# Patient Record
Sex: FEMALE | Race: BLACK OR AFRICAN AMERICAN | NOT HISPANIC OR LATINO | URBAN - METROPOLITAN AREA
[De-identification: names, ages, dates, MRNs, and addresses within clinical notes are randomized per-mention and may not be internally consistent; named-entity substitution may affect disease eponyms.]

---

## 2018-01-01 ENCOUNTER — OUTPATIENT (OUTPATIENT)
Dept: OUTPATIENT SERVICES | Facility: HOSPITAL | Age: 68
LOS: 1 days | Discharge: HOME | End: 2018-01-01

## 2018-01-01 ENCOUNTER — INPATIENT (INPATIENT)
Facility: HOSPITAL | Age: 68
LOS: 16 days | End: 2018-05-02
Attending: HOSPITALIST | Admitting: HOSPITALIST

## 2018-01-01 VITALS
DIASTOLIC BLOOD PRESSURE: 57 MMHG | SYSTOLIC BLOOD PRESSURE: 120 MMHG | TEMPERATURE: 99 F | RESPIRATION RATE: 20 BRPM | HEART RATE: 93 BPM | OXYGEN SATURATION: 89 %

## 2018-01-01 VITALS — HEART RATE: 105 BPM | OXYGEN SATURATION: 98 %

## 2018-01-01 DIAGNOSIS — N18.9 CHRONIC KIDNEY DISEASE, UNSPECIFIED: ICD-10-CM

## 2018-01-01 DIAGNOSIS — R79.9 ABNORMAL FINDING OF BLOOD CHEMISTRY, UNSPECIFIED: ICD-10-CM

## 2018-01-01 DIAGNOSIS — B96.89 OTHER SPECIFIED BACTERIAL AGENTS AS THE CAUSE OF DISEASES CLASSIFIED ELSEWHERE: ICD-10-CM

## 2018-01-01 DIAGNOSIS — R94.5 ABNORMAL RESULTS OF LIVER FUNCTION STUDIES: ICD-10-CM

## 2018-01-01 DIAGNOSIS — A09 INFECTIOUS GASTROENTERITIS AND COLITIS, UNSPECIFIED: ICD-10-CM

## 2018-01-01 DIAGNOSIS — J95.811 POSTPROCEDURAL PNEUMOTHORAX: ICD-10-CM
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
## 2018-01-01 DIAGNOSIS — I10 ESSENTIAL (PRIMARY) HYPERTENSION: ICD-10-CM

## 2018-01-01 DIAGNOSIS — J93.9 PNEUMOTHORAX, UNSPECIFIED: ICD-10-CM

## 2018-01-01 DIAGNOSIS — R53.83 OTHER FATIGUE: ICD-10-CM

## 2018-01-01 LAB
-  CANDIDA TROPICALIS: SIGNIFICANT CHANGE UP
ALBUMIN FLD-MCNC: 1.1 G/DL — SIGNIFICANT CHANGE UP
ALBUMIN FLD-MCNC: 1.4 G/DL — SIGNIFICANT CHANGE UP
ALBUMIN SERPL ELPH-MCNC: 1.8 G/DL — LOW (ref 3.5–5.2)
ALBUMIN SERPL ELPH-MCNC: 1.9 G/DL — LOW (ref 3.5–5.2)
ALBUMIN SERPL ELPH-MCNC: 2.2 G/DL — LOW (ref 3.5–5.2)
ALBUMIN SERPL ELPH-MCNC: 2.4 G/DL — LOW (ref 3.5–5.2)
ALP SERPL-CCNC: 177 U/L — HIGH (ref 30–115)
ALP SERPL-CCNC: 191 U/L — HIGH (ref 30–115)
ALP SERPL-CCNC: 199 U/L — HIGH (ref 30–115)
ALP SERPL-CCNC: 212 U/L — HIGH (ref 30–115)
ALT FLD-CCNC: 10 U/L — SIGNIFICANT CHANGE UP (ref 0–41)
ALT FLD-CCNC: 11 U/L — SIGNIFICANT CHANGE UP (ref 0–41)
ALT FLD-CCNC: 13 U/L — SIGNIFICANT CHANGE UP (ref 0–41)
ALT FLD-CCNC: 9 U/L — SIGNIFICANT CHANGE UP (ref 0–41)
AMYLASE FLD-CCNC: 33 U/L — SIGNIFICANT CHANGE UP
ANION GAP SERPL CALC-SCNC: 10 MMOL/L — SIGNIFICANT CHANGE UP (ref 7–14)
ANION GAP SERPL CALC-SCNC: 11 MMOL/L — SIGNIFICANT CHANGE UP (ref 7–14)
ANION GAP SERPL CALC-SCNC: 12 MMOL/L — SIGNIFICANT CHANGE UP (ref 7–14)
ANION GAP SERPL CALC-SCNC: 14 MMOL/L — SIGNIFICANT CHANGE UP (ref 7–14)
ANION GAP SERPL CALC-SCNC: 17 MMOL/L — HIGH (ref 7–14)
ANION GAP SERPL CALC-SCNC: 17 MMOL/L — HIGH (ref 7–14)
ANION GAP SERPL CALC-SCNC: 18 MMOL/L — HIGH (ref 7–14)
ANION GAP SERPL CALC-SCNC: 18 MMOL/L — HIGH (ref 7–14)
ANION GAP SERPL CALC-SCNC: 7 MMOL/L — SIGNIFICANT CHANGE UP (ref 7–14)
ANION GAP SERPL CALC-SCNC: 8 MMOL/L — SIGNIFICANT CHANGE UP (ref 7–14)
ANION GAP SERPL CALC-SCNC: 8 MMOL/L — SIGNIFICANT CHANGE UP (ref 7–14)
ANION GAP SERPL CALC-SCNC: 9 MMOL/L — SIGNIFICANT CHANGE UP (ref 7–14)
ANISOCYTOSIS BLD QL: SIGNIFICANT CHANGE UP
APPEARANCE UR: (no result)
APPEARANCE UR: CLEAR — SIGNIFICANT CHANGE UP
APTT BLD: 33.6 SEC — SIGNIFICANT CHANGE UP (ref 27–39.2)
AST SERPL-CCNC: 44 U/L — HIGH (ref 0–41)
AST SERPL-CCNC: 48 U/L — HIGH (ref 0–41)
AST SERPL-CCNC: 50 U/L — HIGH (ref 0–41)
AST SERPL-CCNC: 86 U/L — HIGH (ref 0–41)
B PERT IGG+IGM PNL SER: (no result)
B PERT IGG+IGM PNL SER: CLEAR — SIGNIFICANT CHANGE UP
BACTERIA # UR AUTO: (no result) /HPF
BACTERIA # UR AUTO: (no result) /HPF
BASE EXCESS BLDA CALC-SCNC: -0.1 MMOL/L — SIGNIFICANT CHANGE UP (ref -2–2)
BASE EXCESS BLDA CALC-SCNC: -0.2 MMOL/L — SIGNIFICANT CHANGE UP (ref -2–2)
BASE EXCESS BLDA CALC-SCNC: -1.2 MMOL/L — SIGNIFICANT CHANGE UP (ref -2–2)
BASE EXCESS BLDA CALC-SCNC: -1.4 MMOL/L — SIGNIFICANT CHANGE UP (ref -2–2)
BASE EXCESS BLDA CALC-SCNC: -3.7 MMOL/L — LOW (ref -2–2)
BASE EXCESS BLDA CALC-SCNC: 0.3 MMOL/L — SIGNIFICANT CHANGE UP (ref -2–2)
BASE EXCESS BLDA CALC-SCNC: 0.5 MMOL/L — SIGNIFICANT CHANGE UP (ref -2–2)
BASE EXCESS BLDA CALC-SCNC: 0.7 MMOL/L — SIGNIFICANT CHANGE UP (ref -2–2)
BASE EXCESS BLDA CALC-SCNC: 0.9 MMOL/L — SIGNIFICANT CHANGE UP (ref -2–2)
BASE EXCESS BLDA CALC-SCNC: 1 MMOL/L — SIGNIFICANT CHANGE UP (ref -2–2)
BASE EXCESS BLDA CALC-SCNC: 1.1 MMOL/L — SIGNIFICANT CHANGE UP (ref -2–2)
BASE EXCESS BLDA CALC-SCNC: 3.3 MMOL/L — HIGH (ref -2–2)
BASE EXCESS BLDA CALC-SCNC: SIGNIFICANT CHANGE UP MMOL/L (ref -2–2)
BASE EXCESS BLDV CALC-SCNC: -1.2 MMOL/L — SIGNIFICANT CHANGE UP (ref -2–2)
BASE EXCESS BLDV CALC-SCNC: 0.3 MMOL/L — SIGNIFICANT CHANGE UP (ref -2–2)
BASOPHILS # BLD AUTO: 0 K/UL — SIGNIFICANT CHANGE UP (ref 0–0.2)
BASOPHILS # BLD AUTO: 0.01 K/UL — SIGNIFICANT CHANGE UP (ref 0–0.2)
BASOPHILS # BLD AUTO: 0.02 K/UL — SIGNIFICANT CHANGE UP (ref 0–0.2)
BASOPHILS # BLD AUTO: 0.03 K/UL — SIGNIFICANT CHANGE UP (ref 0–0.2)
BASOPHILS # BLD AUTO: 0.03 K/UL — SIGNIFICANT CHANGE UP (ref 0–0.2)
BASOPHILS # BLD AUTO: 0.04 K/UL — SIGNIFICANT CHANGE UP (ref 0–0.2)
BASOPHILS # BLD AUTO: 0.04 K/UL — SIGNIFICANT CHANGE UP (ref 0–0.2)
BASOPHILS # BLD AUTO: 0.05 K/UL — SIGNIFICANT CHANGE UP (ref 0–0.2)
BASOPHILS # BLD AUTO: 0.05 K/UL — SIGNIFICANT CHANGE UP (ref 0–0.2)
BASOPHILS # BLD AUTO: 0.06 K/UL — SIGNIFICANT CHANGE UP (ref 0–0.2)
BASOPHILS # BLD AUTO: 0.06 K/UL — SIGNIFICANT CHANGE UP (ref 0–0.2)
BASOPHILS NFR BLD AUTO: 0 % — SIGNIFICANT CHANGE UP (ref 0–1)
BASOPHILS NFR BLD AUTO: 0.1 % — SIGNIFICANT CHANGE UP (ref 0–1)
BASOPHILS NFR BLD AUTO: 0.2 % — SIGNIFICANT CHANGE UP (ref 0–1)
BILIRUB DIRECT SERPL-MCNC: <0.2 MG/DL — SIGNIFICANT CHANGE UP (ref 0–0.2)
BILIRUB DIRECT SERPL-MCNC: <0.2 MG/DL — SIGNIFICANT CHANGE UP (ref 0–0.2)
BILIRUB INDIRECT FLD-MCNC: >0 MG/DL — LOW (ref 0.2–1.2)
BILIRUB INDIRECT FLD-MCNC: >0 MG/DL — LOW (ref 0.2–1.2)
BILIRUB SERPL-MCNC: 0.2 MG/DL — SIGNIFICANT CHANGE UP (ref 0.2–1.2)
BILIRUB SERPL-MCNC: 0.2 MG/DL — SIGNIFICANT CHANGE UP (ref 0.2–1.2)
BILIRUB SERPL-MCNC: 0.3 MG/DL — SIGNIFICANT CHANGE UP (ref 0.2–1.2)
BILIRUB SERPL-MCNC: 0.4 MG/DL — SIGNIFICANT CHANGE UP (ref 0.2–1.2)
BILIRUB UR-MCNC: NEGATIVE — SIGNIFICANT CHANGE UP
BILIRUB UR-MCNC: NEGATIVE — SIGNIFICANT CHANGE UP
BLD GP AB SCN SERPL QL: SIGNIFICANT CHANGE UP
BLD GP AB SCN SERPL QL: SIGNIFICANT CHANGE UP
BUN SERPL-MCNC: 39 MG/DL — HIGH (ref 10–20)
BUN SERPL-MCNC: 40 MG/DL — HIGH (ref 10–20)
BUN SERPL-MCNC: 40 MG/DL — HIGH (ref 10–20)
BUN SERPL-MCNC: 41 MG/DL — HIGH (ref 10–20)
BUN SERPL-MCNC: 42 MG/DL — HIGH (ref 10–20)
BUN SERPL-MCNC: 42 MG/DL — HIGH (ref 10–20)
BUN SERPL-MCNC: 43 MG/DL — HIGH (ref 10–20)
BUN SERPL-MCNC: 44 MG/DL — HIGH (ref 10–20)
BUN SERPL-MCNC: 45 MG/DL — HIGH (ref 10–20)
BUN SERPL-MCNC: 47 MG/DL — HIGH (ref 10–20)
BUN SERPL-MCNC: 48 MG/DL — HIGH (ref 10–20)
BUN SERPL-MCNC: 49 MG/DL — HIGH (ref 10–20)
BUN SERPL-MCNC: 50 MG/DL — HIGH (ref 10–20)
BUN SERPL-MCNC: 51 MG/DL — HIGH (ref 10–20)
BUN SERPL-MCNC: 53 MG/DL — HIGH (ref 10–20)
BUN SERPL-MCNC: 61 MG/DL — CRITICAL HIGH (ref 10–20)
BUN SERPL-MCNC: 63 MG/DL — CRITICAL HIGH (ref 10–20)
BUN SERPL-MCNC: 64 MG/DL — CRITICAL HIGH (ref 10–20)
CA-I SERPL-SCNC: 1.14 MMOL/L — SIGNIFICANT CHANGE UP (ref 1.12–1.3)
CA-I SERPL-SCNC: 1.18 MMOL/L — SIGNIFICANT CHANGE UP (ref 1.12–1.3)
CALCIUM SERPL-MCNC: 7.6 MG/DL — LOW (ref 8.5–10.1)
CALCIUM SERPL-MCNC: 7.7 MG/DL — LOW (ref 8.5–10.1)
CALCIUM SERPL-MCNC: 7.9 MG/DL — LOW (ref 8.5–10.1)
CALCIUM SERPL-MCNC: 8 MG/DL — LOW (ref 8.5–10.1)
CALCIUM SERPL-MCNC: 8.1 MG/DL — LOW (ref 8.5–10.1)
CALCIUM SERPL-MCNC: 8.2 MG/DL — LOW (ref 8.5–10.1)
CALCIUM SERPL-MCNC: 8.3 MG/DL — LOW (ref 8.5–10.1)
CALCIUM SERPL-MCNC: 8.3 MG/DL — LOW (ref 8.5–10.1)
CALCIUM SERPL-MCNC: 8.5 MG/DL — SIGNIFICANT CHANGE UP (ref 8.5–10.1)
CALCIUM SERPL-MCNC: 8.5 MG/DL — SIGNIFICANT CHANGE UP (ref 8.5–10.1)
CALCIUM SERPL-MCNC: 8.6 MG/DL — SIGNIFICANT CHANGE UP (ref 8.5–10.1)
CALCIUM SERPL-MCNC: 8.6 MG/DL — SIGNIFICANT CHANGE UP (ref 8.5–10.1)
CHLORIDE SERPL-SCNC: 100 MMOL/L — SIGNIFICANT CHANGE UP (ref 98–110)
CHLORIDE SERPL-SCNC: 102 MMOL/L — SIGNIFICANT CHANGE UP (ref 98–110)
CHLORIDE SERPL-SCNC: 103 MMOL/L — SIGNIFICANT CHANGE UP (ref 98–110)
CHLORIDE SERPL-SCNC: 104 MMOL/L — SIGNIFICANT CHANGE UP (ref 98–110)
CHLORIDE SERPL-SCNC: 105 MMOL/L — SIGNIFICANT CHANGE UP (ref 98–110)
CHLORIDE SERPL-SCNC: 105 MMOL/L — SIGNIFICANT CHANGE UP (ref 98–110)
CHLORIDE SERPL-SCNC: 107 MMOL/L — SIGNIFICANT CHANGE UP (ref 98–110)
CHLORIDE SERPL-SCNC: 108 MMOL/L — SIGNIFICANT CHANGE UP (ref 98–110)
CHLORIDE SERPL-SCNC: 98 MMOL/L — SIGNIFICANT CHANGE UP (ref 98–110)
CHLORIDE SERPL-SCNC: 99 MMOL/L — SIGNIFICANT CHANGE UP (ref 98–110)
CHLORIDE SERPL-SCNC: 99 MMOL/L — SIGNIFICANT CHANGE UP (ref 98–110)
CHLORIDE UR-SCNC: 56 — SIGNIFICANT CHANGE UP
CHLORIDE UR-SCNC: 76 — SIGNIFICANT CHANGE UP
CK MB CFR SERPL CALC: 1.5 NG/ML — SIGNIFICANT CHANGE UP (ref 0.6–6.3)
CK MB CFR SERPL CALC: 1.7 NG/ML — SIGNIFICANT CHANGE UP (ref 0.6–6.3)
CK SERPL-CCNC: 50 U/L — SIGNIFICANT CHANGE UP (ref 0–225)
CK SERPL-CCNC: 56 U/L — SIGNIFICANT CHANGE UP (ref 0–225)
CO2 SERPL-SCNC: 19 MMOL/L — SIGNIFICANT CHANGE UP (ref 17–32)
CO2 SERPL-SCNC: 19 MMOL/L — SIGNIFICANT CHANGE UP (ref 17–32)
CO2 SERPL-SCNC: 20 MMOL/L — SIGNIFICANT CHANGE UP (ref 17–32)
CO2 SERPL-SCNC: 23 MMOL/L — SIGNIFICANT CHANGE UP (ref 17–32)
CO2 SERPL-SCNC: 23 MMOL/L — SIGNIFICANT CHANGE UP (ref 17–32)
CO2 SERPL-SCNC: 24 MMOL/L — SIGNIFICANT CHANGE UP (ref 17–32)
CO2 SERPL-SCNC: 24 MMOL/L — SIGNIFICANT CHANGE UP (ref 17–32)
CO2 SERPL-SCNC: 25 MMOL/L — SIGNIFICANT CHANGE UP (ref 17–32)
CO2 SERPL-SCNC: 25 MMOL/L — SIGNIFICANT CHANGE UP (ref 17–32)
CO2 SERPL-SCNC: 26 MMOL/L — SIGNIFICANT CHANGE UP (ref 17–32)
CO2 SERPL-SCNC: 27 MMOL/L — SIGNIFICANT CHANGE UP (ref 17–32)
COLOR FLD: YELLOW — SIGNIFICANT CHANGE UP
COLOR FLD: YELLOW — SIGNIFICANT CHANGE UP
COLOR SPEC: YELLOW — SIGNIFICANT CHANGE UP
COLOR SPEC: YELLOW — SIGNIFICANT CHANGE UP
CREAT ?TM UR-MCNC: 75 MG/DL — SIGNIFICANT CHANGE UP
CREAT ?TM UR-MCNC: 75 MG/DL — SIGNIFICANT CHANGE UP
CREAT SERPL-MCNC: 1.1 MG/DL — SIGNIFICANT CHANGE UP (ref 0.7–1.5)
CREAT SERPL-MCNC: 1.2 MG/DL — SIGNIFICANT CHANGE UP (ref 0.7–1.5)
CREAT SERPL-MCNC: 1.2 MG/DL — SIGNIFICANT CHANGE UP (ref 0.7–1.5)
CREAT SERPL-MCNC: 1.4 MG/DL — SIGNIFICANT CHANGE UP (ref 0.7–1.5)
CREAT SERPL-MCNC: 1.6 MG/DL — HIGH (ref 0.7–1.5)
CREAT SERPL-MCNC: 1.7 MG/DL — HIGH (ref 0.7–1.5)
CREAT SERPL-MCNC: 1.9 MG/DL — HIGH (ref 0.7–1.5)
CREAT SERPL-MCNC: 2 MG/DL — HIGH (ref 0.7–1.5)
CREAT SERPL-MCNC: 2.1 MG/DL — HIGH (ref 0.7–1.5)
CREAT SERPL-MCNC: 2.5 MG/DL — HIGH (ref 0.7–1.5)
CULTURE RESULTS: NO GROWTH — SIGNIFICANT CHANGE UP
CULTURE RESULTS: NO GROWTH — SIGNIFICANT CHANGE UP
CULTURE RESULTS: SIGNIFICANT CHANGE UP
CULTURE RESULTS: SIGNIFICANT CHANGE UP
DIFF PNL FLD: (no result)
DIFF PNL FLD: NEGATIVE — SIGNIFICANT CHANGE UP
EOSINOPHIL # BLD AUTO: 0 K/UL — SIGNIFICANT CHANGE UP (ref 0–0.7)
EOSINOPHIL # BLD AUTO: 0 K/UL — SIGNIFICANT CHANGE UP (ref 0–0.7)
EOSINOPHIL # BLD AUTO: 0.01 K/UL — SIGNIFICANT CHANGE UP (ref 0–0.7)
EOSINOPHIL # BLD AUTO: 0.02 K/UL — SIGNIFICANT CHANGE UP (ref 0–0.7)
EOSINOPHIL # BLD AUTO: 0.05 K/UL — SIGNIFICANT CHANGE UP (ref 0–0.7)
EOSINOPHIL # BLD AUTO: 0.06 K/UL — SIGNIFICANT CHANGE UP (ref 0–0.7)
EOSINOPHIL # BLD AUTO: 0.06 K/UL — SIGNIFICANT CHANGE UP (ref 0–0.7)
EOSINOPHIL # BLD AUTO: 0.07 K/UL — SIGNIFICANT CHANGE UP (ref 0–0.7)
EOSINOPHIL # BLD AUTO: 0.07 K/UL — SIGNIFICANT CHANGE UP (ref 0–0.7)
EOSINOPHIL # BLD AUTO: 0.11 K/UL — SIGNIFICANT CHANGE UP (ref 0–0.7)
EOSINOPHIL # BLD AUTO: 0.13 K/UL — SIGNIFICANT CHANGE UP (ref 0–0.7)
EOSINOPHIL # BLD AUTO: 0.13 K/UL — SIGNIFICANT CHANGE UP (ref 0–0.7)
EOSINOPHIL # BLD AUTO: 0.15 K/UL — SIGNIFICANT CHANGE UP (ref 0–0.7)
EOSINOPHIL # BLD AUTO: 0.17 K/UL — SIGNIFICANT CHANGE UP (ref 0–0.7)
EOSINOPHIL # BLD AUTO: 0.2 K/UL — SIGNIFICANT CHANGE UP (ref 0–0.7)
EOSINOPHIL # BLD AUTO: 0.21 K/UL — SIGNIFICANT CHANGE UP (ref 0–0.7)
EOSINOPHIL NFR BLD AUTO: 0 % — SIGNIFICANT CHANGE UP (ref 0–8)
EOSINOPHIL NFR BLD AUTO: 0 % — SIGNIFICANT CHANGE UP (ref 0–8)
EOSINOPHIL NFR BLD AUTO: 0.1 % — SIGNIFICANT CHANGE UP (ref 0–8)
EOSINOPHIL NFR BLD AUTO: 0.1 % — SIGNIFICANT CHANGE UP (ref 0–8)
EOSINOPHIL NFR BLD AUTO: 0.2 % — SIGNIFICANT CHANGE UP (ref 0–8)
EOSINOPHIL NFR BLD AUTO: 0.4 % — SIGNIFICANT CHANGE UP (ref 0–8)
EOSINOPHIL NFR BLD AUTO: 0.5 % — SIGNIFICANT CHANGE UP (ref 0–8)
EOSINOPHIL NFR BLD AUTO: 0.7 % — SIGNIFICANT CHANGE UP (ref 0–8)
EOSINOPHIL NFR BLD AUTO: 0.8 % — SIGNIFICANT CHANGE UP (ref 0–8)
EOSINOPHIL NFR BLD AUTO: 1.2 % — SIGNIFICANT CHANGE UP (ref 0–8)
EOSINOPHIL NFR BLD AUTO: 1.7 % — SIGNIFICANT CHANGE UP (ref 0–8)
EPI CELLS # UR: (no result) /HPF
FLUID INTAKE SUBSTANCE CLASS: SIGNIFICANT CHANGE UP
FLUID INTAKE SUBSTANCE CLASS: SIGNIFICANT CHANGE UP
FLUID SEGMENTED GRANULOCYTES: 2 % — SIGNIFICANT CHANGE UP
FLUID SEGMENTED GRANULOCYTES: 70 % — SIGNIFICANT CHANGE UP
GAS PNL BLDA: SIGNIFICANT CHANGE UP
GAS PNL BLDA: SIGNIFICANT CHANGE UP
GAS PNL BLDV: 137 MMOL/L — SIGNIFICANT CHANGE UP (ref 136–145)
GAS PNL BLDV: 137 MMOL/L — SIGNIFICANT CHANGE UP (ref 136–145)
GAS PNL BLDV: SIGNIFICANT CHANGE UP
GAS PNL BLDV: SIGNIFICANT CHANGE UP
GIANT PLATELETS BLD QL SMEAR: PRESENT — SIGNIFICANT CHANGE UP
GLUCOSE FLD-MCNC: 118 MG/DL — SIGNIFICANT CHANGE UP
GLUCOSE SERPL-MCNC: 109 MG/DL — HIGH (ref 70–99)
GLUCOSE SERPL-MCNC: 111 MG/DL — HIGH (ref 70–99)
GLUCOSE SERPL-MCNC: 111 MG/DL — HIGH (ref 70–99)
GLUCOSE SERPL-MCNC: 117 MG/DL — HIGH (ref 70–99)
GLUCOSE SERPL-MCNC: 118 MG/DL — HIGH (ref 70–99)
GLUCOSE SERPL-MCNC: 121 MG/DL — HIGH (ref 70–99)
GLUCOSE SERPL-MCNC: 122 MG/DL — HIGH (ref 70–99)
GLUCOSE SERPL-MCNC: 124 MG/DL — HIGH (ref 70–99)
GLUCOSE SERPL-MCNC: 132 MG/DL — HIGH (ref 70–99)
GLUCOSE SERPL-MCNC: 135 MG/DL — HIGH (ref 70–99)
GLUCOSE SERPL-MCNC: 28 MG/DL — CRITICAL LOW (ref 70–99)
GLUCOSE SERPL-MCNC: 61 MG/DL — LOW (ref 70–99)
GLUCOSE SERPL-MCNC: 63 MG/DL — LOW (ref 70–99)
GLUCOSE SERPL-MCNC: 63 MG/DL — LOW (ref 70–99)
GLUCOSE SERPL-MCNC: 67 MG/DL — LOW (ref 70–99)
GLUCOSE SERPL-MCNC: 69 MG/DL — LOW (ref 70–99)
GLUCOSE SERPL-MCNC: 82 MG/DL — SIGNIFICANT CHANGE UP (ref 70–99)
GLUCOSE SERPL-MCNC: 98 MG/DL — SIGNIFICANT CHANGE UP (ref 70–99)
GLUCOSE UR QL: NEGATIVE MG/DL — SIGNIFICANT CHANGE UP
GLUCOSE UR QL: NEGATIVE — SIGNIFICANT CHANGE UP
GRAM STN FLD: SIGNIFICANT CHANGE UP
HCO3 BLDA-SCNC: 21 MMOL/L — LOW (ref 23–27)
HCO3 BLDA-SCNC: 23 MMOL/L — SIGNIFICANT CHANGE UP (ref 23–27)
HCO3 BLDA-SCNC: 25 MMOL/L — SIGNIFICANT CHANGE UP (ref 23–27)
HCO3 BLDA-SCNC: 26 MMOL/L — SIGNIFICANT CHANGE UP (ref 23–27)
HCO3 BLDA-SCNC: 27 MMOL/L — SIGNIFICANT CHANGE UP (ref 23–27)
HCO3 BLDV-SCNC: 26 MMOL/L — SIGNIFICANT CHANGE UP (ref 22–29)
HCO3 BLDV-SCNC: 27 MMOL/L — SIGNIFICANT CHANGE UP (ref 22–29)
HCT VFR BLD CALC: 20.2 % — LOW (ref 37–47)
HCT VFR BLD CALC: 21.3 % — LOW (ref 37–47)
HCT VFR BLD CALC: 21.6 % — LOW (ref 37–47)
HCT VFR BLD CALC: 21.9 % — LOW (ref 37–47)
HCT VFR BLD CALC: 22.9 % — LOW (ref 37–47)
HCT VFR BLD CALC: 23.3 % — LOW (ref 37–47)
HCT VFR BLD CALC: 24.2 % — LOW (ref 37–47)
HCT VFR BLD CALC: 24.3 % — LOW (ref 37–47)
HCT VFR BLD CALC: 24.4 % — LOW (ref 37–47)
HCT VFR BLD CALC: 24.5 % — LOW (ref 37–47)
HCT VFR BLD CALC: 24.5 % — LOW (ref 37–47)
HCT VFR BLD CALC: 24.6 % — LOW (ref 37–47)
HCT VFR BLD CALC: 24.6 % — LOW (ref 37–47)
HCT VFR BLD CALC: 24.8 % — LOW (ref 37–47)
HCT VFR BLD CALC: 25.1 % — LOW (ref 37–47)
HCT VFR BLD CALC: 25.2 % — LOW (ref 37–47)
HCT VFR BLD CALC: 26.7 % — LOW (ref 37–47)
HCT VFR BLD CALC: 27.9 % — LOW (ref 37–47)
HCT VFR BLDA CALC: 25.7 % — LOW (ref 34–44)
HCT VFR BLDA CALC: 49 % — HIGH (ref 34–44)
HGB BLD CALC-MCNC: 16 G/DL — SIGNIFICANT CHANGE UP (ref 14–18)
HGB BLD CALC-MCNC: 8.4 G/DL — LOW (ref 14–18)
HGB BLD-MCNC: 6.4 G/DL — CRITICAL LOW (ref 12–16)
HGB BLD-MCNC: 6.8 G/DL — CRITICAL LOW (ref 12–16)
HGB BLD-MCNC: 7.1 G/DL — CRITICAL LOW (ref 12–16)
HGB BLD-MCNC: 7.1 G/DL — CRITICAL LOW (ref 12–16)
HGB BLD-MCNC: 7.6 G/DL — LOW (ref 12–16)
HGB BLD-MCNC: 7.6 G/DL — LOW (ref 12–16)
HGB BLD-MCNC: 7.8 G/DL — LOW (ref 12–16)
HGB BLD-MCNC: 7.9 G/DL — LOW (ref 12–16)
HGB BLD-MCNC: 7.9 G/DL — LOW (ref 12–16)
HGB BLD-MCNC: 8 G/DL — LOW (ref 12–16)
HGB BLD-MCNC: 8 G/DL — LOW (ref 12–16)
HGB BLD-MCNC: 8.1 G/DL — LOW (ref 12–16)
HGB BLD-MCNC: 8.7 G/DL — LOW (ref 12–16)
HGB BLD-MCNC: 8.9 G/DL — LOW (ref 12–16)
HOROWITZ INDEX BLDA+IHG-RTO: 100 — SIGNIFICANT CHANGE UP
HOROWITZ INDEX BLDA+IHG-RTO: 100 — SIGNIFICANT CHANGE UP
HOROWITZ INDEX BLDA+IHG-RTO: 60 — SIGNIFICANT CHANGE UP
HYPOCHROMIA BLD QL: SLIGHT — SIGNIFICANT CHANGE UP
IMM GRANULOCYTES NFR BLD AUTO: 0.2 % — SIGNIFICANT CHANGE UP (ref 0.1–0.3)
IMM GRANULOCYTES NFR BLD AUTO: 0.5 % — HIGH (ref 0.1–0.3)
IMM GRANULOCYTES NFR BLD AUTO: 0.6 % — HIGH (ref 0.1–0.3)
IMM GRANULOCYTES NFR BLD AUTO: 0.8 % — HIGH (ref 0.1–0.3)
IMM GRANULOCYTES NFR BLD AUTO: 1 % — HIGH (ref 0.1–0.3)
IMM GRANULOCYTES NFR BLD AUTO: 1 % — HIGH (ref 0.1–0.3)
IMM GRANULOCYTES NFR BLD AUTO: 1.2 % — HIGH (ref 0.1–0.3)
IMM GRANULOCYTES NFR BLD AUTO: 1.3 % — HIGH (ref 0.1–0.3)
IMM GRANULOCYTES NFR BLD AUTO: 1.4 % — HIGH (ref 0.1–0.3)
IMM GRANULOCYTES NFR BLD AUTO: 2 % — HIGH (ref 0.1–0.3)
IMM GRANULOCYTES NFR BLD AUTO: 2 % — HIGH (ref 0.1–0.3)
IMM GRANULOCYTES NFR BLD AUTO: 2.4 % — HIGH (ref 0.1–0.3)
IMM GRANULOCYTES NFR BLD AUTO: 2.7 % — HIGH (ref 0.1–0.3)
IMM GRANULOCYTES NFR BLD AUTO: 4.2 % — HIGH (ref 0.1–0.3)
IMM GRANULOCYTES NFR BLD AUTO: 5.7 % — HIGH (ref 0.1–0.3)
INR BLD: 1.44 RATIO — HIGH (ref 0.65–1.3)
KETONES UR-MCNC: NEGATIVE — SIGNIFICANT CHANGE UP
KETONES UR-MCNC: NEGATIVE — SIGNIFICANT CHANGE UP
LACTATE BLDA-MCNC: 1.9 MMOL/L — HIGH (ref 0.5–1.6)
LACTATE BLDA-MCNC: 2 MMOL/L — HIGH (ref 0.5–1.6)
LACTATE BLDA-MCNC: 2.2 MMOL/L — HIGH (ref 0.5–1.6)
LACTATE BLDV-MCNC: 2.4 MMOL/L — HIGH (ref 0.5–1.6)
LACTATE BLDV-MCNC: 2.4 MMOL/L — HIGH (ref 0.5–1.6)
LDH SERPL L TO P-CCNC: 197 U/L — SIGNIFICANT CHANGE UP
LDH SERPL L TO P-CCNC: 251 U/L — SIGNIFICANT CHANGE UP
LDH SERPL L TO P-CCNC: <10 — LOW (ref 50–242)
LEUKOCYTE ESTERASE UR-ACNC: (no result)
LEUKOCYTE ESTERASE UR-ACNC: NEGATIVE — SIGNIFICANT CHANGE UP
LYMPHOCYTES # BLD AUTO: 0.51 K/UL — LOW (ref 1.2–3.4)
LYMPHOCYTES # BLD AUTO: 0.59 K/UL — LOW (ref 1.2–3.4)
LYMPHOCYTES # BLD AUTO: 0.59 K/UL — LOW (ref 1.2–3.4)
LYMPHOCYTES # BLD AUTO: 0.6 K/UL — LOW (ref 1.2–3.4)
LYMPHOCYTES # BLD AUTO: 0.61 K/UL — LOW (ref 1.2–3.4)
LYMPHOCYTES # BLD AUTO: 0.62 K/UL — LOW (ref 1.2–3.4)
LYMPHOCYTES # BLD AUTO: 0.62 K/UL — LOW (ref 1.2–3.4)
LYMPHOCYTES # BLD AUTO: 0.63 K/UL — LOW (ref 1.2–3.4)
LYMPHOCYTES # BLD AUTO: 0.7 K/UL — LOW (ref 1.2–3.4)
LYMPHOCYTES # BLD AUTO: 0.7 K/UL — LOW (ref 1.2–3.4)
LYMPHOCYTES # BLD AUTO: 0.71 K/UL — LOW (ref 1.2–3.4)
LYMPHOCYTES # BLD AUTO: 0.71 K/UL — LOW (ref 1.2–3.4)
LYMPHOCYTES # BLD AUTO: 0.81 K/UL — LOW (ref 1.2–3.4)
LYMPHOCYTES # BLD AUTO: 0.97 K/UL — LOW (ref 1.2–3.4)
LYMPHOCYTES # BLD AUTO: 1.07 K/UL — LOW (ref 1.2–3.4)
LYMPHOCYTES # BLD AUTO: 1.14 K/UL — LOW (ref 1.2–3.4)
LYMPHOCYTES # BLD AUTO: 1.7 % — LOW (ref 20.5–51.1)
LYMPHOCYTES # BLD AUTO: 2.1 % — LOW (ref 20.5–51.1)
LYMPHOCYTES # BLD AUTO: 2.3 % — LOW (ref 20.5–51.1)
LYMPHOCYTES # BLD AUTO: 2.5 % — LOW (ref 20.5–51.1)
LYMPHOCYTES # BLD AUTO: 2.6 % — LOW (ref 20.5–51.1)
LYMPHOCYTES # BLD AUTO: 3.4 % — LOW (ref 20.5–51.1)
LYMPHOCYTES # BLD AUTO: 3.6 % — LOW (ref 20.5–51.1)
LYMPHOCYTES # BLD AUTO: 4 % — LOW (ref 20.5–51.1)
LYMPHOCYTES # BLD AUTO: 4 % — LOW (ref 20.5–51.1)
LYMPHOCYTES # BLD AUTO: 4.6 % — LOW (ref 20.5–51.1)
LYMPHOCYTES # BLD AUTO: 5.8 % — LOW (ref 20.5–51.1)
LYMPHOCYTES # BLD AUTO: 6.8 % — LOW (ref 20.5–51.1)
LYMPHOCYTES # BLD AUTO: 7 % — LOW (ref 20.5–51.1)
LYMPHOCYTES # BLD AUTO: 7.1 % — LOW (ref 20.5–51.1)
LYMPHOCYTES # FLD: 10 — SIGNIFICANT CHANGE UP
LYMPHOCYTES # FLD: 38 — SIGNIFICANT CHANGE UP
MACROCYTES BLD QL: SIGNIFICANT CHANGE UP
MAGNESIUM SERPL-MCNC: 1.6 MG/DL — LOW (ref 1.8–2.4)
MAGNESIUM SERPL-MCNC: 1.7 MG/DL — LOW (ref 1.8–2.4)
MAGNESIUM SERPL-MCNC: 2.7 MG/DL — HIGH (ref 1.8–2.4)
MAGNESIUM SERPL-MCNC: 3.1 MG/DL — CRITICAL HIGH (ref 1.8–2.4)
MANUAL SMEAR VERIFICATION: SIGNIFICANT CHANGE UP
MCHC RBC-ENTMCNC: 28.7 PG — SIGNIFICANT CHANGE UP (ref 27–31)
MCHC RBC-ENTMCNC: 28.7 PG — SIGNIFICANT CHANGE UP (ref 27–31)
MCHC RBC-ENTMCNC: 29.3 PG — SIGNIFICANT CHANGE UP (ref 27–31)
MCHC RBC-ENTMCNC: 29.5 PG — SIGNIFICANT CHANGE UP (ref 27–31)
MCHC RBC-ENTMCNC: 29.6 PG — SIGNIFICANT CHANGE UP (ref 27–31)
MCHC RBC-ENTMCNC: 29.7 PG — SIGNIFICANT CHANGE UP (ref 27–31)
MCHC RBC-ENTMCNC: 29.8 PG — SIGNIFICANT CHANGE UP (ref 27–31)
MCHC RBC-ENTMCNC: 29.9 PG — SIGNIFICANT CHANGE UP (ref 27–31)
MCHC RBC-ENTMCNC: 30 PG — SIGNIFICANT CHANGE UP (ref 27–31)
MCHC RBC-ENTMCNC: 30.3 PG — SIGNIFICANT CHANGE UP (ref 27–31)
MCHC RBC-ENTMCNC: 31.1 G/DL — LOW (ref 32–37)
MCHC RBC-ENTMCNC: 31.7 G/DL — LOW (ref 32–37)
MCHC RBC-ENTMCNC: 31.7 G/DL — LOW (ref 32–37)
MCHC RBC-ENTMCNC: 31.9 G/DL — LOW (ref 32–37)
MCHC RBC-ENTMCNC: 32.1 G/DL — SIGNIFICANT CHANGE UP (ref 32–37)
MCHC RBC-ENTMCNC: 32.2 G/DL — SIGNIFICANT CHANGE UP (ref 32–37)
MCHC RBC-ENTMCNC: 32.3 G/DL — SIGNIFICANT CHANGE UP (ref 32–37)
MCHC RBC-ENTMCNC: 32.6 G/DL — SIGNIFICANT CHANGE UP (ref 32–37)
MCHC RBC-ENTMCNC: 32.7 G/DL — SIGNIFICANT CHANGE UP (ref 32–37)
MCHC RBC-ENTMCNC: 32.8 G/DL — SIGNIFICANT CHANGE UP (ref 32–37)
MCHC RBC-ENTMCNC: 32.9 G/DL — SIGNIFICANT CHANGE UP (ref 32–37)
MCHC RBC-ENTMCNC: 33.1 G/DL — SIGNIFICANT CHANGE UP (ref 32–37)
MCHC RBC-ENTMCNC: 33.2 G/DL — SIGNIFICANT CHANGE UP (ref 32–37)
MCHC RBC-ENTMCNC: 33.3 G/DL — SIGNIFICANT CHANGE UP (ref 32–37)
MCV RBC AUTO: 89 FL — SIGNIFICANT CHANGE UP (ref 81–99)
MCV RBC AUTO: 89.3 FL — SIGNIFICANT CHANGE UP (ref 81–99)
MCV RBC AUTO: 89.4 FL — SIGNIFICANT CHANGE UP (ref 81–99)
MCV RBC AUTO: 89.5 FL — SIGNIFICANT CHANGE UP (ref 81–99)
MCV RBC AUTO: 89.9 FL — SIGNIFICANT CHANGE UP (ref 81–99)
MCV RBC AUTO: 90 FL — SIGNIFICANT CHANGE UP (ref 81–99)
MCV RBC AUTO: 90.3 FL — SIGNIFICANT CHANGE UP (ref 81–99)
MCV RBC AUTO: 90.4 FL — SIGNIFICANT CHANGE UP (ref 81–99)
MCV RBC AUTO: 91.1 FL — SIGNIFICANT CHANGE UP (ref 81–99)
MCV RBC AUTO: 91.1 FL — SIGNIFICANT CHANGE UP (ref 81–99)
MCV RBC AUTO: 91.4 FL — SIGNIFICANT CHANGE UP (ref 81–99)
MCV RBC AUTO: 91.6 FL — SIGNIFICANT CHANGE UP (ref 81–99)
MCV RBC AUTO: 91.8 FL — SIGNIFICANT CHANGE UP (ref 81–99)
MCV RBC AUTO: 92.4 FL — SIGNIFICANT CHANGE UP (ref 81–99)
MCV RBC AUTO: 92.7 FL — SIGNIFICANT CHANGE UP (ref 81–99)
MCV RBC AUTO: 92.9 FL — SIGNIFICANT CHANGE UP (ref 81–99)
MCV RBC AUTO: 93.5 FL — SIGNIFICANT CHANGE UP (ref 81–99)
MCV RBC AUTO: 94 FL — SIGNIFICANT CHANGE UP (ref 81–99)
METHOD TYPE: SIGNIFICANT CHANGE UP
MICROCYTES BLD QL: SLIGHT — SIGNIFICANT CHANGE UP
MONOCYTES # BLD AUTO: 0.87 K/UL — HIGH (ref 0.1–0.6)
MONOCYTES # BLD AUTO: 0.89 K/UL — HIGH (ref 0.1–0.6)
MONOCYTES # BLD AUTO: 0.93 K/UL — HIGH (ref 0.1–0.6)
MONOCYTES # BLD AUTO: 0.94 K/UL — HIGH (ref 0.1–0.6)
MONOCYTES # BLD AUTO: 1.05 K/UL — HIGH (ref 0.1–0.6)
MONOCYTES # BLD AUTO: 1.06 K/UL — HIGH (ref 0.1–0.6)
MONOCYTES # BLD AUTO: 1.07 K/UL — HIGH (ref 0.1–0.6)
MONOCYTES # BLD AUTO: 1.14 K/UL — HIGH (ref 0.1–0.6)
MONOCYTES # BLD AUTO: 1.21 K/UL — HIGH (ref 0.1–0.6)
MONOCYTES # BLD AUTO: 1.3 K/UL — HIGH (ref 0.1–0.6)
MONOCYTES # BLD AUTO: 1.34 K/UL — HIGH (ref 0.1–0.6)
MONOCYTES # BLD AUTO: 1.37 K/UL — HIGH (ref 0.1–0.6)
MONOCYTES # BLD AUTO: 1.53 K/UL — HIGH (ref 0.1–0.6)
MONOCYTES # BLD AUTO: 1.71 K/UL — HIGH (ref 0.1–0.6)
MONOCYTES # BLD AUTO: 1.73 K/UL — HIGH (ref 0.1–0.6)
MONOCYTES # BLD AUTO: 1.75 K/UL — HIGH (ref 0.1–0.6)
MONOCYTES NFR BLD AUTO: 10.3 % — HIGH (ref 1.7–9.3)
MONOCYTES NFR BLD AUTO: 2.6 % — SIGNIFICANT CHANGE UP (ref 1.7–9.3)
MONOCYTES NFR BLD AUTO: 3.2 % — SIGNIFICANT CHANGE UP (ref 1.7–9.3)
MONOCYTES NFR BLD AUTO: 3.7 % — SIGNIFICANT CHANGE UP (ref 1.7–9.3)
MONOCYTES NFR BLD AUTO: 3.8 % — SIGNIFICANT CHANGE UP (ref 1.7–9.3)
MONOCYTES NFR BLD AUTO: 5.1 % — SIGNIFICANT CHANGE UP (ref 1.7–9.3)
MONOCYTES NFR BLD AUTO: 5.9 % — SIGNIFICANT CHANGE UP (ref 1.7–9.3)
MONOCYTES NFR BLD AUTO: 6.2 % — SIGNIFICANT CHANGE UP (ref 1.7–9.3)
MONOCYTES NFR BLD AUTO: 6.4 % — SIGNIFICANT CHANGE UP (ref 1.7–9.3)
MONOCYTES NFR BLD AUTO: 7.1 % — SIGNIFICANT CHANGE UP (ref 1.7–9.3)
MONOCYTES NFR BLD AUTO: 7.4 % — SIGNIFICANT CHANGE UP (ref 1.7–9.3)
MONOCYTES NFR BLD AUTO: 7.4 % — SIGNIFICANT CHANGE UP (ref 1.7–9.3)
MONOCYTES NFR BLD AUTO: 8 % — SIGNIFICANT CHANGE UP (ref 1.7–9.3)
MONOCYTES NFR BLD AUTO: 8.9 % — SIGNIFICANT CHANGE UP (ref 1.7–9.3)
MONOCYTES NFR BLD AUTO: 9.1 % — SIGNIFICANT CHANGE UP (ref 1.7–9.3)
MONOCYTES NFR BLD AUTO: 9.3 % — SIGNIFICANT CHANGE UP (ref 1.7–9.3)
MONOS+MACROS # FLD: 20 % — SIGNIFICANT CHANGE UP
MONOS+MACROS # FLD: 60 % — SIGNIFICANT CHANGE UP
MRSA PCR RESULT.: NEGATIVE — SIGNIFICANT CHANGE UP
NEUTROPHILS # BLD AUTO: 11.24 K/UL — HIGH (ref 1.4–6.5)
NEUTROPHILS # BLD AUTO: 12.56 K/UL — HIGH (ref 1.4–6.5)
NEUTROPHILS # BLD AUTO: 12.97 K/UL — HIGH (ref 1.4–6.5)
NEUTROPHILS # BLD AUTO: 15.6 K/UL — HIGH (ref 1.4–6.5)
NEUTROPHILS # BLD AUTO: 15.8 K/UL — HIGH (ref 1.4–6.5)
NEUTROPHILS # BLD AUTO: 21.72 K/UL — HIGH (ref 1.4–6.5)
NEUTROPHILS # BLD AUTO: 23.87 K/UL — HIGH (ref 1.4–6.5)
NEUTROPHILS # BLD AUTO: 25.22 K/UL — HIGH (ref 1.4–6.5)
NEUTROPHILS # BLD AUTO: 25.26 K/UL — HIGH (ref 1.4–6.5)
NEUTROPHILS # BLD AUTO: 25.61 K/UL — HIGH (ref 1.4–6.5)
NEUTROPHILS # BLD AUTO: 25.95 K/UL — HIGH (ref 1.4–6.5)
NEUTROPHILS # BLD AUTO: 31.41 K/UL — HIGH (ref 1.4–6.5)
NEUTROPHILS # BLD AUTO: 41.39 K/UL — HIGH (ref 1.4–6.5)
NEUTROPHILS # BLD AUTO: 7.14 K/UL — HIGH (ref 1.4–6.5)
NEUTROPHILS # BLD AUTO: 7.65 K/UL — HIGH (ref 1.4–6.5)
NEUTROPHILS # BLD AUTO: 9.65 K/UL — HIGH (ref 1.4–6.5)
NEUTROPHILS NFR BLD AUTO: 77.8 % — HIGH (ref 42.2–75.2)
NEUTROPHILS NFR BLD AUTO: 79.3 % — HIGH (ref 42.2–75.2)
NEUTROPHILS NFR BLD AUTO: 81.6 % — HIGH (ref 42.2–75.2)
NEUTROPHILS NFR BLD AUTO: 85.7 % — HIGH (ref 42.2–75.2)
NEUTROPHILS NFR BLD AUTO: 85.9 % — HIGH (ref 42.2–75.2)
NEUTROPHILS NFR BLD AUTO: 86.2 % — HIGH (ref 42.2–75.2)
NEUTROPHILS NFR BLD AUTO: 86.2 % — HIGH (ref 42.2–75.2)
NEUTROPHILS NFR BLD AUTO: 87.9 % — HIGH (ref 42.2–75.2)
NEUTROPHILS NFR BLD AUTO: 88.1 % — HIGH (ref 42.2–75.2)
NEUTROPHILS NFR BLD AUTO: 88.2 % — HIGH (ref 42.2–75.2)
NEUTROPHILS NFR BLD AUTO: 88.2 % — HIGH (ref 42.2–75.2)
NEUTROPHILS NFR BLD AUTO: 90.1 % — HIGH (ref 42.2–75.2)
NEUTROPHILS NFR BLD AUTO: 90.4 % — HIGH (ref 42.2–75.2)
NEUTROPHILS NFR BLD AUTO: 91.2 % — HIGH (ref 42.2–75.2)
NEUTROPHILS NFR BLD AUTO: 92 % — HIGH (ref 42.2–75.2)
NEUTROPHILS NFR BLD AUTO: 92.6 % — HIGH (ref 42.2–75.2)
NEUTS BAND # BLD: 4.4 % — SIGNIFICANT CHANGE UP (ref 0–6)
NITRITE UR-MCNC: NEGATIVE — SIGNIFICANT CHANGE UP
NITRITE UR-MCNC: NEGATIVE — SIGNIFICANT CHANGE UP
NON-GYNECOLOGICAL CYTOLOGY STUDY: SIGNIFICANT CHANGE UP
NON-GYNECOLOGICAL CYTOLOGY STUDY: SIGNIFICANT CHANGE UP
NRBC # BLD: 0 /100 WBCS — SIGNIFICANT CHANGE UP (ref 0–0)
OSMOLALITY UR: 318 MOS/KG — SIGNIFICANT CHANGE UP (ref 50–1400)
PCO2 BLDA: 36 MMHG — LOW (ref 38–42)
PCO2 BLDA: 36 MMHG — LOW (ref 38–42)
PCO2 BLDA: 38 MMHG — SIGNIFICANT CHANGE UP (ref 38–42)
PCO2 BLDA: 38 MMHG — SIGNIFICANT CHANGE UP (ref 38–42)
PCO2 BLDA: 41 MMHG — SIGNIFICANT CHANGE UP (ref 38–42)
PCO2 BLDA: 41 MMHG — SIGNIFICANT CHANGE UP (ref 38–42)
PCO2 BLDA: 43 MMHG — HIGH (ref 38–42)
PCO2 BLDA: 44 MMHG — HIGH (ref 38–42)
PCO2 BLDA: 46 MMHG — HIGH (ref 38–42)
PCO2 BLDA: 47 MMHG — HIGH (ref 38–42)
PCO2 BLDA: 50 MMHG — HIGH (ref 38–42)
PCO2 BLDA: 50 MMHG — HIGH (ref 38–42)
PCO2 BLDA: 53 MMHG — HIGH (ref 38–42)
PCO2 BLDV: 50 MMHG — SIGNIFICANT CHANGE UP (ref 41–51)
PCO2 BLDV: 56 MMHG — HIGH (ref 41–51)
PH BLDA: 7.31 — LOW (ref 7.38–7.42)
PH BLDA: 7.33 — LOW (ref 7.38–7.42)
PH BLDA: 7.33 — LOW (ref 7.38–7.42)
PH BLDA: 7.34 — LOW (ref 7.38–7.42)
PH BLDA: 7.35 — LOW (ref 7.38–7.42)
PH BLDA: 7.38 — SIGNIFICANT CHANGE UP (ref 7.38–7.42)
PH BLDA: 7.41 — SIGNIFICANT CHANGE UP (ref 7.38–7.42)
PH BLDA: 7.42 — SIGNIFICANT CHANGE UP (ref 7.38–7.42)
PH BLDA: 7.46 — HIGH (ref 7.38–7.42)
PH BLDV: 7.29 — SIGNIFICANT CHANGE UP (ref 7.26–7.43)
PH BLDV: 7.33 — SIGNIFICANT CHANGE UP (ref 7.26–7.43)
PH UR: 5 — SIGNIFICANT CHANGE UP (ref 5–8)
PH UR: 5.5 — SIGNIFICANT CHANGE UP (ref 5–8)
PH UR: 6 — SIGNIFICANT CHANGE UP (ref 5–8)
PHOSPHATE 24H UR-MCNC: <3 MG/DL — SIGNIFICANT CHANGE UP
PHOSPHATE SERPL-MCNC: 1.6 MG/DL — LOW (ref 2.1–4.9)
PHOSPHATE SERPL-MCNC: 4.4 MG/DL — SIGNIFICANT CHANGE UP (ref 2.1–4.9)
PLAT MORPH BLD: NORMAL — SIGNIFICANT CHANGE UP
PLATELET # BLD AUTO: 162 K/UL — SIGNIFICANT CHANGE UP (ref 130–400)
PLATELET # BLD AUTO: 201 K/UL — SIGNIFICANT CHANGE UP (ref 130–400)
PLATELET # BLD AUTO: 228 K/UL — SIGNIFICANT CHANGE UP (ref 130–400)
PLATELET # BLD AUTO: 233 K/UL — SIGNIFICANT CHANGE UP (ref 130–400)
PLATELET # BLD AUTO: 233 K/UL — SIGNIFICANT CHANGE UP (ref 130–400)
PLATELET # BLD AUTO: 234 K/UL — SIGNIFICANT CHANGE UP (ref 130–400)
PLATELET # BLD AUTO: 237 K/UL — SIGNIFICANT CHANGE UP (ref 130–400)
PLATELET # BLD AUTO: 244 K/UL — SIGNIFICANT CHANGE UP (ref 130–400)
PLATELET # BLD AUTO: 245 K/UL — SIGNIFICANT CHANGE UP (ref 130–400)
PLATELET # BLD AUTO: 254 K/UL — SIGNIFICANT CHANGE UP (ref 130–400)
PLATELET # BLD AUTO: 267 K/UL — SIGNIFICANT CHANGE UP (ref 130–400)
PLATELET # BLD AUTO: 285 K/UL — SIGNIFICANT CHANGE UP (ref 130–400)
PLATELET # BLD AUTO: 289 K/UL — SIGNIFICANT CHANGE UP (ref 130–400)
PLATELET # BLD AUTO: 297 K/UL — SIGNIFICANT CHANGE UP (ref 130–400)
PLATELET # BLD AUTO: 303 K/UL — SIGNIFICANT CHANGE UP (ref 130–400)
PLATELET # BLD AUTO: 311 K/UL — SIGNIFICANT CHANGE UP (ref 130–400)
PLATELET # BLD AUTO: 324 K/UL — SIGNIFICANT CHANGE UP (ref 130–400)
PLATELET # BLD AUTO: 358 K/UL — SIGNIFICANT CHANGE UP (ref 130–400)
PO2 BLDA: 100 MMHG — HIGH (ref 78–95)
PO2 BLDA: 114 MMHG — HIGH (ref 78–95)
PO2 BLDA: 118 MMHG — HIGH (ref 78–95)
PO2 BLDA: 130 MMHG — HIGH (ref 78–95)
PO2 BLDA: 135 MMHG — HIGH (ref 78–95)
PO2 BLDA: 139 MMHG — HIGH (ref 78–95)
PO2 BLDA: 152 MMHG — HIGH (ref 78–95)
PO2 BLDA: 267 MMHG — HIGH (ref 78–95)
PO2 BLDA: 57 MMHG — LOW (ref 78–95)
PO2 BLDA: 65 MMHG — LOW (ref 78–95)
PO2 BLDA: 65 MMHG — LOW (ref 78–95)
PO2 BLDA: 74 MMHG — LOW (ref 78–95)
PO2 BLDA: 87 MMHG — SIGNIFICANT CHANGE UP (ref 78–95)
PO2 BLDV: 34 MMHG — SIGNIFICANT CHANGE UP (ref 20–40)
PO2 BLDV: 36 MMHG — SIGNIFICANT CHANGE UP (ref 20–40)
POLYCHROMASIA BLD QL SMEAR: SIGNIFICANT CHANGE UP
POTASSIUM BLDV-SCNC: 4.7 MMOL/L — SIGNIFICANT CHANGE UP (ref 3.3–5.6)
POTASSIUM BLDV-SCNC: 4.9 MMOL/L — SIGNIFICANT CHANGE UP (ref 3.3–5.6)
POTASSIUM SERPL-MCNC: 2.8 MMOL/L — LOW (ref 3.5–5)
POTASSIUM SERPL-MCNC: 3.2 MMOL/L — LOW (ref 3.5–5)
POTASSIUM SERPL-MCNC: 4.5 MMOL/L — SIGNIFICANT CHANGE UP (ref 3.5–5)
POTASSIUM SERPL-MCNC: 4.7 MMOL/L — SIGNIFICANT CHANGE UP (ref 3.5–5)
POTASSIUM SERPL-MCNC: 4.8 MMOL/L — SIGNIFICANT CHANGE UP (ref 3.5–5)
POTASSIUM SERPL-MCNC: 4.9 MMOL/L — SIGNIFICANT CHANGE UP (ref 3.5–5)
POTASSIUM SERPL-MCNC: 5.1 MMOL/L — HIGH (ref 3.5–5)
POTASSIUM SERPL-MCNC: 5.2 MMOL/L — HIGH (ref 3.5–5)
POTASSIUM SERPL-MCNC: 5.2 MMOL/L — HIGH (ref 3.5–5)
POTASSIUM SERPL-MCNC: 5.3 MMOL/L — HIGH (ref 3.5–5)
POTASSIUM SERPL-MCNC: 5.3 MMOL/L — HIGH (ref 3.5–5)
POTASSIUM SERPL-MCNC: 5.4 MMOL/L — HIGH (ref 3.5–5)
POTASSIUM SERPL-MCNC: 5.5 MMOL/L — HIGH (ref 3.5–5)
POTASSIUM SERPL-MCNC: 6 MMOL/L — CRITICAL HIGH (ref 3.5–5)
POTASSIUM SERPL-SCNC: 2.8 MMOL/L — LOW (ref 3.5–5)
POTASSIUM SERPL-SCNC: 3.2 MMOL/L — LOW (ref 3.5–5)
POTASSIUM SERPL-SCNC: 4.5 MMOL/L — SIGNIFICANT CHANGE UP (ref 3.5–5)
POTASSIUM SERPL-SCNC: 4.7 MMOL/L — SIGNIFICANT CHANGE UP (ref 3.5–5)
POTASSIUM SERPL-SCNC: 4.8 MMOL/L — SIGNIFICANT CHANGE UP (ref 3.5–5)
POTASSIUM SERPL-SCNC: 4.9 MMOL/L — SIGNIFICANT CHANGE UP (ref 3.5–5)
POTASSIUM SERPL-SCNC: 5.1 MMOL/L — HIGH (ref 3.5–5)
POTASSIUM SERPL-SCNC: 5.2 MMOL/L — HIGH (ref 3.5–5)
POTASSIUM SERPL-SCNC: 5.2 MMOL/L — HIGH (ref 3.5–5)
POTASSIUM SERPL-SCNC: 5.3 MMOL/L — HIGH (ref 3.5–5)
POTASSIUM SERPL-SCNC: 5.3 MMOL/L — HIGH (ref 3.5–5)
POTASSIUM SERPL-SCNC: 5.4 MMOL/L — HIGH (ref 3.5–5)
POTASSIUM SERPL-SCNC: 5.5 MMOL/L — HIGH (ref 3.5–5)
POTASSIUM SERPL-SCNC: 6 MMOL/L — CRITICAL HIGH (ref 3.5–5)
POTASSIUM UR-SCNC: 38 MMOL/L — SIGNIFICANT CHANGE UP
POTASSIUM UR-SCNC: 43 MMOL/L — SIGNIFICANT CHANGE UP
PROT FLD-MCNC: 2.4 G/DL — SIGNIFICANT CHANGE UP
PROT FLD-MCNC: 3.6 G/DL — SIGNIFICANT CHANGE UP
PROT SERPL-MCNC: 4.6 G/DL — LOW (ref 6–8)
PROT SERPL-MCNC: 4.8 G/DL — LOW (ref 6–8)
PROT SERPL-MCNC: 5.6 G/DL — LOW (ref 6–8)
PROT SERPL-MCNC: 6.2 G/DL — SIGNIFICANT CHANGE UP (ref 6–8)
PROT UR-MCNC: (no result)
PROT UR-MCNC: 30 MG/DL
PROTHROM AB SERPL-ACNC: 15.7 SEC — HIGH (ref 9.95–12.87)
RBC # BLD: 2.15 M/UL — LOW (ref 4.2–5.4)
RBC # BLD: 2.37 M/UL — LOW (ref 4.2–5.4)
RBC # BLD: 2.37 M/UL — LOW (ref 4.2–5.4)
RBC # BLD: 2.42 M/UL — LOW (ref 4.2–5.4)
RBC # BLD: 2.56 M/UL — LOW (ref 4.2–5.4)
RBC # BLD: 2.59 M/UL — LOW (ref 4.2–5.4)
RBC # BLD: 2.63 M/UL — LOW (ref 4.2–5.4)
RBC # BLD: 2.67 M/UL — LOW (ref 4.2–5.4)
RBC # BLD: 2.68 M/UL — LOW (ref 4.2–5.4)
RBC # BLD: 2.7 M/UL — LOW (ref 4.2–5.4)
RBC # BLD: 2.71 M/UL — LOW (ref 4.2–5.4)
RBC # BLD: 2.73 M/UL — LOW (ref 4.2–5.4)
RBC # BLD: 2.74 M/UL — LOW (ref 4.2–5.4)
RBC # BLD: 2.82 M/UL — LOW (ref 4.2–5.4)
RBC # BLD: 2.93 M/UL — LOW (ref 4.2–5.4)
RBC # BLD: 3.01 M/UL — LOW (ref 4.2–5.4)
RBC # FLD: 18.5 % — HIGH (ref 11.5–14.5)
RBC # FLD: 18.6 % — HIGH (ref 11.5–14.5)
RBC # FLD: 18.7 % — HIGH (ref 11.5–14.5)
RBC # FLD: 18.7 % — HIGH (ref 11.5–14.5)
RBC # FLD: 18.9 % — HIGH (ref 11.5–14.5)
RBC # FLD: 18.9 % — HIGH (ref 11.5–14.5)
RBC # FLD: 19 % — HIGH (ref 11.5–14.5)
RBC # FLD: 19.2 % — HIGH (ref 11.5–14.5)
RBC # FLD: 19.3 % — HIGH (ref 11.5–14.5)
RBC # FLD: 19.4 % — HIGH (ref 11.5–14.5)
RBC # FLD: 19.6 % — HIGH (ref 11.5–14.5)
RBC # FLD: 19.7 % — HIGH (ref 11.5–14.5)
RBC # FLD: 19.8 % — HIGH (ref 11.5–14.5)
RBC # FLD: 19.8 % — HIGH (ref 11.5–14.5)
RBC # FLD: 19.9 % — HIGH (ref 11.5–14.5)
RBC # FLD: 20 % — HIGH (ref 11.5–14.5)
RBC # FLD: 20.2 % — HIGH (ref 11.5–14.5)
RBC # FLD: 20.6 % — HIGH (ref 11.5–14.5)
RBC BLD AUTO: NORMAL — SIGNIFICANT CHANGE UP
RBC CASTS # UR COMP ASSIST: (no result) /HPF
RCV VOL RI: 0 /UL — SIGNIFICANT CHANGE UP (ref 0–5)
RCV VOL RI: 1000 /UL — HIGH (ref 0–5)
S PNEUM AG UR QL: NEGATIVE — SIGNIFICANT CHANGE UP
SAO2 % BLDA: 100 % — HIGH (ref 94–98)
SAO2 % BLDA: 101 % — HIGH (ref 94–98)
SAO2 % BLDA: 90 % — LOW (ref 94–98)
SAO2 % BLDA: 93 % — LOW (ref 94–98)
SAO2 % BLDA: 94 % — SIGNIFICANT CHANGE UP (ref 94–98)
SAO2 % BLDA: 95 % — SIGNIFICANT CHANGE UP (ref 94–98)
SAO2 % BLDA: 98 % — SIGNIFICANT CHANGE UP (ref 94–98)
SAO2 % BLDA: 99 % — HIGH (ref 94–98)
SAO2 % BLDA: 99 % — HIGH (ref 94–98)
SAO2 % BLDV: 58 % — SIGNIFICANT CHANGE UP
SAO2 % BLDV: 58 % — SIGNIFICANT CHANGE UP
SODIUM SERPL-SCNC: 133 MMOL/L — LOW (ref 135–146)
SODIUM SERPL-SCNC: 134 MMOL/L — LOW (ref 135–146)
SODIUM SERPL-SCNC: 135 MMOL/L — SIGNIFICANT CHANGE UP (ref 135–146)
SODIUM SERPL-SCNC: 135 MMOL/L — SIGNIFICANT CHANGE UP (ref 135–146)
SODIUM SERPL-SCNC: 137 MMOL/L — SIGNIFICANT CHANGE UP (ref 135–146)
SODIUM SERPL-SCNC: 138 MMOL/L — SIGNIFICANT CHANGE UP (ref 135–146)
SODIUM SERPL-SCNC: 139 MMOL/L — SIGNIFICANT CHANGE UP (ref 135–146)
SODIUM SERPL-SCNC: 140 MMOL/L — SIGNIFICANT CHANGE UP (ref 135–146)
SODIUM SERPL-SCNC: 140 MMOL/L — SIGNIFICANT CHANGE UP (ref 135–146)
SODIUM SERPL-SCNC: 141 MMOL/L — SIGNIFICANT CHANGE UP (ref 135–146)
SODIUM SERPL-SCNC: 142 MMOL/L — SIGNIFICANT CHANGE UP (ref 135–146)
SODIUM SERPL-SCNC: 143 MMOL/L — SIGNIFICANT CHANGE UP (ref 135–146)
SODIUM SERPL-SCNC: 144 MMOL/L — SIGNIFICANT CHANGE UP (ref 135–146)
SODIUM SERPL-SCNC: 145 MMOL/L — SIGNIFICANT CHANGE UP (ref 135–146)
SODIUM UR-SCNC: 40 MMOL/L — SIGNIFICANT CHANGE UP
SODIUM UR-SCNC: <20 MMOL/L — SIGNIFICANT CHANGE UP
SP GR SPEC: 1.02 — SIGNIFICANT CHANGE UP (ref 1.01–1.03)
SP GR SPEC: >=1.03 — SIGNIFICANT CHANGE UP (ref 1.01–1.03)
SPECIMEN SOURCE: SIGNIFICANT CHANGE UP
STOMATOCYTES BLD QL SMEAR: SIGNIFICANT CHANGE UP
TOTAL NUCLEATED CELL COUNT, BODY FLUID: 350 /UL — HIGH (ref 0–5)
TOTAL NUCLEATED CELL COUNT, BODY FLUID: 39 /UL — HIGH (ref 0–5)
TROPONIN T SERPL-MCNC: 0.01 NG/ML — SIGNIFICANT CHANGE UP
TROPONIN T SERPL-MCNC: <0.01 NG/ML — SIGNIFICANT CHANGE UP
TUBE TYPE: SIGNIFICANT CHANGE UP
TUBE TYPE: SIGNIFICANT CHANGE UP
TYPE + AB SCN PNL BLD: SIGNIFICANT CHANGE UP
UROBILINOGEN FLD QL: 0.2 MG/DL — SIGNIFICANT CHANGE UP (ref 0.2–0.2)
UROBILINOGEN FLD QL: 0.2 — SIGNIFICANT CHANGE UP (ref 0.2–0.2)
UUN UR-MCNC: 246 MG/DL — SIGNIFICANT CHANGE UP
VANCOMYCIN TROUGH SERPL-MCNC: 13.3 UG/ML — HIGH (ref 5–10)
WBC # BLD: 11.83 K/UL — HIGH (ref 4.8–10.8)
WBC # BLD: 12.74 K/UL — HIGH (ref 4.8–10.8)
WBC # BLD: 13.13 K/UL — HIGH (ref 4.8–10.8)
WBC # BLD: 13.47 K/UL — HIGH (ref 4.8–10.8)
WBC # BLD: 14.67 K/UL — HIGH (ref 4.8–10.8)
WBC # BLD: 15.06 K/UL — HIGH (ref 4.8–10.8)
WBC # BLD: 18.1 K/UL — HIGH (ref 4.8–10.8)
WBC # BLD: 18.38 K/UL — HIGH (ref 4.8–10.8)
WBC # BLD: 24.64 K/UL — HIGH (ref 4.8–10.8)
WBC # BLD: 27.07 K/UL — HIGH (ref 4.8–10.8)
WBC # BLD: 27.22 K/UL — HIGH (ref 4.8–10.8)
WBC # BLD: 27.85 K/UL — HIGH (ref 4.8–10.8)
WBC # BLD: 28.73 K/UL — HIGH (ref 4.8–10.8)
WBC # BLD: 28.78 K/UL — HIGH (ref 4.8–10.8)
WBC # BLD: 34.46 K/UL — HIGH (ref 4.8–10.8)
WBC # BLD: 43.66 K/UL — CRITICAL HIGH (ref 4.8–10.8)
WBC # BLD: 9.01 K/UL — SIGNIFICANT CHANGE UP (ref 4.8–10.8)
WBC # BLD: 9.83 K/UL — SIGNIFICANT CHANGE UP (ref 4.8–10.8)
WBC # FLD AUTO: 11.83 K/UL — HIGH (ref 4.8–10.8)
WBC # FLD AUTO: 12.74 K/UL — HIGH (ref 4.8–10.8)
WBC # FLD AUTO: 13.13 K/UL — HIGH (ref 4.8–10.8)
WBC # FLD AUTO: 13.47 K/UL — HIGH (ref 4.8–10.8)
WBC # FLD AUTO: 14.67 K/UL — HIGH (ref 4.8–10.8)
WBC # FLD AUTO: 15.06 K/UL — HIGH (ref 4.8–10.8)
WBC # FLD AUTO: 18.1 K/UL — HIGH (ref 4.8–10.8)
WBC # FLD AUTO: 18.38 K/UL — HIGH (ref 4.8–10.8)
WBC # FLD AUTO: 24.64 K/UL — HIGH (ref 4.8–10.8)
WBC # FLD AUTO: 27.07 K/UL — HIGH (ref 4.8–10.8)
WBC # FLD AUTO: 27.22 K/UL — HIGH (ref 4.8–10.8)
WBC # FLD AUTO: 27.85 K/UL — HIGH (ref 4.8–10.8)
WBC # FLD AUTO: 28.73 K/UL — HIGH (ref 4.8–10.8)
WBC # FLD AUTO: 28.78 K/UL — HIGH (ref 4.8–10.8)
WBC # FLD AUTO: 34.46 K/UL — HIGH (ref 4.8–10.8)
WBC # FLD AUTO: 43.66 K/UL — CRITICAL HIGH (ref 4.8–10.8)
WBC # FLD AUTO: 9.01 K/UL — SIGNIFICANT CHANGE UP (ref 4.8–10.8)
WBC # FLD AUTO: 9.83 K/UL — SIGNIFICANT CHANGE UP (ref 4.8–10.8)
WBC UR QL: SIGNIFICANT CHANGE UP /HPF

## 2018-01-01 RX ORDER — HEPARIN SODIUM 5000 [USP'U]/ML
5000 INJECTION INTRAVENOUS; SUBCUTANEOUS EVERY 8 HOURS
Qty: 0 | Refills: 0 | Status: DISCONTINUED | OUTPATIENT
Start: 2018-01-01 | End: 2018-01-01

## 2018-01-01 RX ORDER — LACTULOSE 10 G/15ML
1 SOLUTION ORAL
Qty: 0 | Refills: 0 | COMMUNITY

## 2018-01-01 RX ORDER — PANTOPRAZOLE SODIUM 20 MG/1
40 TABLET, DELAYED RELEASE ORAL
Qty: 0 | Refills: 0 | Status: DISCONTINUED | OUTPATIENT
Start: 2018-01-01 | End: 2018-01-01

## 2018-01-01 RX ORDER — ROBINUL 0.2 MG/ML
0.4 INJECTION INTRAMUSCULAR; INTRAVENOUS ONCE
Qty: 0 | Refills: 0 | Status: COMPLETED | OUTPATIENT
Start: 2018-01-01 | End: 2018-01-01

## 2018-01-01 RX ORDER — FUROSEMIDE 40 MG
40 TABLET ORAL ONCE
Qty: 0 | Refills: 0 | Status: COMPLETED | OUTPATIENT
Start: 2018-01-01 | End: 2018-01-01

## 2018-01-01 RX ORDER — ACETAMINOPHEN 500 MG
650 TABLET ORAL EVERY 6 HOURS
Qty: 0 | Refills: 0 | Status: DISCONTINUED | OUTPATIENT
Start: 2018-01-01 | End: 2018-01-01

## 2018-01-01 RX ORDER — POTASSIUM CHLORIDE 20 MEQ
20 PACKET (EA) ORAL
Qty: 0 | Refills: 0 | Status: DISCONTINUED | OUTPATIENT
Start: 2018-01-01 | End: 2018-01-01

## 2018-01-01 RX ORDER — SODIUM CHLORIDE 9 MG/ML
500 INJECTION, SOLUTION INTRAVENOUS
Qty: 0 | Refills: 0 | Status: DISCONTINUED | OUTPATIENT
Start: 2018-01-01 | End: 2018-01-01

## 2018-01-01 RX ORDER — VANCOMYCIN HCL 1 G
1000 VIAL (EA) INTRAVENOUS EVERY 12 HOURS
Qty: 0 | Refills: 0 | Status: DISCONTINUED | OUTPATIENT
Start: 2018-01-01 | End: 2018-01-01

## 2018-01-01 RX ORDER — MIDODRINE HYDROCHLORIDE 2.5 MG/1
10 TABLET ORAL ONCE
Qty: 0 | Refills: 0 | Status: COMPLETED | OUTPATIENT
Start: 2018-01-01 | End: 2018-01-01

## 2018-01-01 RX ORDER — VANCOMYCIN HCL 1 G
750 VIAL (EA) INTRAVENOUS DAILY
Qty: 0 | Refills: 0 | Status: DISCONTINUED | OUTPATIENT
Start: 2018-01-01 | End: 2018-01-01

## 2018-01-01 RX ORDER — MORPHINE SULFATE 50 MG/1
2 CAPSULE, EXTENDED RELEASE ORAL
Qty: 0 | Refills: 0 | Status: DISCONTINUED | OUTPATIENT
Start: 2018-01-01 | End: 2018-01-01

## 2018-01-01 RX ORDER — SODIUM CHLORIDE 9 MG/ML
1000 INJECTION, SOLUTION INTRAVENOUS ONCE
Qty: 0 | Refills: 0 | Status: COMPLETED | OUTPATIENT
Start: 2018-01-01 | End: 2018-01-01

## 2018-01-01 RX ORDER — MORPHINE SULFATE 50 MG/1
4 CAPSULE, EXTENDED RELEASE ORAL ONCE
Qty: 0 | Refills: 0 | Status: DISCONTINUED | OUTPATIENT
Start: 2018-01-01 | End: 2018-01-01

## 2018-01-01 RX ORDER — MORPHINE SULFATE 50 MG/1
4 CAPSULE, EXTENDED RELEASE ORAL
Qty: 0 | Refills: 0 | Status: DISCONTINUED | OUTPATIENT
Start: 2018-01-01 | End: 2018-01-01

## 2018-01-01 RX ORDER — FENTANYL CITRATE 50 UG/ML
1 INJECTION INTRAVENOUS
Qty: 2500 | Refills: 0 | Status: DISCONTINUED | OUTPATIENT
Start: 2018-01-01 | End: 2018-01-01

## 2018-01-01 RX ORDER — SODIUM CHLORIDE 9 MG/ML
1000 INJECTION INTRAMUSCULAR; INTRAVENOUS; SUBCUTANEOUS ONCE
Qty: 0 | Refills: 0 | Status: COMPLETED | OUTPATIENT
Start: 2018-01-01 | End: 2018-01-01

## 2018-01-01 RX ORDER — MIRTAZAPINE 45 MG/1
0 TABLET, ORALLY DISINTEGRATING ORAL
Qty: 0 | Refills: 0 | COMMUNITY

## 2018-01-01 RX ORDER — CEFEPIME 1 G/1
1000 INJECTION, POWDER, FOR SOLUTION INTRAMUSCULAR; INTRAVENOUS DAILY
Qty: 0 | Refills: 0 | Status: DISCONTINUED | OUTPATIENT
Start: 2018-01-01 | End: 2018-01-01

## 2018-01-01 RX ORDER — DEXTROSE 50 % IN WATER 50 %
50 SYRINGE (ML) INTRAVENOUS ONCE
Qty: 0 | Refills: 0 | Status: COMPLETED | OUTPATIENT
Start: 2018-01-01 | End: 2018-01-01

## 2018-01-01 RX ORDER — MORPHINE SULFATE 50 MG/1
2 CAPSULE, EXTENDED RELEASE ORAL EVERY 4 HOURS
Qty: 0 | Refills: 0 | Status: DISCONTINUED | OUTPATIENT
Start: 2018-01-01 | End: 2018-01-01

## 2018-01-01 RX ORDER — CHLORHEXIDINE GLUCONATE 213 G/1000ML
15 SOLUTION TOPICAL
Qty: 0 | Refills: 0 | Status: DISCONTINUED | OUTPATIENT
Start: 2018-01-01 | End: 2018-01-01

## 2018-01-01 RX ORDER — SODIUM CHLORIDE 9 MG/ML
500 INJECTION, SOLUTION INTRAVENOUS ONCE
Qty: 0 | Refills: 0 | Status: COMPLETED | OUTPATIENT
Start: 2018-01-01 | End: 2018-01-01

## 2018-01-01 RX ORDER — POLYETHYLENE GLYCOL 3350 17 G/17G
17 POWDER, FOR SOLUTION ORAL
Qty: 0 | Refills: 0 | Status: DISCONTINUED | OUTPATIENT
Start: 2018-01-01 | End: 2018-01-01

## 2018-01-01 RX ORDER — GABAPENTIN 400 MG/1
0 CAPSULE ORAL
Qty: 0 | Refills: 0 | COMMUNITY

## 2018-01-01 RX ORDER — NOREPINEPHRINE BITARTRATE/D5W 8 MG/250ML
0.02 PLASTIC BAG, INJECTION (ML) INTRAVENOUS
Qty: 16 | Refills: 0 | Status: DISCONTINUED | OUTPATIENT
Start: 2018-01-01 | End: 2018-01-01

## 2018-01-01 RX ORDER — LACTULOSE 10 G/15ML
15 SOLUTION ORAL EVERY 4 HOURS
Qty: 0 | Refills: 0 | Status: DISCONTINUED | OUTPATIENT
Start: 2018-01-01 | End: 2018-01-01

## 2018-01-01 RX ORDER — SODIUM CHLORIDE 9 MG/ML
1000 INJECTION INTRAMUSCULAR; INTRAVENOUS; SUBCUTANEOUS
Qty: 0 | Refills: 0 | Status: DISCONTINUED | OUTPATIENT
Start: 2018-01-01 | End: 2018-01-01

## 2018-01-01 RX ORDER — SODIUM CHLORIDE 9 MG/ML
1000 INJECTION, SOLUTION INTRAVENOUS
Qty: 0 | Refills: 0 | Status: DISCONTINUED | OUTPATIENT
Start: 2018-01-01 | End: 2018-01-01

## 2018-01-01 RX ORDER — FENTANYL CITRATE 50 UG/ML
25 INJECTION INTRAVENOUS ONCE
Qty: 0 | Refills: 0 | Status: DISCONTINUED | OUTPATIENT
Start: 2018-01-01 | End: 2018-01-01

## 2018-01-01 RX ORDER — GABAPENTIN 400 MG/1
1 CAPSULE ORAL
Qty: 0 | Refills: 0 | COMMUNITY

## 2018-01-01 RX ORDER — FUROSEMIDE 40 MG
20 TABLET ORAL DAILY
Qty: 0 | Refills: 0 | Status: DISCONTINUED | OUTPATIENT
Start: 2018-01-01 | End: 2018-01-01

## 2018-01-01 RX ORDER — PIPERACILLIN AND TAZOBACTAM 4; .5 G/20ML; G/20ML
4.5 INJECTION, POWDER, LYOPHILIZED, FOR SOLUTION INTRAVENOUS EVERY 6 HOURS
Qty: 0 | Refills: 0 | Status: DISCONTINUED | OUTPATIENT
Start: 2018-01-01 | End: 2018-01-01

## 2018-01-01 RX ORDER — POLYETHYLENE GLYCOL 3350 17 G/17G
17 POWDER, FOR SOLUTION ORAL DAILY
Qty: 0 | Refills: 0 | Status: DISCONTINUED | OUTPATIENT
Start: 2018-01-01 | End: 2018-01-01

## 2018-01-01 RX ORDER — FENTANYL CITRATE 50 UG/ML
0.1 INJECTION INTRAVENOUS
Qty: 2500 | Refills: 0 | Status: DISCONTINUED | OUTPATIENT
Start: 2018-01-01 | End: 2018-01-01

## 2018-01-01 RX ORDER — VANCOMYCIN HCL 1 G
1000 VIAL (EA) INTRAVENOUS ONCE
Qty: 0 | Refills: 0 | Status: COMPLETED | OUTPATIENT
Start: 2018-01-01 | End: 2018-01-01

## 2018-01-01 RX ORDER — GABAPENTIN 400 MG/1
100 CAPSULE ORAL THREE TIMES A DAY
Qty: 0 | Refills: 0 | Status: DISCONTINUED | OUTPATIENT
Start: 2018-01-01 | End: 2018-01-01

## 2018-01-01 RX ORDER — MAGNESIUM SULFATE 500 MG/ML
2 VIAL (ML) INJECTION ONCE
Qty: 0 | Refills: 0 | Status: COMPLETED | OUTPATIENT
Start: 2018-01-01 | End: 2018-01-01

## 2018-01-01 RX ORDER — FUROSEMIDE 40 MG
80 TABLET ORAL ONCE
Qty: 0 | Refills: 0 | Status: COMPLETED | OUTPATIENT
Start: 2018-01-01 | End: 2018-01-01

## 2018-01-01 RX ORDER — MIDAZOLAM HYDROCHLORIDE 1 MG/ML
2 INJECTION, SOLUTION INTRAMUSCULAR; INTRAVENOUS ONCE
Qty: 0 | Refills: 0 | Status: DISCONTINUED | OUTPATIENT
Start: 2018-01-01 | End: 2018-01-01

## 2018-01-01 RX ORDER — LACTULOSE 10 G/15ML
10 SOLUTION ORAL THREE TIMES A DAY
Qty: 0 | Refills: 0 | Status: DISCONTINUED | OUTPATIENT
Start: 2018-01-01 | End: 2018-01-01

## 2018-01-01 RX ORDER — MIRTAZAPINE 45 MG/1
15 TABLET, ORALLY DISINTEGRATING ORAL AT BEDTIME
Qty: 0 | Refills: 0 | Status: DISCONTINUED | OUTPATIENT
Start: 2018-01-01 | End: 2018-01-01

## 2018-01-01 RX ORDER — KETOROLAC TROMETHAMINE 30 MG/ML
1 SYRINGE (ML) INJECTION
Qty: 0 | Refills: 0 | COMMUNITY

## 2018-01-01 RX ORDER — SODIUM CHLORIDE 9 MG/ML
500 INJECTION INTRAMUSCULAR; INTRAVENOUS; SUBCUTANEOUS ONCE
Qty: 0 | Refills: 0 | Status: DISCONTINUED | OUTPATIENT
Start: 2018-01-01 | End: 2018-01-01

## 2018-01-01 RX ORDER — MIDAZOLAM HYDROCHLORIDE 1 MG/ML
0.1 INJECTION, SOLUTION INTRAMUSCULAR; INTRAVENOUS
Qty: 100 | Refills: 0 | Status: DISCONTINUED | OUTPATIENT
Start: 2018-01-01 | End: 2018-01-01

## 2018-01-01 RX ORDER — DILTIAZEM HCL 120 MG
1 CAPSULE, EXT RELEASE 24 HR ORAL
Qty: 0 | Refills: 0 | COMMUNITY

## 2018-01-01 RX ORDER — LACTULOSE 10 G/15ML
0 SOLUTION ORAL
Qty: 0 | Refills: 0 | COMMUNITY

## 2018-01-01 RX ORDER — KETOROLAC TROMETHAMINE 30 MG/ML
0 SYRINGE (ML) INJECTION
Qty: 0 | Refills: 0 | COMMUNITY

## 2018-01-01 RX ORDER — FUROSEMIDE 40 MG
1 TABLET ORAL
Qty: 0 | Refills: 0 | COMMUNITY

## 2018-01-01 RX ORDER — MORPHINE SULFATE 50 MG/1
2 CAPSULE, EXTENDED RELEASE ORAL
Qty: 100 | Refills: 0 | Status: DISCONTINUED | OUTPATIENT
Start: 2018-01-01 | End: 2018-01-01

## 2018-01-01 RX ORDER — PANTOPRAZOLE SODIUM 20 MG/1
1 TABLET, DELAYED RELEASE ORAL
Qty: 0 | Refills: 0 | COMMUNITY

## 2018-01-01 RX ORDER — FENTANYL CITRATE 50 UG/ML
0.5 INJECTION INTRAVENOUS
Qty: 2500 | Refills: 0 | Status: DISCONTINUED | OUTPATIENT
Start: 2018-01-01 | End: 2018-01-01

## 2018-01-01 RX ORDER — ACETAMINOPHEN 500 MG
650 TABLET ORAL ONCE
Qty: 0 | Refills: 0 | Status: COMPLETED | OUTPATIENT
Start: 2018-01-01 | End: 2018-01-01

## 2018-01-01 RX ORDER — MIDODRINE HYDROCHLORIDE 2.5 MG/1
10 TABLET ORAL THREE TIMES A DAY
Qty: 0 | Refills: 0 | Status: DISCONTINUED | OUTPATIENT
Start: 2018-01-01 | End: 2018-01-01

## 2018-01-01 RX ORDER — VANCOMYCIN HCL 1 G
2000 VIAL (EA) INTRAVENOUS ONCE
Qty: 0 | Refills: 0 | Status: COMPLETED | OUTPATIENT
Start: 2018-01-01 | End: 2018-01-01

## 2018-01-01 RX ORDER — MIRTAZAPINE 45 MG/1
1 TABLET, ORALLY DISINTEGRATING ORAL
Qty: 0 | Refills: 0 | COMMUNITY

## 2018-01-01 RX ORDER — VANCOMYCIN HCL 1 G
VIAL (EA) INTRAVENOUS
Qty: 0 | Refills: 0 | Status: COMPLETED | OUTPATIENT
Start: 2018-01-01 | End: 2018-01-01

## 2018-01-01 RX ORDER — MIDAZOLAM HYDROCHLORIDE 1 MG/ML
4 INJECTION, SOLUTION INTRAMUSCULAR; INTRAVENOUS ONCE
Qty: 0 | Refills: 0 | Status: DISCONTINUED | OUTPATIENT
Start: 2018-01-01 | End: 2018-01-01

## 2018-01-01 RX ORDER — RANITIDINE HYDROCHLORIDE 150 MG/1
0 TABLET, FILM COATED ORAL
Qty: 0 | Refills: 0 | COMMUNITY

## 2018-01-01 RX ORDER — DILTIAZEM HCL 120 MG
240 CAPSULE, EXT RELEASE 24 HR ORAL DAILY
Qty: 0 | Refills: 0 | Status: DISCONTINUED | OUTPATIENT
Start: 2018-01-01 | End: 2018-01-01

## 2018-01-01 RX ORDER — CEFEPIME 1 G/1
1000 INJECTION, POWDER, FOR SOLUTION INTRAMUSCULAR; INTRAVENOUS ONCE
Qty: 0 | Refills: 0 | Status: COMPLETED | OUTPATIENT
Start: 2018-01-01 | End: 2018-01-01

## 2018-01-01 RX ORDER — FUROSEMIDE 40 MG
0 TABLET ORAL
Qty: 0 | Refills: 0 | COMMUNITY

## 2018-01-01 RX ORDER — CEFTRIAXONE 500 MG/1
1 INJECTION, POWDER, FOR SOLUTION INTRAMUSCULAR; INTRAVENOUS ONCE
Qty: 0 | Refills: 0 | Status: COMPLETED | OUTPATIENT
Start: 2018-01-01 | End: 2018-01-01

## 2018-01-01 RX ORDER — PIPERACILLIN AND TAZOBACTAM 4; .5 G/20ML; G/20ML
4.5 INJECTION, POWDER, LYOPHILIZED, FOR SOLUTION INTRAVENOUS ONCE
Qty: 0 | Refills: 0 | Status: COMPLETED | OUTPATIENT
Start: 2018-01-01 | End: 2018-01-01

## 2018-01-01 RX ORDER — PANTOPRAZOLE SODIUM 20 MG/1
0 TABLET, DELAYED RELEASE ORAL
Qty: 0 | Refills: 0 | COMMUNITY

## 2018-01-01 RX ORDER — SODIUM CHLORIDE 9 MG/ML
500 INJECTION INTRAMUSCULAR; INTRAVENOUS; SUBCUTANEOUS ONCE
Qty: 0 | Refills: 0 | Status: COMPLETED | OUTPATIENT
Start: 2018-01-01 | End: 2018-01-01

## 2018-01-01 RX ORDER — NOREPINEPHRINE BITARTRATE/D5W 8 MG/250ML
0.02 PLASTIC BAG, INJECTION (ML) INTRAVENOUS
Qty: 8 | Refills: 0 | Status: DISCONTINUED | OUTPATIENT
Start: 2018-01-01 | End: 2018-01-01

## 2018-01-01 RX ORDER — VANCOMYCIN HCL 1 G
1000 VIAL (EA) INTRAVENOUS DAILY
Qty: 0 | Refills: 0 | Status: DISCONTINUED | OUTPATIENT
Start: 2018-01-01 | End: 2018-01-01

## 2018-01-01 RX ORDER — PIPERACILLIN AND TAZOBACTAM 4; .5 G/20ML; G/20ML
3.38 INJECTION, POWDER, LYOPHILIZED, FOR SOLUTION INTRAVENOUS EVERY 6 HOURS
Qty: 0 | Refills: 0 | Status: DISCONTINUED | OUTPATIENT
Start: 2018-01-01 | End: 2018-01-01

## 2018-01-01 RX ORDER — AZITHROMYCIN 500 MG/1
500 TABLET, FILM COATED ORAL ONCE
Qty: 0 | Refills: 0 | Status: COMPLETED | OUTPATIENT
Start: 2018-01-01 | End: 2018-01-01

## 2018-01-01 RX ADMIN — MIDODRINE HYDROCHLORIDE 10 MILLIGRAM(S): 2.5 TABLET ORAL at 05:37

## 2018-01-01 RX ADMIN — HEPARIN SODIUM 5000 UNIT(S): 5000 INJECTION INTRAVENOUS; SUBCUTANEOUS at 05:08

## 2018-01-01 RX ADMIN — MIDODRINE HYDROCHLORIDE 10 MILLIGRAM(S): 2.5 TABLET ORAL at 06:37

## 2018-01-01 RX ADMIN — SODIUM CHLORIDE 500 MILLILITER(S): 9 INJECTION, SOLUTION INTRAVENOUS at 11:14

## 2018-01-01 RX ADMIN — HEPARIN SODIUM 5000 UNIT(S): 5000 INJECTION INTRAVENOUS; SUBCUTANEOUS at 14:17

## 2018-01-01 RX ADMIN — PIPERACILLIN AND TAZOBACTAM 25 GRAM(S): 4; .5 INJECTION, POWDER, LYOPHILIZED, FOR SOLUTION INTRAVENOUS at 05:08

## 2018-01-01 RX ADMIN — MIDODRINE HYDROCHLORIDE 10 MILLIGRAM(S): 2.5 TABLET ORAL at 14:16

## 2018-01-01 RX ADMIN — Medication 650 MILLIGRAM(S): at 00:00

## 2018-01-01 RX ADMIN — HEPARIN SODIUM 5000 UNIT(S): 5000 INJECTION INTRAVENOUS; SUBCUTANEOUS at 06:37

## 2018-01-01 RX ADMIN — MIDODRINE HYDROCHLORIDE 10 MILLIGRAM(S): 2.5 TABLET ORAL at 21:46

## 2018-01-01 RX ADMIN — PANTOPRAZOLE SODIUM 40 MILLIGRAM(S): 20 TABLET, DELAYED RELEASE ORAL at 06:00

## 2018-01-01 RX ADMIN — GABAPENTIN 100 MILLIGRAM(S): 400 CAPSULE ORAL at 14:16

## 2018-01-01 RX ADMIN — HEPARIN SODIUM 5000 UNIT(S): 5000 INJECTION INTRAVENOUS; SUBCUTANEOUS at 16:45

## 2018-01-01 RX ADMIN — CHLORHEXIDINE GLUCONATE 15 MILLILITER(S): 213 SOLUTION TOPICAL at 18:31

## 2018-01-01 RX ADMIN — CHLORHEXIDINE GLUCONATE 15 MILLILITER(S): 213 SOLUTION TOPICAL at 05:37

## 2018-01-01 RX ADMIN — CEFTRIAXONE 100 GRAM(S): 500 INJECTION, POWDER, FOR SOLUTION INTRAMUSCULAR; INTRAVENOUS at 20:07

## 2018-01-01 RX ADMIN — GABAPENTIN 100 MILLIGRAM(S): 400 CAPSULE ORAL at 06:02

## 2018-01-01 RX ADMIN — MIDODRINE HYDROCHLORIDE 10 MILLIGRAM(S): 2.5 TABLET ORAL at 21:41

## 2018-01-01 RX ADMIN — GABAPENTIN 100 MILLIGRAM(S): 400 CAPSULE ORAL at 18:32

## 2018-01-01 RX ADMIN — HEPARIN SODIUM 5000 UNIT(S): 5000 INJECTION INTRAVENOUS; SUBCUTANEOUS at 21:15

## 2018-01-01 RX ADMIN — GABAPENTIN 100 MILLIGRAM(S): 400 CAPSULE ORAL at 21:15

## 2018-01-01 RX ADMIN — SODIUM CHLORIDE 2000 MILLILITER(S): 9 INJECTION, SOLUTION INTRAVENOUS at 15:26

## 2018-01-01 RX ADMIN — PIPERACILLIN AND TAZOBACTAM 25 GRAM(S): 4; .5 INJECTION, POWDER, LYOPHILIZED, FOR SOLUTION INTRAVENOUS at 23:42

## 2018-01-01 RX ADMIN — FENTANYL CITRATE 25 MICROGRAM(S): 50 INJECTION INTRAVENOUS at 16:12

## 2018-01-01 RX ADMIN — PIPERACILLIN AND TAZOBACTAM 200 GRAM(S): 4; .5 INJECTION, POWDER, LYOPHILIZED, FOR SOLUTION INTRAVENOUS at 23:28

## 2018-01-01 RX ADMIN — PIPERACILLIN AND TAZOBACTAM 200 GRAM(S): 4; .5 INJECTION, POWDER, LYOPHILIZED, FOR SOLUTION INTRAVENOUS at 05:03

## 2018-01-01 RX ADMIN — HEPARIN SODIUM 5000 UNIT(S): 5000 INJECTION INTRAVENOUS; SUBCUTANEOUS at 21:31

## 2018-01-01 RX ADMIN — GABAPENTIN 100 MILLIGRAM(S): 400 CAPSULE ORAL at 05:01

## 2018-01-01 RX ADMIN — Medication 650 MILLIGRAM(S): at 22:35

## 2018-01-01 RX ADMIN — MIDODRINE HYDROCHLORIDE 10 MILLIGRAM(S): 2.5 TABLET ORAL at 05:01

## 2018-01-01 RX ADMIN — LACTULOSE 10 GRAM(S): 10 SOLUTION ORAL at 06:29

## 2018-01-01 RX ADMIN — MIDODRINE HYDROCHLORIDE 10 MILLIGRAM(S): 2.5 TABLET ORAL at 21:14

## 2018-01-01 RX ADMIN — HEPARIN SODIUM 5000 UNIT(S): 5000 INJECTION INTRAVENOUS; SUBCUTANEOUS at 21:41

## 2018-01-01 RX ADMIN — MIDODRINE HYDROCHLORIDE 10 MILLIGRAM(S): 2.5 TABLET ORAL at 14:45

## 2018-01-01 RX ADMIN — CHLORHEXIDINE GLUCONATE 15 MILLILITER(S): 213 SOLUTION TOPICAL at 17:43

## 2018-01-01 RX ADMIN — FENTANYL CITRATE 0.62 MICROGRAM(S)/KG/HR: 50 INJECTION INTRAVENOUS at 14:19

## 2018-01-01 RX ADMIN — Medication 250 MILLIGRAM(S): at 14:05

## 2018-01-01 RX ADMIN — Medication 10 MILLIGRAM(S): at 12:28

## 2018-01-01 RX ADMIN — GABAPENTIN 100 MILLIGRAM(S): 400 CAPSULE ORAL at 21:39

## 2018-01-01 RX ADMIN — HEPARIN SODIUM 5000 UNIT(S): 5000 INJECTION INTRAVENOUS; SUBCUTANEOUS at 15:25

## 2018-01-01 RX ADMIN — CHLORHEXIDINE GLUCONATE 15 MILLILITER(S): 213 SOLUTION TOPICAL at 05:43

## 2018-01-01 RX ADMIN — PANTOPRAZOLE SODIUM 40 MILLIGRAM(S): 20 TABLET, DELAYED RELEASE ORAL at 06:10

## 2018-01-01 RX ADMIN — PANTOPRAZOLE SODIUM 40 MILLIGRAM(S): 20 TABLET, DELAYED RELEASE ORAL at 06:09

## 2018-01-01 RX ADMIN — GABAPENTIN 100 MILLIGRAM(S): 400 CAPSULE ORAL at 14:28

## 2018-01-01 RX ADMIN — HEPARIN SODIUM 5000 UNIT(S): 5000 INJECTION INTRAVENOUS; SUBCUTANEOUS at 14:10

## 2018-01-01 RX ADMIN — MIDAZOLAM HYDROCHLORIDE 4 MILLIGRAM(S): 1 INJECTION, SOLUTION INTRAMUSCULAR; INTRAVENOUS at 16:11

## 2018-01-01 RX ADMIN — FENTANYL CITRATE 0.62 MICROGRAM(S)/KG/HR: 50 INJECTION INTRAVENOUS at 12:14

## 2018-01-01 RX ADMIN — MIDODRINE HYDROCHLORIDE 10 MILLIGRAM(S): 2.5 TABLET ORAL at 06:07

## 2018-01-01 RX ADMIN — SODIUM CHLORIDE 1000 MILLILITER(S): 9 INJECTION INTRAMUSCULAR; INTRAVENOUS; SUBCUTANEOUS at 18:06

## 2018-01-01 RX ADMIN — PIPERACILLIN AND TAZOBACTAM 200 GRAM(S): 4; .5 INJECTION, POWDER, LYOPHILIZED, FOR SOLUTION INTRAVENOUS at 17:30

## 2018-01-01 RX ADMIN — Medication 50 MILLIEQUIVALENT(S): at 14:44

## 2018-01-01 RX ADMIN — CHLORHEXIDINE GLUCONATE 15 MILLILITER(S): 213 SOLUTION TOPICAL at 18:35

## 2018-01-01 RX ADMIN — MIDODRINE HYDROCHLORIDE 10 MILLIGRAM(S): 2.5 TABLET ORAL at 15:49

## 2018-01-01 RX ADMIN — PANTOPRAZOLE SODIUM 40 MILLIGRAM(S): 20 TABLET, DELAYED RELEASE ORAL at 06:37

## 2018-01-01 RX ADMIN — Medication 250 MILLIGRAM(S): at 18:05

## 2018-01-01 RX ADMIN — SODIUM CHLORIDE 1000 MILLILITER(S): 9 INJECTION, SOLUTION INTRAVENOUS at 21:44

## 2018-01-01 RX ADMIN — AZITHROMYCIN 255 MILLIGRAM(S): 500 TABLET, FILM COATED ORAL at 19:13

## 2018-01-01 RX ADMIN — Medication 2.31 MICROGRAM(S)/KG/MIN: at 20:16

## 2018-01-01 RX ADMIN — HEPARIN SODIUM 5000 UNIT(S): 5000 INJECTION INTRAVENOUS; SUBCUTANEOUS at 05:41

## 2018-01-01 RX ADMIN — MIDODRINE HYDROCHLORIDE 10 MILLIGRAM(S): 2.5 TABLET ORAL at 21:21

## 2018-01-01 RX ADMIN — GABAPENTIN 100 MILLIGRAM(S): 400 CAPSULE ORAL at 15:49

## 2018-01-01 RX ADMIN — GABAPENTIN 100 MILLIGRAM(S): 400 CAPSULE ORAL at 05:18

## 2018-01-01 RX ADMIN — Medication 2 MILLIGRAM(S): at 10:40

## 2018-01-01 RX ADMIN — PIPERACILLIN AND TAZOBACTAM 200 GRAM(S): 4; .5 INJECTION, POWDER, LYOPHILIZED, FOR SOLUTION INTRAVENOUS at 18:21

## 2018-01-01 RX ADMIN — PANTOPRAZOLE SODIUM 40 MILLIGRAM(S): 20 TABLET, DELAYED RELEASE ORAL at 06:03

## 2018-01-01 RX ADMIN — PIPERACILLIN AND TAZOBACTAM 200 GRAM(S): 4; .5 INJECTION, POWDER, LYOPHILIZED, FOR SOLUTION INTRAVENOUS at 13:55

## 2018-01-01 RX ADMIN — GABAPENTIN 100 MILLIGRAM(S): 400 CAPSULE ORAL at 15:04

## 2018-01-01 RX ADMIN — SODIUM CHLORIDE 500 MILLILITER(S): 9 INJECTION INTRAMUSCULAR; INTRAVENOUS; SUBCUTANEOUS at 06:04

## 2018-01-01 RX ADMIN — Medication 40 MILLIGRAM(S): at 17:41

## 2018-01-01 RX ADMIN — PANTOPRAZOLE SODIUM 40 MILLIGRAM(S): 20 TABLET, DELAYED RELEASE ORAL at 06:07

## 2018-01-01 RX ADMIN — SODIUM CHLORIDE 75 MILLILITER(S): 9 INJECTION, SOLUTION INTRAVENOUS at 15:48

## 2018-01-01 RX ADMIN — Medication 650 MILLIGRAM(S): at 06:00

## 2018-01-01 RX ADMIN — PIPERACILLIN AND TAZOBACTAM 200 GRAM(S): 4; .5 INJECTION, POWDER, LYOPHILIZED, FOR SOLUTION INTRAVENOUS at 05:02

## 2018-01-01 RX ADMIN — GABAPENTIN 100 MILLIGRAM(S): 400 CAPSULE ORAL at 21:14

## 2018-01-01 RX ADMIN — GABAPENTIN 100 MILLIGRAM(S): 400 CAPSULE ORAL at 14:10

## 2018-01-01 RX ADMIN — FENTANYL CITRATE 0.62 MICROGRAM(S)/KG/HR: 50 INJECTION INTRAVENOUS at 20:00

## 2018-01-01 RX ADMIN — SODIUM CHLORIDE 1000 MILLILITER(S): 9 INJECTION, SOLUTION INTRAVENOUS at 18:22

## 2018-01-01 RX ADMIN — FENTANYL CITRATE 3.08 MICROGRAM(S)/KG/HR: 50 INJECTION INTRAVENOUS at 08:00

## 2018-01-01 RX ADMIN — HEPARIN SODIUM 5000 UNIT(S): 5000 INJECTION INTRAVENOUS; SUBCUTANEOUS at 15:49

## 2018-01-01 RX ADMIN — CHLORHEXIDINE GLUCONATE 15 MILLILITER(S): 213 SOLUTION TOPICAL at 05:31

## 2018-01-01 RX ADMIN — Medication 150 MILLIGRAM(S): at 11:58

## 2018-01-01 RX ADMIN — Medication 150 MILLIGRAM(S): at 12:59

## 2018-01-01 RX ADMIN — HEPARIN SODIUM 5000 UNIT(S): 5000 INJECTION INTRAVENOUS; SUBCUTANEOUS at 05:37

## 2018-01-01 RX ADMIN — CHLORHEXIDINE GLUCONATE 15 MILLILITER(S): 213 SOLUTION TOPICAL at 17:23

## 2018-01-01 RX ADMIN — Medication 2.31 MICROGRAM(S)/KG/MIN: at 09:21

## 2018-01-01 RX ADMIN — Medication 40 MILLIGRAM(S): at 15:07

## 2018-01-01 RX ADMIN — HEPARIN SODIUM 5000 UNIT(S): 5000 INJECTION INTRAVENOUS; SUBCUTANEOUS at 14:45

## 2018-01-01 RX ADMIN — FENTANYL CITRATE 0.62 MICROGRAM(S)/KG/HR: 50 INJECTION INTRAVENOUS at 08:03

## 2018-01-01 RX ADMIN — HEPARIN SODIUM 5000 UNIT(S): 5000 INJECTION INTRAVENOUS; SUBCUTANEOUS at 05:36

## 2018-01-01 RX ADMIN — FENTANYL CITRATE 3.08 MICROGRAM(S)/KG/HR: 50 INJECTION INTRAVENOUS at 09:21

## 2018-01-01 RX ADMIN — CHLORHEXIDINE GLUCONATE 15 MILLILITER(S): 213 SOLUTION TOPICAL at 06:00

## 2018-01-01 RX ADMIN — PANTOPRAZOLE SODIUM 40 MILLIGRAM(S): 20 TABLET, DELAYED RELEASE ORAL at 06:27

## 2018-01-01 RX ADMIN — MIDODRINE HYDROCHLORIDE 10 MILLIGRAM(S): 2.5 TABLET ORAL at 05:56

## 2018-01-01 RX ADMIN — PANTOPRAZOLE SODIUM 40 MILLIGRAM(S): 20 TABLET, DELAYED RELEASE ORAL at 18:31

## 2018-01-01 RX ADMIN — Medication 250 MILLIGRAM(S): at 20:39

## 2018-01-01 RX ADMIN — CHLORHEXIDINE GLUCONATE 15 MILLILITER(S): 213 SOLUTION TOPICAL at 06:21

## 2018-01-01 RX ADMIN — HEPARIN SODIUM 5000 UNIT(S): 5000 INJECTION INTRAVENOUS; SUBCUTANEOUS at 05:57

## 2018-01-01 RX ADMIN — CHLORHEXIDINE GLUCONATE 15 MILLILITER(S): 213 SOLUTION TOPICAL at 05:19

## 2018-01-01 RX ADMIN — GABAPENTIN 100 MILLIGRAM(S): 400 CAPSULE ORAL at 05:03

## 2018-01-01 RX ADMIN — HEPARIN SODIUM 5000 UNIT(S): 5000 INJECTION INTRAVENOUS; SUBCUTANEOUS at 21:21

## 2018-01-01 RX ADMIN — GABAPENTIN 100 MILLIGRAM(S): 400 CAPSULE ORAL at 22:52

## 2018-01-01 RX ADMIN — MORPHINE SULFATE 2 MILLIGRAM(S): 50 CAPSULE, EXTENDED RELEASE ORAL at 17:00

## 2018-01-01 RX ADMIN — Medication 650 MILLIGRAM(S): at 01:22

## 2018-01-01 RX ADMIN — PANTOPRAZOLE SODIUM 40 MILLIGRAM(S): 20 TABLET, DELAYED RELEASE ORAL at 06:20

## 2018-01-01 RX ADMIN — Medication 250 MILLIGRAM(S): at 13:56

## 2018-01-01 RX ADMIN — Medication 2.31 MICROGRAM(S)/KG/MIN: at 08:00

## 2018-01-01 RX ADMIN — CHLORHEXIDINE GLUCONATE 15 MILLILITER(S): 213 SOLUTION TOPICAL at 06:37

## 2018-01-01 RX ADMIN — MIDODRINE HYDROCHLORIDE 10 MILLIGRAM(S): 2.5 TABLET ORAL at 05:08

## 2018-01-01 RX ADMIN — CHLORHEXIDINE GLUCONATE 15 MILLILITER(S): 213 SOLUTION TOPICAL at 06:02

## 2018-01-01 RX ADMIN — GABAPENTIN 100 MILLIGRAM(S): 400 CAPSULE ORAL at 21:30

## 2018-01-01 RX ADMIN — GABAPENTIN 100 MILLIGRAM(S): 400 CAPSULE ORAL at 16:16

## 2018-01-01 RX ADMIN — SODIUM CHLORIDE 75 MILLILITER(S): 9 INJECTION INTRAMUSCULAR; INTRAVENOUS; SUBCUTANEOUS at 20:00

## 2018-01-01 RX ADMIN — PIPERACILLIN AND TAZOBACTAM 200 GRAM(S): 4; .5 INJECTION, POWDER, LYOPHILIZED, FOR SOLUTION INTRAVENOUS at 13:44

## 2018-01-01 RX ADMIN — GABAPENTIN 100 MILLIGRAM(S): 400 CAPSULE ORAL at 05:31

## 2018-01-01 RX ADMIN — GABAPENTIN 100 MILLIGRAM(S): 400 CAPSULE ORAL at 14:45

## 2018-01-01 RX ADMIN — FENTANYL CITRATE 0.62 MICROGRAM(S)/KG/HR: 50 INJECTION INTRAVENOUS at 15:00

## 2018-01-01 RX ADMIN — HEPARIN SODIUM 5000 UNIT(S): 5000 INJECTION INTRAVENOUS; SUBCUTANEOUS at 06:29

## 2018-01-01 RX ADMIN — MIDODRINE HYDROCHLORIDE 10 MILLIGRAM(S): 2.5 TABLET ORAL at 22:48

## 2018-01-01 RX ADMIN — GABAPENTIN 100 MILLIGRAM(S): 400 CAPSULE ORAL at 06:38

## 2018-01-01 RX ADMIN — MIDODRINE HYDROCHLORIDE 10 MILLIGRAM(S): 2.5 TABLET ORAL at 05:36

## 2018-01-01 RX ADMIN — CEFEPIME 100 MILLIGRAM(S): 1 INJECTION, POWDER, FOR SOLUTION INTRAMUSCULAR; INTRAVENOUS at 12:59

## 2018-01-01 RX ADMIN — Medication 80 MILLIGRAM(S): at 18:00

## 2018-01-01 RX ADMIN — MIDODRINE HYDROCHLORIDE 10 MILLIGRAM(S): 2.5 TABLET ORAL at 13:37

## 2018-01-01 RX ADMIN — HEPARIN SODIUM 5000 UNIT(S): 5000 INJECTION INTRAVENOUS; SUBCUTANEOUS at 22:48

## 2018-01-01 RX ADMIN — MIDAZOLAM HYDROCHLORIDE 2 MILLIGRAM(S): 1 INJECTION, SOLUTION INTRAMUSCULAR; INTRAVENOUS at 16:00

## 2018-01-01 RX ADMIN — GABAPENTIN 100 MILLIGRAM(S): 400 CAPSULE ORAL at 06:07

## 2018-01-01 RX ADMIN — Medication 1 MILLIGRAM(S): at 22:03

## 2018-01-01 RX ADMIN — CHLORHEXIDINE GLUCONATE 15 MILLILITER(S): 213 SOLUTION TOPICAL at 18:25

## 2018-01-01 RX ADMIN — Medication 150 MILLIGRAM(S): at 12:13

## 2018-01-01 RX ADMIN — PIPERACILLIN AND TAZOBACTAM 200 GRAM(S): 4; .5 INJECTION, POWDER, LYOPHILIZED, FOR SOLUTION INTRAVENOUS at 12:05

## 2018-01-01 RX ADMIN — SODIUM CHLORIDE 100 MILLILITER(S): 9 INJECTION, SOLUTION INTRAVENOUS at 12:15

## 2018-01-01 RX ADMIN — CHLORHEXIDINE GLUCONATE 15 MILLILITER(S): 213 SOLUTION TOPICAL at 19:07

## 2018-01-01 RX ADMIN — GABAPENTIN 100 MILLIGRAM(S): 400 CAPSULE ORAL at 06:20

## 2018-01-01 RX ADMIN — Medication 150 MILLIGRAM(S): at 12:21

## 2018-01-01 RX ADMIN — MIDODRINE HYDROCHLORIDE 10 MILLIGRAM(S): 2.5 TABLET ORAL at 21:51

## 2018-01-01 RX ADMIN — CEFEPIME 100 MILLIGRAM(S): 1 INJECTION, POWDER, FOR SOLUTION INTRAMUSCULAR; INTRAVENOUS at 11:22

## 2018-01-01 RX ADMIN — GABAPENTIN 100 MILLIGRAM(S): 400 CAPSULE ORAL at 01:43

## 2018-01-01 RX ADMIN — HEPARIN SODIUM 5000 UNIT(S): 5000 INJECTION INTRAVENOUS; SUBCUTANEOUS at 21:14

## 2018-01-01 RX ADMIN — MIDODRINE HYDROCHLORIDE 10 MILLIGRAM(S): 2.5 TABLET ORAL at 05:03

## 2018-01-01 RX ADMIN — CHLORHEXIDINE GLUCONATE 15 MILLILITER(S): 213 SOLUTION TOPICAL at 17:27

## 2018-01-01 RX ADMIN — MIDODRINE HYDROCHLORIDE 10 MILLIGRAM(S): 2.5 TABLET ORAL at 15:25

## 2018-01-01 RX ADMIN — GABAPENTIN 100 MILLIGRAM(S): 400 CAPSULE ORAL at 22:11

## 2018-01-01 RX ADMIN — MORPHINE SULFATE 2 MG/HR: 50 CAPSULE, EXTENDED RELEASE ORAL at 17:38

## 2018-01-01 RX ADMIN — HEPARIN SODIUM 5000 UNIT(S): 5000 INJECTION INTRAVENOUS; SUBCUTANEOUS at 21:46

## 2018-01-01 RX ADMIN — Medication 2.31 MICROGRAM(S)/KG/MIN: at 18:04

## 2018-01-01 RX ADMIN — POLYETHYLENE GLYCOL 3350 17 GRAM(S): 17 POWDER, FOR SOLUTION ORAL at 13:58

## 2018-01-01 RX ADMIN — MIDODRINE HYDROCHLORIDE 10 MILLIGRAM(S): 2.5 TABLET ORAL at 14:17

## 2018-01-01 RX ADMIN — Medication 150 MILLIGRAM(S): at 16:14

## 2018-01-01 RX ADMIN — GABAPENTIN 100 MILLIGRAM(S): 400 CAPSULE ORAL at 05:43

## 2018-01-01 RX ADMIN — HEPARIN SODIUM 5000 UNIT(S): 5000 INJECTION INTRAVENOUS; SUBCUTANEOUS at 05:03

## 2018-01-01 RX ADMIN — CHLORHEXIDINE GLUCONATE 15 MILLILITER(S): 213 SOLUTION TOPICAL at 18:23

## 2018-01-01 RX ADMIN — CHLORHEXIDINE GLUCONATE 15 MILLILITER(S): 213 SOLUTION TOPICAL at 18:03

## 2018-01-01 RX ADMIN — GABAPENTIN 100 MILLIGRAM(S): 400 CAPSULE ORAL at 22:07

## 2018-01-01 RX ADMIN — GABAPENTIN 100 MILLIGRAM(S): 400 CAPSULE ORAL at 21:55

## 2018-01-01 RX ADMIN — PIPERACILLIN AND TAZOBACTAM 200 GRAM(S): 4; .5 INJECTION, POWDER, LYOPHILIZED, FOR SOLUTION INTRAVENOUS at 23:03

## 2018-01-01 RX ADMIN — HEPARIN SODIUM 5000 UNIT(S): 5000 INJECTION INTRAVENOUS; SUBCUTANEOUS at 21:53

## 2018-01-01 RX ADMIN — FENTANYL CITRATE 3.08 MICROGRAM(S)/KG/HR: 50 INJECTION INTRAVENOUS at 20:16

## 2018-01-01 RX ADMIN — Medication 40 MILLIGRAM(S): at 09:35

## 2018-01-01 RX ADMIN — SODIUM CHLORIDE 75 MILLILITER(S): 9 INJECTION, SOLUTION INTRAVENOUS at 02:46

## 2018-01-01 RX ADMIN — CEFEPIME 100 MILLIGRAM(S): 1 INJECTION, POWDER, FOR SOLUTION INTRAMUSCULAR; INTRAVENOUS at 12:29

## 2018-01-01 RX ADMIN — Medication 50 MILLILITER(S): at 09:02

## 2018-01-01 RX ADMIN — Medication 50 GRAM(S): at 13:40

## 2018-01-01 RX ADMIN — MIDODRINE HYDROCHLORIDE 10 MILLIGRAM(S): 2.5 TABLET ORAL at 06:29

## 2018-01-01 RX ADMIN — Medication 250 MILLIGRAM(S): at 11:42

## 2018-01-01 RX ADMIN — CEFEPIME 100 MILLIGRAM(S): 1 INJECTION, POWDER, FOR SOLUTION INTRAMUSCULAR; INTRAVENOUS at 11:46

## 2018-01-01 RX ADMIN — MIDAZOLAM HYDROCHLORIDE 2 MILLIGRAM(S): 1 INJECTION, SOLUTION INTRAMUSCULAR; INTRAVENOUS at 11:43

## 2018-01-01 RX ADMIN — GABAPENTIN 100 MILLIGRAM(S): 400 CAPSULE ORAL at 21:21

## 2018-01-01 RX ADMIN — GABAPENTIN 100 MILLIGRAM(S): 400 CAPSULE ORAL at 21:41

## 2018-01-01 RX ADMIN — SODIUM CHLORIDE 1000 MILLILITER(S): 9 INJECTION, SOLUTION INTRAVENOUS at 17:00

## 2018-01-01 RX ADMIN — GABAPENTIN 100 MILLIGRAM(S): 400 CAPSULE ORAL at 21:46

## 2018-01-01 RX ADMIN — HEPARIN SODIUM 5000 UNIT(S): 5000 INJECTION INTRAVENOUS; SUBCUTANEOUS at 06:07

## 2018-01-01 RX ADMIN — GABAPENTIN 100 MILLIGRAM(S): 400 CAPSULE ORAL at 05:58

## 2018-01-01 RX ADMIN — CEFEPIME 100 MILLIGRAM(S): 1 INJECTION, POWDER, FOR SOLUTION INTRAMUSCULAR; INTRAVENOUS at 12:13

## 2018-01-01 RX ADMIN — PANTOPRAZOLE SODIUM 40 MILLIGRAM(S): 20 TABLET, DELAYED RELEASE ORAL at 06:53

## 2018-01-01 RX ADMIN — GABAPENTIN 100 MILLIGRAM(S): 400 CAPSULE ORAL at 22:48

## 2018-01-01 RX ADMIN — SODIUM CHLORIDE 1000 MILLILITER(S): 9 INJECTION, SOLUTION INTRAVENOUS at 10:00

## 2018-01-01 RX ADMIN — Medication 150 MILLIGRAM(S): at 12:30

## 2018-01-01 RX ADMIN — PANTOPRAZOLE SODIUM 40 MILLIGRAM(S): 20 TABLET, DELAYED RELEASE ORAL at 07:27

## 2018-01-01 RX ADMIN — Medication 650 MILLIGRAM(S): at 15:20

## 2018-01-01 RX ADMIN — SODIUM CHLORIDE 2000 MILLILITER(S): 9 INJECTION, SOLUTION INTRAVENOUS at 08:30

## 2018-01-01 RX ADMIN — MIDODRINE HYDROCHLORIDE 10 MILLIGRAM(S): 2.5 TABLET ORAL at 21:15

## 2018-01-01 RX ADMIN — GABAPENTIN 100 MILLIGRAM(S): 400 CAPSULE ORAL at 21:51

## 2018-01-01 RX ADMIN — ROBINUL 0.4 MILLIGRAM(S): 0.2 INJECTION INTRAMUSCULAR; INTRAVENOUS at 17:34

## 2018-01-01 RX ADMIN — PANTOPRAZOLE SODIUM 40 MILLIGRAM(S): 20 TABLET, DELAYED RELEASE ORAL at 06:29

## 2018-01-01 RX ADMIN — Medication 250 MILLIGRAM(S): at 13:37

## 2018-01-01 RX ADMIN — Medication 650 MILLIGRAM(S): at 18:22

## 2018-01-01 RX ADMIN — MIDODRINE HYDROCHLORIDE 10 MILLIGRAM(S): 2.5 TABLET ORAL at 21:31

## 2018-01-01 RX ADMIN — CHLORHEXIDINE GLUCONATE 15 MILLILITER(S): 213 SOLUTION TOPICAL at 06:29

## 2018-01-01 RX ADMIN — SODIUM CHLORIDE 1000 MILLILITER(S): 9 INJECTION INTRAMUSCULAR; INTRAVENOUS; SUBCUTANEOUS at 06:01

## 2018-01-01 RX ADMIN — Medication 250 MILLIGRAM(S): at 05:08

## 2018-01-01 RX ADMIN — MORPHINE SULFATE 2 MILLIGRAM(S): 50 CAPSULE, EXTENDED RELEASE ORAL at 18:23

## 2018-01-01 RX ADMIN — MIDODRINE HYDROCHLORIDE 10 MILLIGRAM(S): 2.5 TABLET ORAL at 16:11

## 2018-01-01 RX ADMIN — HEPARIN SODIUM 5000 UNIT(S): 5000 INJECTION INTRAVENOUS; SUBCUTANEOUS at 21:50

## 2018-01-01 RX ADMIN — SODIUM CHLORIDE 500 MILLILITER(S): 9 INJECTION, SOLUTION INTRAVENOUS at 12:50

## 2018-01-01 RX ADMIN — FENTANYL CITRATE 3.08 MICROGRAM(S)/KG/HR: 50 INJECTION INTRAVENOUS at 15:00

## 2018-01-01 RX ADMIN — Medication 650 MILLIGRAM(S): at 09:27

## 2018-01-01 RX ADMIN — GABAPENTIN 100 MILLIGRAM(S): 400 CAPSULE ORAL at 06:29

## 2018-01-01 RX ADMIN — FENTANYL CITRATE 25 MICROGRAM(S): 50 INJECTION INTRAVENOUS at 16:13

## 2018-01-01 RX ADMIN — HEPARIN SODIUM 5000 UNIT(S): 5000 INJECTION INTRAVENOUS; SUBCUTANEOUS at 22:52

## 2018-01-01 RX ADMIN — POLYETHYLENE GLYCOL 3350 17 GRAM(S): 17 POWDER, FOR SOLUTION ORAL at 12:02

## 2018-01-01 RX ADMIN — GABAPENTIN 100 MILLIGRAM(S): 400 CAPSULE ORAL at 13:37

## 2018-01-01 RX ADMIN — PIPERACILLIN AND TAZOBACTAM 200 GRAM(S): 4; .5 INJECTION, POWDER, LYOPHILIZED, FOR SOLUTION INTRAVENOUS at 11:42

## 2018-01-01 RX ADMIN — CHLORHEXIDINE GLUCONATE 15 MILLILITER(S): 213 SOLUTION TOPICAL at 06:08

## 2018-01-01 RX ADMIN — Medication 50 MILLIEQUIVALENT(S): at 12:25

## 2018-01-01 RX ADMIN — GABAPENTIN 100 MILLIGRAM(S): 400 CAPSULE ORAL at 05:37

## 2018-01-01 RX ADMIN — PANTOPRAZOLE SODIUM 40 MILLIGRAM(S): 20 TABLET, DELAYED RELEASE ORAL at 09:22

## 2018-01-01 RX ADMIN — LACTULOSE 10 GRAM(S): 10 SOLUTION ORAL at 21:46

## 2018-01-01 RX ADMIN — MIDAZOLAM HYDROCHLORIDE 2 MILLIGRAM(S): 1 INJECTION, SOLUTION INTRAMUSCULAR; INTRAVENOUS at 15:00

## 2018-01-01 RX ADMIN — GABAPENTIN 100 MILLIGRAM(S): 400 CAPSULE ORAL at 15:25

## 2018-01-01 RX ADMIN — MIDODRINE HYDROCHLORIDE 10 MILLIGRAM(S): 2.5 TABLET ORAL at 11:47

## 2018-01-01 RX ADMIN — HEPARIN SODIUM 5000 UNIT(S): 5000 INJECTION INTRAVENOUS; SUBCUTANEOUS at 13:37

## 2018-01-01 RX ADMIN — GABAPENTIN 100 MILLIGRAM(S): 400 CAPSULE ORAL at 23:29

## 2018-01-01 RX ADMIN — SODIUM CHLORIDE 500 MILLILITER(S): 9 INJECTION, SOLUTION INTRAVENOUS at 01:00

## 2018-01-01 RX ADMIN — HEPARIN SODIUM 5000 UNIT(S): 5000 INJECTION INTRAVENOUS; SUBCUTANEOUS at 13:27

## 2018-01-01 RX ADMIN — PIPERACILLIN AND TAZOBACTAM 25 GRAM(S): 4; .5 INJECTION, POWDER, LYOPHILIZED, FOR SOLUTION INTRAVENOUS at 18:06

## 2018-01-01 RX ADMIN — MIDODRINE HYDROCHLORIDE 10 MILLIGRAM(S): 2.5 TABLET ORAL at 22:52

## 2018-01-01 RX ADMIN — PANTOPRAZOLE SODIUM 40 MILLIGRAM(S): 20 TABLET, DELAYED RELEASE ORAL at 06:02

## 2018-01-01 RX ADMIN — PANTOPRAZOLE SODIUM 40 MILLIGRAM(S): 20 TABLET, DELAYED RELEASE ORAL at 12:29

## 2018-01-01 RX ADMIN — MIDODRINE HYDROCHLORIDE 10 MILLIGRAM(S): 2.5 TABLET ORAL at 13:27

## 2018-01-01 RX ADMIN — MIDODRINE HYDROCHLORIDE 10 MILLIGRAM(S): 2.5 TABLET ORAL at 09:22

## 2018-01-01 RX ADMIN — CEFEPIME 100 MILLIGRAM(S): 1 INJECTION, POWDER, FOR SOLUTION INTRAMUSCULAR; INTRAVENOUS at 11:14

## 2018-01-01 RX ADMIN — GABAPENTIN 100 MILLIGRAM(S): 400 CAPSULE ORAL at 05:08

## 2018-01-01 RX ADMIN — GABAPENTIN 100 MILLIGRAM(S): 400 CAPSULE ORAL at 13:27

## 2018-01-01 RX ADMIN — SODIUM CHLORIDE 75 MILLILITER(S): 9 INJECTION INTRAMUSCULAR; INTRAVENOUS; SUBCUTANEOUS at 08:03

## 2018-04-15 NOTE — ED PROVIDER NOTE - NS ED ROS FT
Constitutional:  See HPI.  ENMT:  o neck pain or stiffness.  Cardiac:  No chest pain  Respiratory:  + cough  GI:  +nausea, +vomiting, +diarrhea no abdominal pain.  MS:  No myalgia, muscle weakness, joint pain or back pain.  Skin:  No skin rash. Constitutional:  See HPI.  ENMT:  no neck pain or stiffness.  Cardiac:  No chest pain  Respiratory:  + cough  GI:  +nausea, +vomiting, +diarrhea no abdominal pain.  MS:  No myalgia, muscle weakness, joint pain or back pain.  Skin:  No skin rash.

## 2018-04-15 NOTE — ED PROVIDER NOTE - PLAN OF CARE
Plan: EKG, CXR, labs, imaging, urine, abx, plan for admission, will continue to monitor and reassess.

## 2018-04-15 NOTE — ED ADULT NURSE NOTE - OBJECTIVE STATEMENT
pt presents to the ED sent in from [facility] for sob and pleural infiltrate. PT also has fevers. No n/v/d noted.

## 2018-04-15 NOTE — ED PROVIDER NOTE - MEDICAL DECISION MAKING DETAILS
pt treated for pneumonia, on bipap, icu team aware of pt, admitted to ICU as approved as pt hypoxic off bipap to low 80's improved on bipap. pt reports feeling better since being on bipap, awake and alert, admitted as discussed and agreed with pt.

## 2018-04-15 NOTE — ED PROVIDER NOTE - PROGRESS NOTE DETAILS
hgb 8.8 on 3/16, today 8.9 bun/cre 43/1.8 on 4/14, today 44/1.9. nurse gave ceftrizone instead of the cefepime, so order had to be changed due to adminstration of ceftrizone, pt on bipap improved, cxr noted, pending rest of imaging as pt also with rlq abd pain, willc ontinue to monitor and reassess. nurse gave ceftrizone instead of the cefepime, so order had to be changed due to adminstration of ceftrixone, pt on bipap improved, cxr noted, pending rest of imaging as pt also with rlq abd pain, willc ontinue to monitor and reassess. repeat lactate 2.4 on vbg previous was 2.4. approved ICU by juan. Meme jones. pt on bipap, imaging reviewed, icu aware approved for icu due to hypoxia on ra elavted lactate depsite fluid recitiation pt febrile recived abx, admitted.

## 2018-04-15 NOTE — ED PROVIDER NOTE - PHYSICAL EXAMINATION
CONSTITUTIONAL: WA / WN / NAD  HEAD: NCAT  EYES: PERRL; EOMI  NECK: Supple  CARD: RRR; nl S1/S2; no M/R/G. Pulses equal bilaterally.  RESP: B/L rhonchi   ABD: Soft, NT ND nl bowel sounds; no masses;   MSK/EXT: No gross deformities  SKIN: Warm and dry;   NEURO: AAOx3  PSYCH: Memory Intact, Normal Affect

## 2018-04-15 NOTE — ED PROVIDER NOTE - CARE PLAN
Assessment and plan of treatment:	Plan: EKG, CXR, labs, imaging, urine, abx, plan for admission, will continue to monitor and reassess. Principal Discharge DX:	Pneumonia  Assessment and plan of treatment:	Plan: EKG, CXR, labs, imaging, urine, abx, plan for admission, will continue to monitor and reassess. Principal Discharge DX:	Pneumonia  Assessment and plan of treatment:	Plan: EKG, CXR, labs, imaging, urine, abx, plan for admission, will continue to monitor and reassess.  Secondary Diagnosis:	Severe sepsis  Secondary Diagnosis:	Pleural effusion

## 2018-04-15 NOTE — ED PROVIDER NOTE - ATTENDING CONTRIBUTION TO CARE
I personally evaluated the patient. I reviewed the Resident’s or Physician Assistant’s note (as assigned above), and agree with the findings and plan except as documented in my note.  I was present for and supervised the key/critical aspects of the procedures performed during the care of the patient.  A 69 y/o m w pmhx of copd not on home, htnm hypokalemia, hypomagnesium, colon ca s/p resection presents from [facility] for possible pneumonia. reports productive cough a x2 days, associated w/ sob, vomiting and diarrhea 2 days ago, non-bloody. No fever, chills, current nausea otr vomiting, cp, pleuritic cp, palpitations, diaphoresis,  ha/lh/dizziness, numbness/tingling, neck pain/ stiffness, abd pain, diarrhea, constipation, melena/brbpr, urinary symptoms, trauma, weakness, edema, calf pain/swelling/erythema, sick contacts, recent travel or rash.  On Exam: Vital Signs: I have reviewed the initial vital signs. Constitutional: Pt on nrb speaking full sentences but with increased work of breathing improved on bipap. Integumentary: No rash. ENT: dryMM NECK: Supple, non-tender, no meningeal signs. Cardiovascular: RRR, radial pulses 2/4 b/l. No JVD. Respiratory: BS present b/l, crackles to lower lung bases present, poor resp effort and excursion, poor air exchange,  (+) supraclavicular accessory muscle use, no stridor. Gastrointestinal: BS present throughout all 4 quadrants, soft, nd, tenderness to palpation to RLQ, no rebound tenderness or guarding, no cvat. Musculoskeletal: FROM, 1+ pitting edema, no calf pain/swelling/erythema. Neurologic: AAOx3, motor 5/5 and sensation intact throughout upper and lowe ext, CN II-XII intact, No facial droop or slurring of speech. No focal deficits.

## 2018-04-15 NOTE — ED PROVIDER NOTE - OBJECTIVE STATEMENT
68 year old female with a pmh of copd, htn hypokalemia, hypomagnesium colon ca in the past from [facility] brought in by ambulance for r/o pneumonia. As per ambulance and patient she has been having cough x2 days associated with vomiting and diarrhea. Patient denies fever chills cp or abdominal pain.

## 2018-04-16 NOTE — H&P ADULT - HISTORY OF PRESENT ILLNESS
68 Y F [facility] resident with pmh of anxiety/depression, h/o colon Ca no on current treatment, COPD not on home O2 and htn was brought to the ED to rule out pneumonia. As per the patient she has been having productive cough for 2 days, associated with vomiting and diarrhea. She was sent in because a chest xray done at the NH showed r sided effusions. In the ED Pt was hemodynamically stable but desatting down to 89% on nasal canula. Her cxr showed bilateral pneumonia and she was given cefepime, azithro and vanco. The patient denies abdominal pain or chest pain. 68 Y F [facility] temporary resident for rehab with pmh of anxiety/depression, h/o colon Ca s/p R hemicolectomy not on current treatment, COPD not on home O2 and htn was brought to the ED to rule out pneumonia. As per the patient she has been having productive cough for 2 days, associated with vomiting and diarrhea. She was sent in because a chest xray done at the NH showed r sided effusions. In the ED Pt was hemodynamically stable but desatting down to 89% on nasal canula. She was placed on bipap and is tolerating it well. Her cxr showed bilateral pneumonia and she was given cefepime, azithro and vanco. The patient denies abdominal pain or chest pain. She says her belly is generally distended and her LE edema has improved from before. She has no fevers, sob, chills or soar throat.

## 2018-04-16 NOTE — H&P ADULT - PMH
Anxiety    COPD (chronic obstructive pulmonary disease)    Depression, unspecified depression type    HTN (hypertension)    Malignant neoplasm of colon, unspecified part of colon

## 2018-04-16 NOTE — H&P ADULT - NSHPSOCIALHISTORY_GEN_ALL_CORE
Marital Status:  (   )    (   ) Single    (   )    ( x )   Occupation: retired  Lives with: (  ) alone  (  ) children   (  ) spouse   (  ) parents  (at nursing home ) other    Substance Use (street drugs): (x  ) never used  (  ) other:  Tobacco Usage:  (   ) never smoked   ( x  ) former smoker   (   ) current smoker  (     ) pack year  (        ) last cigarette date  Alcohol Usage: none

## 2018-04-16 NOTE — H&P ADULT - NSHPLABSRESULTS_GEN_ALL_CORE
LABS:                        8.9    12.74 )-----------( 358      ( 15 Apr 2018 18:10 )             27.9     15 Apr 2018 18:10    139    |  98     |  44     ----------------------------<  122    6.0     |  23     |  1.9      Ca    8.5        15 Apr 2018 18:10  Mg     3.1       15 Apr 2018 18:10    TPro  6.2    /  Alb  2.4    /  TBili  0.3    /  DBili  x      /  AST  50     /  ALT  11     /  AlkPhos  199    15 Apr 2018 18:10    Blood Gas Venous - Hemoglobin/Hematocrit (04.15.18 @ 21:27)    Total Hemoglobin, Calculated: 8.4 g/dL    Hematocrit, Calculated: 25.7 %  Troponin T, Serum (04.15.18 @ 18:10)    Troponin T, Serum: 0.01 ng/mL

## 2018-04-16 NOTE — H&P ADULT - ASSESSMENT
68 Y F [facility] resident with pmh of anxiety/depression, h/o colon Ca no on current treatment, COPD not on home O2 and htn was brought to the ED to rule out pneumonia.    Cough 2/2 bilateral pnemonia  -Admit to MICU  -Start   -pan culture  -monitor BP for septic shock    MAGDY on ckd  -likely prerenal  -Start IV hydration at 50cc/hr. Hold lasix for now    Anxiety/depression  -c/w mirtazapine    DVT ppx  hep sub q    Gi ppx  -c/w protonix    Dispo: pt is from [facility] 68 Y F [facility] resident with pmh of anxiety/depression, h/o colon Ca no on current treatment, COPD not on home O2 and htn was brought to the ED to rule out pneumonia.    Cough 2/2 bilateral pnemonia  -Admit to MICU  -Start vanco, cefepime and levofloxacin     -pan culture  -monitor BP for septic shock  -check lactic acid    Vomiting and diarrhea   -resolved  -likely 2/2 pna  -monitor electrolytes    MAGDY on ckd  -likely prerenal  -Start IV hydration at 50cc/hr. Hold lasix for now    Anxiety/depression  -c/w mirtazapine    DVT ppx  hep sub q    Gi ppx  -c/w protonix    Dispo: pt is from [facility] 68 Y F [facility] resident with pmh of anxiety/depression, h/o colon Ca no on current treatment, COPD not on home O2 and htn was brought to the ED to rule out pneumonia.    Cough 2/2 bilateral pnemonia  -Admit to MICU  -Start vanco, cefepime and levofloxacin     -pan culture  -monitor BP for septic shock  -check lactic acid  -MRSA nasal swab  -can do flu swab when bipap is safe to take off but pt had no myalgias, rhinitis or fevers  -echo    Vomiting and diarrhea   -resolved  -likely 2/2 pna  -monitor electrolytes    MAGDY on ckd  -likely prerenal  -Start IV hydration at 50cc/hr. Hold lasix for now    Anxiety/depression  -c/w mirtazapine    DVT ppx  hep sub q    Gi ppx  -c/w protonix    Dispo: pt is from [facility] 68 Y F [facility] resident with pmh of anxiety/depression, h/o colon Ca no on current treatment, COPD not on home O2 and htn was brought to the ED to rule out pneumonia.    Cough 2/2 bilateral pnemonia  -Admit to MICU  -Start vanco, cefepime and levofloxacin     -pan culture  -monitor BP for septic shock  -check lactic acid  -MRSA nasal swab  -can do flu swab when bipap is safe to take off but pt had no myalgias, rhinitis or fevers  -echo    Vomiting and diarrhea   -resolved  -likely 2/2 pna  -monitor electrolytes    MAGDY on ckd  -likely prerenal  -Start IV hydration at 50cc/hr. Hold lasix for now    h/o colon ca with seeding of peritoneum on CT scan  -Hemeonc consult when pt is more stable     Anxiety/depression  -c/w mirtazapine    DVT ppx  hep sub q    Gi ppx  -c/w protonix    Dispo: pt is full code from home currently undergoing rehab in [facility] for 1 month

## 2018-04-16 NOTE — PROGRESS NOTE ADULT - ASSESSMENT
IMPRESSION:    AHRF   HCAP  Pulmonary edema   Bilateral pleural effusions.  R > L.  RO parapneumonic effusions     PLAN:    CNS: kept sedated while ventilated. SV in AM    HEENT: Oral care    PULMONARY:  HOB @ 45 degrees.     CARDIOVASCULAR: I=O.     GI: GI prophylaxis. OG Feeding    RENAL:  Follow up lytes.  Correct as needed    INFECTIOUS DISEASE: Follow up cultures.  Urine Legionella and Strep    HEMATOLOGICAL:  DVT prophylaxis.    ENDOCRINE:  Follow up FS.  Insulin protocol if needed.    Prognosis guarded

## 2018-04-16 NOTE — PROGRESS NOTE ADULT - SUBJECTIVE AND OBJECTIVE BOX
Patient is a 68y old Female who presents with a chief complaint of sob, temperature, dec. BP (16 Apr 2018 05:53)    Currently admitted to medicine with the primary diagnosis of Pneumonia    Today is hospital day .    BRIEF HOSPITAL COURSE:     68 Y F [facility] temporary resident for rehab with PMH of anxiety/depression, h/o colon Ca s/p R hemicolectomy not on current treatment, COPD not on home O2 and HTN was brought to the ED to r/o PNA.     As per the patient she has been having productive cough for 2 days, associated with vomiting and diarrhea. She was sent in because a chest xray done at the NH showed r sided effusions.     In the ED Pt was hemodynamically stable but desating down to 89% on nasal canula. She was placed on bipap and is tolerating it well. Her CXR showed bilateral pneumonia and she was given cefepime, azithro and vanco. The patient denies abdominal pain or chest pain. She says her belly is generally distended and her LE edema has improved from before.     EVENTS LAST 24HRS:     This morning on rounds the patient was still consistently desaturating and requiring NIV. Anesthesia was called and she was intubated. Thoracentesis was preformed and one liter of fluid was drained from the right pleural cavity.    PAST MEDICAL & SURGICAL HISTORY  Depression, unspecified depression type  Anxiety  Malignant neoplasm of colon, unspecified part of colon  HTN (hypertension)  COPD (chronic obstructive pulmonary disease)    SOCIAL HISTORY:  former smoker, no drugs, no etoh    REVIEW OF SYSTEMS:  See HPI    ALLERGIES:  NKDA    MEDICATIONS:  STANDING MEDICATIONS  cefepime  IVPB 1000 milliGRAM(s) IV Intermittent daily  chlorhexidine 0.12% Liquid 15 milliLiter(s) Swish and Spit two times a day  fentaNYL   Infusion 0.5 MICROgram(s)/kG/Hr IV Continuous <Continuous>  gabapentin 100 milliGRAM(s) Oral three times a day  lactated ringers Bolus 500 milliLiter(s) IV Bolus once  lactated ringers Bolus 500 milliLiter(s) IV Bolus once  levoFLOXacin IVPB 500 milliGRAM(s) IV Intermittent every 24 hours  norepinephrine Infusion 0.02 MICROgram(s)/kG/Min IV Continuous <Continuous>  pantoprazole   Suspension 40 milliGRAM(s) Oral before breakfast  vancomycin  IVPB 750 milliGRAM(s) IV Intermittent daily    PRN MEDICATIONS  acetaminophen   Tablet 650 milliGRAM(s) Oral every 6 hours PRN    VITALS:     Mode: AC/ CMV (Assist Control/ Continuous Mandatory Ventilation)  RR (machine): 16  TV (machine): 400  FiO2: 80  PEEP: 10  ITime: 1  MAP: 16  PIP: 38    ICU Vital Signs Last 24 Hrs:    T(C): 38.6 (16 Apr 2018 16:00), Max: 38.6 (16 Apr 2018 16:00)  T(F): 101.5 (16 Apr 2018 16:00), Max: 101.5 (16 Apr 2018 16:00)  HR: 102 (16 Apr 2018 20:15) (82 - 118)  BP: 87/63 (16 Apr 2018 20:15) (69/48 - 119/63)  BP(mean): 70 (16 Apr 2018 20:15) (53 - 87)  ABP: --  ABP(mean): --  RR: 18 (16 Apr 2018 20:15) (15 - 62)  SpO2: 100% (16 Apr 2018 20:15) (82% - 100%)    ABG - ( 16 Apr 2018 16:23 )  pH: 7.46  /  pCO2: 38    /  pO2: 87    / HCO3: 27    / Base Excess: 3.3   /  SaO2: 98        I&O's Detail:    15 Apr 2018 07:01  -  16 Apr 2018 07:00  --------------------------------------------------------  IN:    IV PiggyBack: 100 mL  Total IN: 100 mL    OUT:    Voided: 100 mL  Total OUT: 100 mL    Total NET: 0 mL    16 Apr 2018 07:01  -  16 Apr 2018 20:48  --------------------------------------------------------  IN:    fentaNYL  Infusion: 24.8 mL    lactated ringers.: 1000 mL  Total IN: 1024.8 mL    OUT:    Drain: 1000 mL    Indwelling Catheter - Urethral: 275 mL    Voided: 100 mL  Total OUT: 1375 mL    Total NET: -350.2 mL    LABS:                  8.1    13.47 )-----------( 324      ( 16 Apr 2018 04:22 )             24.8     04-16    144  |  105  |  40<H>  ----------------------------<  117<H>  5.4<H>   |  25  |  1.4    Ca    8.2<L>      16 Apr 2018 04:22  Phos  4.4     04-16  Mg     2.7     04-16    TPro  5.6<L>  /  Alb  2.2<L>  /  TBili  0.2  /  DBili  <0.2  /  AST  44<H>  /  ALT  10  /  AlkPhos  177<H>  04-16    CARDIAC MARKERS ( 16 Apr 2018 11:51 )  x     / <0.01 ng/mL / 56 U/L / x     / 1.7 ng/mL  CARDIAC MARKERS ( 15 Apr 2018 18:10 )  x     / 0.01 ng/mL / 50 U/L / x     / 1.5 ng/mL    CAPILLARY BLOOD GLUCOSE    PT/INR - ( 16 Apr 2018 04:22 )   PT: 15.70 sec;   INR: 1.44 ratio      PTT - ( 16 Apr 2018 04:22 )  PTT:33.6 sec  Urinalysis Basic - ( 16 Apr 2018 04:19 )    Color: Yellow / Appearance: Clear / SG: >=1.030 / pH: x  Gluc: x / Ketone: Negative  / Bili: Negative / Urobili: 0.2   Blood: x / Protein: Trace / Nitrite: Negative   Leuk Esterase: Negative / RBC: x / WBC x   Sq Epi: x / Non Sq Epi: Occasional /HPF / Bacteria: Few /HPF    PHYSICAL EXAM:    General: No acute distress.  Intubated and Sedated.    HEENT: Pupils equal and symmetrically reactive to light.    PULM: scattered rhonchi    CVS: Regular rate and rhythm, no murmurs, rubs, or gallops.    ABD: Soft, nondistended, no masses.    EXT: No edema.    SKIN: Warm and well perfused, no rashes.

## 2018-04-16 NOTE — H&P ADULT - NSHPPHYSICALEXAM_GEN_ALL_CORE
T(F): 98 (04-15-18 @ 23:01), Max: 101.8 (04-15-18 @ 18:05)  HR: 89 (04-15-18 @ 23:31) (82 - 93)  BP: 100/60 (04-15-18 @ 23:31) (87/52 - 120/57)  RR: 22 (04-15-18 @ 23:31) (20 - 22)  SpO2: 96% (04-15-18 @ 23:31) (89% - 98%)  PHYSICAL EXAM:  GENERAL: NAD, speaks in full sentences, no signs of respiratory distress  HEAD:  Atraumatic, Normocephalic  EYES: EOMI, PERRLA, conjunctiva and sclera clear  NECK: Supple, No JVD  CHEST/LUNG: Clear to auscultation bilaterally; No wheeze; No crackles; No accessory muscles used  HEART: Regular rate and rhythm; No murmurs;   ABDOMEN: Soft, Nontender, Nondistended; Bowel sounds present; No guarding  EXTREMITIES:  2+ Peripheral Pulses, No cyanosis or edema  PSYCH: AAOx3  NEUROLOGY: non-focal  SKIN: No rashes or lesions T(F): 98 (04-15-18 @ 23:01), Max: 101.8 (04-15-18 @ 18:05)  HR: 89 (04-15-18 @ 23:31) (82 - 93)  BP: 100/60 (04-15-18 @ 23:31) (87/52 - 120/57)  RR: 22 (04-15-18 @ 23:31) (20 - 22)  SpO2: 96% (04-15-18 @ 23:31) (89% - 98%)  PHYSICAL EXAM:  GENERAL: NAD, speaks in full sentences, no signs of respiratory distress  HEAD:  Atraumatic, Normocephalic  EYES: EOMI, PERRLA, conjunctiva and sclera clear  NECK: Supple, No JVD  CHEST/LUNG: mild wheezing bilaterally   HEART: Regular rhythm with tachycardia; No murmurs;   ABDOMEN: Soft, Nontender, distended; No guarding  EXTREMITIES:  2+ Peripheral Pulses, 1+ edema in LE  PSYCH: AAOx3  NEUROLOGY: non-focal  SKIN: No rashes or lesions

## 2018-04-17 NOTE — PROGRESS NOTE ADULT - ASSESSMENT
IMPRESSION:    AHRF   HCAP  Pulmonary edema   Bilateral pleural effusions.  R > L.  RO parapneumonic effusions     PLAN:    CNS: Alert and follows commands.     HEENT: Oral care    PULMONARY:  HOB @ 45 degrees. O2 40%. Will attempt SBT.     CARDIOVASCULAR: I=O.     GI: GI prophylaxis. OG Feeding      RENAL:  Follow up lytes.  Correct as needed    INFECTIOUS DISEASE: Follow up cultures.     HEMATOLOGICAL:  DVT prophylaxis.    ENDOCRINE:  Follow up FS.  Insulin protocol if needed.    Prognosis guarded

## 2018-04-17 NOTE — DIETITIAN INITIAL EVALUATION ADULT. - PHYSICAL APPEARANCE
BMI: unable to determine at this time d/t lack of ht data. Physical appearance: intubated. 2+ pitting edema (B/L leg). GI: Abd firm, distended. Date of last BM unknown. OG tube. Ecchymotic skin.

## 2018-04-17 NOTE — DIETITIAN INITIAL EVALUATION ADULT. - DIET TYPE
Osmolite 1.5 goal rate 45 mL/hr. Regimen to provide 1620 kcal, 67 g, 821 mL free H2O. Ordered 4/16; goal rate achieved today./NPO with tube feedings

## 2018-04-17 NOTE — PROGRESS NOTE ADULT - SUBJECTIVE AND OBJECTIVE BOX
Over Night Events: On MV.  On Levo 0.06.  SP right thorac        ROS:  See HPI    PHYSICAL EXAM    ICU Vital Signs Last 24 Hrs  T(C): 36.2 (17 Apr 2018 08:00), Max: 38.6 (16 Apr 2018 16:00)  T(F): 97.1 (17 Apr 2018 08:00), Max: 101.5 (16 Apr 2018 16:00)  HR: 86 (17 Apr 2018 08:00) (78 - 118)  BP: 72/50 (17 Apr 2018 08:00) (69/48 - 119/63)  BP(mean): 59 (17 Apr 2018 08:00) (53 - 100)  ABP: --  ABP(mean): --  RR: 19 (17 Apr 2018 08:00) (15 - 37)  SpO2: 100% (17 Apr 2018 08:00) (88% - 100%)      General: Sedation vacation.  Follows commands   HEENT: + ET           Lymph Nodes: No cervical LN   Lungs: Bilateral BS  Cardiovascular: Regular   Abdomen: Soft, Positive BS  Extremities: No clubbing   Skin: Warm  Neurological: Non focal       04-16-18 @ 07:01  -  04-17-18 @ 07:00  --------------------------------------------------------  IN:    Enteral Tube Flush: 60 mL    fentaNYL  Infusion: 43.9 mL    fentaNYL  Infusion: 24.8 mL    IV PiggyBack: 100 mL    Lactated Ringers IV Bolus: 500 mL    lactated ringers.: 1000 mL    norepinephrine Infusion: 252.1 mL    Osmolite: 450 mL  Total IN: 2430.8 mL    OUT:    Drain: 1000 mL    Indwelling Catheter - Urethral: 440 mL    Voided: 100 mL  Total OUT: 1540 mL    Total NET: 890.8 mL          LABS:                            8.1    14.67 )-----------( 311      ( 17 Apr 2018 04:44 )             25.2                                               04-17    139  |  102  |  40<H>  ----------------------------<  111<H>  5.2<H>   |  23  |  1.4    Ca    7.9<L>      17 Apr 2018 04:44  Phos  4.4     04-16  Mg     2.7     04-16    TPro  5.6<L>  /  Alb  2.2<L>  /  TBili  0.2  /  DBili  <0.2  /  AST  44<H>  /  ALT  10  /  AlkPhos  177<H>  04-16      PT/INR - ( 16 Apr 2018 04:22 )   PT: 15.70 sec;   INR: 1.44 ratio         PTT - ( 16 Apr 2018 04:22 )  PTT:33.6 sec                                       Urinalysis Basic - ( 16 Apr 2018 04:19 )    Color: Yellow / Appearance: Clear / SG: >=1.030 / pH: x  Gluc: x / Ketone: Negative  / Bili: Negative / Urobili: 0.2   Blood: x / Protein: Trace / Nitrite: Negative   Leuk Esterase: Negative / RBC: x / WBC x   Sq Epi: x / Non Sq Epi: Occasional /HPF / Bacteria: Few /HPF        CARDIAC MARKERS ( 16 Apr 2018 11:51 )  x     / <0.01 ng/mL / 56 U/L / x     / 1.7 ng/mL  CARDIAC MARKERS ( 15 Apr 2018 18:10 )  x     / 0.01 ng/mL / 50 U/L / x     / 1.5 ng/mL                                            LIVER FUNCTIONS - ( 16 Apr 2018 04:22 )  Alb: 2.2 g/dL / Pro: 5.6 g/dL / ALK PHOS: 177 U/L / ALT: 10 U/L / AST: 44 U/L / GGT: x                                                  Culture - Body Fluid with Gram Stain (collected 16 Apr 2018 17:03)  Source: .Body Fluid Pleural Fluid  Gram Stain (17 Apr 2018 04:35):    polymorphonuclear leukocytes seen per low power field    No organisms seen per oil power field    by cytocentrifuge    Culture - Blood (collected 15 Apr 2018 21:11)  Source: .Blood Blood-Peripheral  Preliminary Report (17 Apr 2018 03:01):    No growth to date.                                                   Mode: AC/ CMV (Assist Control/ Continuous Mandatory Ventilation)  RR (machine): 16  TV (machine): 400  FiO2: 60  PEEP: 10  ITime: 1  MAP: 16  PIP: 31                                      ABG - ( 17 Apr 2018 07:39 )  pH: 7.40  /  pCO2: 43    /  pO2: 253   / HCO3: 27    / Base Excess: 1.8   /  SaO2: 100                 MEDICATIONS  (STANDING):  cefepime  IVPB 1000 milliGRAM(s) IV Intermittent daily  chlorhexidine 0.12% Liquid 15 milliLiter(s) Swish and Spit two times a day  fentaNYL   Infusion 0.5 MICROgram(s)/kG/Hr (3.08 mL/Hr) IV Continuous <Continuous>  gabapentin 100 milliGRAM(s) Oral three times a day  levoFLOXacin IVPB 500 milliGRAM(s) IV Intermittent every 24 hours  norepinephrine Infusion 0.02 MICROgram(s)/kG/Min (2.31 mL/Hr) IV Continuous <Continuous>  pantoprazole   Suspension 40 milliGRAM(s) Oral before breakfast  vancomycin  IVPB 750 milliGRAM(s) IV Intermittent daily    MEDICATIONS  (PRN):  acetaminophen   Tablet 650 milliGRAM(s) Oral every 6 hours PRN For Temp greater than 38 C (100.4 F)      Xrays:    ET OG OK.  Improved infiltrate and effusion on the right side                                                                                ECHO

## 2018-04-17 NOTE — DIETITIAN INITIAL EVALUATION ADULT. - MD RECOMMEND
Recommendation: 1.) Order Prostat q24hrs 2.) Continue providing Osmolite 1.5 at goal rate of 45 mL/hr as tolerated. Monitor Mg/PO4/K. With addition of Prostat, EN regimen at goal to provide 1720 kcal, 82 g protein,, 821 mL free H2O. Flushes per LIP.

## 2018-04-17 NOTE — DIETITIAN INITIAL EVALUATION ADULT. - ENERGY NEEDS
Energy: 2238-0476 kcal/day (25-30 kcal/kg ABW)    Protein: 75-93 g/day (1.2-1.5 g/kg ABW)    Fluids: per ICU team

## 2018-04-17 NOTE — PROGRESS NOTE ADULT - ASSESSMENT
IMPRESSION:    AHRF   HCAP  Pulmonary edema   Bilateral pleural effusions.  R > L.  RO parapneumonic effusions     PLAN:    CNS: SAT     HEENT: Oral care    PULMONARY:  HOB @ 45 degrees. Wean O2. Bronchoscopy today     CARDIOVASCULAR: I=O.     GI: GI prophylaxis. OG Feeding      RENAL:  Follow up lytes.  Correct as needed    INFECTIOUS DISEASE: Follow up cultures.  Urine Legionella and Strep. Continue ABX.  Vanc trough after 3rd dose     HEMATOLOGICAL:  DVT prophylaxis.    ENDOCRINE:  Follow up FS.  Insulin protocol if needed.    MUSCULOSKELETAL:    Prognosis guarded

## 2018-04-17 NOTE — DIETITIAN INITIAL EVALUATION ADULT. - OTHER INFO
Reason for RD assessment: New EN initiated. Pertinent Medical Information: p/w SOB; admitted with PNA. Yesterday morning on rounds the patient was still consistently desaturating and requiring NIV. Anesthesia was called and she was intubated. Thoracentesis was preformed and one liter of fluid was drained from the right pleural cavity. Pulmonary edema noted. AHRF noted - intubated at this time.

## 2018-04-17 NOTE — DIETITIAN INITIAL EVALUATION ADULT. - NS FNS WEIGHT USED FOR CALC
current/Current wt: 62.4 kg (4/17 actual wt). Ht unavailable at this time. Unable to calculate IBW at this time.

## 2018-04-17 NOTE — PROGRESS NOTE ADULT - SUBJECTIVE AND OBJECTIVE BOX
Patient is a 68y old Female who presents with a chief complaint of sob, temperature, dec. BP (16 Apr 2018 05:53)    Currently admitted to medicine with the primary diagnosis of Pneumonia    Today is hospital day .    BRIEF HOSPITAL COURSE:     68 Y F [facility] temporary resident for rehab with PMH of anxiety/depression, h/o colon Ca s/p R hemicolectomy not on current treatment, COPD not on home O2 and HTN was brought to the ED to r/o PNA.     As per the patient she has been having productive cough for 2 days, associated with vomiting and diarrhea. She was sent in because a chest xray done at the NH showed r sided effusions.     In the ED Pt was hemodynamically stable but desating down to 89% on nasal canula. She was placed on bipap and is tolerating it well. Her CXR showed bilateral pneumonia and she was given cefepime, azithro and vanco. The patient denies abdominal pain or chest pain. She says her belly is generally distended and her LE edema has improved from before.     Day 1 ICU on rounds the patient was still consistently desaturating and requiring NIV. Anesthesia was called and she was intubated. Thoracentesis was preformed and one liter of fluid was drained from the right pleural cavity.    Day 2 ICU the patient was much more and alert on sedation vacation. Levophed was weened to very low dose. Bronchoscopy was preformed. SBT will be preformed and extubation attempted in PM.       PAST MEDICAL & SURGICAL HISTORY  Depression, unspecified depression type  Anxiety  Malignant neoplasm of colon, unspecified part of colon  HTN (hypertension)  COPD (chronic obstructive pulmonary disease)    SOCIAL HISTORY:  former smoker, no drugs, no etoh    REVIEW OF SYSTEMS:  See HPI    ALLERGIES:  NKDA    MEDICATIONS:  STANDING MEDICATIONS  cefepime  IVPB 1000 milliGRAM(s) IV Intermittent daily  chlorhexidine 0.12% Liquid 15 milliLiter(s) Swish and Spit two times a day  fentaNYL   Infusion 0.5 MICROgram(s)/kG/Hr IV Continuous <Continuous>  gabapentin 100 milliGRAM(s) Oral three times a day  levoFLOXacin IVPB 500 milliGRAM(s) IV Intermittent every 24 hours  norepinephrine Infusion 0.02 MICROgram(s)/kG/Min IV Continuous <Continuous>  pantoprazole   Suspension 40 milliGRAM(s) Oral before breakfast  vancomycin  IVPB 750 milliGRAM(s) IV Intermittent daily    PRN MEDICATIONS  acetaminophen   Tablet 650 milliGRAM(s) Oral every 6 hours PRN    VITALS:     Mode: CPAP with PS  FiO2: 40  PEEP: 5  PS: 10  MAP: 9  PIP: 16      ICU Vital Signs Last 24 Hrs:    T(C): 36.2 (17 Apr 2018 16:00), Max: 38.1 (16 Apr 2018 20:00)  T(F): 97.1 (17 Apr 2018 16:00), Max: 100.6 (16 Apr 2018 20:00)  HR: 108 (17 Apr 2018 18:30) (78 - 118)  BP: 85/59 (17 Apr 2018 18:30) (69/48 - 115/83)  BP(mean): 65 (17 Apr 2018 18:30) (53 - 100)  ABP: --  ABP(mean): --  RR: 28 (17 Apr 2018 18:30) (15 - 46)  SpO2: 97% (17 Apr 2018 18:30) (95% - 100%)      ABG - ( 17 Apr 2018 18:08 )  pH: 7.41  /  pCO2: 41    /  pO2: 57    / HCO3: 26    / Base Excess: 1.0   /  SaO2: 90                  I&O's Detail:      16 Apr 2018 07:01  -  17 Apr 2018 07:00  --------------------------------------------------------  IN:    Enteral Tube Flush: 60 mL    fentaNYL  Infusion: 43.9 mL    fentaNYL  Infusion: 24.8 mL    IV PiggyBack: 100 mL    Lactated Ringers IV Bolus: 500 mL    lactated ringers.: 1000 mL    norepinephrine Infusion: 252.1 mL    Osmolite: 450 mL  Total IN: 2430.8 mL    OUT:    Drain: 1000 mL    Indwelling Catheter - Urethral: 440 mL    Voided: 100 mL  Total OUT: 1540 mL    Total NET: 890.8 mL      17 Apr 2018 07:01  -  17 Apr 2018 18:45  --------------------------------------------------------  IN:    Enteral Tube Flush: 50 mL    IV PiggyBack: 350 mL    norepinephrine Infusion: 64.8 mL    Osmolite: 495 mL  Total IN: 959.8 mL    OUT:    Indwelling Catheter - Urethral: 135 mL  Total OUT: 135 mL    Total NET: 824.8 mL    LABS:                        8.1    14.67 )-----------( 311      ( 17 Apr 2018 04:44 )             25.2     04-17    139  |  102  |  40<H>  ----------------------------<  111<H>  5.2<H>   |  23  |  1.4    Ca    7.9<L>      17 Apr 2018 04:44  Phos  4.4     04-16  Mg     2.7     04-16    TPro  5.6<L>  /  Alb  2.2<L>  /  TBili  0.2  /  DBili  <0.2  /  AST  44<H>  /  ALT  10  /  AlkPhos  177<H>  04-16      CARDIAC MARKERS ( 16 Apr 2018 11:51 )  x     / <0.01 ng/mL / 56 U/L / x     / 1.7 ng/mL      PT/INR - ( 16 Apr 2018 04:22 )   PT: 15.70 sec;   INR: 1.44 ratio      PTT - ( 16 Apr 2018 04:22 )  PTT:33.6 sec  Urinalysis Basic - ( 16 Apr 2018 04:19 )    Color: Yellow / Appearance: Clear / SG: >=1.030 / pH: x  Gluc: x / Ketone: Negative  / Bili: Negative / Urobili: 0.2   Blood: x / Protein: Trace / Nitrite: Negative   Leuk Esterase: Negative / RBC: x / WBC x   Sq Epi: x / Non Sq Epi: Occasional /HPF / Bacteria: Few /HPF      CULTURES:  Culture Results:   No growth (04-16 @ 04:19)  Culture Results:   No growth to date. (04-15 @ 21:11)      PHYSICAL EXAM:    General: No acute distress.  Alert, oriented, interactive, nonfocal.    HEENT: Pupils equal, reactive to light symmetrically.    PULM: Clear to auscultation bilaterally, no significant sputum production.    CVS: Regular rate and rhythm, no murmurs, rubs, or gallops.    ABD: Soft, nondistended, nontender, no masses.    EXT: No edema, nontender.    SKIN: Warm and well perfused, no rashes noted.    RADIOLOGY:

## 2018-04-18 NOTE — PROGRESS NOTE ADULT - ASSESSMENT
IMPRESSION:    AHRF   HCAP  Pulmonary edema   Bilateral pleural effusions.  R > L.  RO parapneumonic effusions     PLAN:    CNS: SAT     HEENT: Oral care    PULMONARY:  HOB @ 45 degrees. Wean O2. SBT    CARDIOVASCULAR: I<O. Lasix 40 mg X 1    GI: GI prophylaxis. Hold Feeding  for SBT    RENAL:  Follow up lytes.  Correct as needed    INFECTIOUS DISEASE: Follow up cultures. Continue ABX.  DC Vanc     HEMATOLOGICAL:  DVT prophylaxis.    ENDOCRINE:  Follow up FS.  Insulin protocol if needed.

## 2018-04-18 NOTE — PROGRESS NOTE ADULT - ASSESSMENT
IMPRESSION:    AHRF   HCAP  Pulmonary edema   Bilateral pleural effusions.  R > L.  RO parapneumonic effusions     PLAN:    CNS: SAT     HEENT: Oral care    PULMONARY:  HOB @ 45 degrees. Wean O2. SBT    CARDIOVASCULAR: I<O. Lasix 40 mg X 1    GI: GI prophylaxis. Hold Feeding  for SBT    RENAL:  Follow up lytes.  Correct as needed    INFECTIOUS DISEASE: Follow up cultures.  FU Urine Legionella and Strep. Continue ABX.  DC Vanc     HEMATOLOGICAL:  DVT prophylaxis.    ENDOCRINE:  Follow up FS.  Insulin protocol if needed.    MUSCULOSKELETAL:

## 2018-04-18 NOTE — PROGRESS NOTE ADULT - SUBJECTIVE AND OBJECTIVE BOX
Patient is a 68y old Female who presents with a chief complaint of sob, temperature, dec. BP (16 Apr 2018 05:53)    Currently admitted to medicine with the primary diagnosis of Pneumonia    Today is hospital day .    BRIEF HOSPITAL COURSE:     68 Y F [facility] temporary resident for rehab with PMH of anxiety/depression, h/o colon Ca s/p R hemicolectomy not on current treatment, COPD not on home O2 and HTN was brought to the ED to r/o PNA.     As per the patient she has been having productive cough for 2 days, associated with vomiting and diarrhea. She was sent in because a chest xray done at the NH showed r sided effusions.     In the ED Pt was hemodynamically stable but desating down to 89% on nasal canula. She was placed on bipap and is tolerating it well. Her CXR showed bilateral pneumonia and she was given cefepime, azithro and vanco. The patient denies abdominal pain or chest pain. She says her belly is generally distended and her LE edema has improved from before.     Day 1 ICU on rounds the patient was still consistently desaturating and requiring NIV. Anesthesia was called and she was intubated. Thoracentesis was preformed and one liter of fluid was drained from the right pleural cavity.    Day 2 ICU the patient was much more and alert on sedation vacation. Levophed was weened to very low dose. Bronchoscopy was preformed. SBT failed attemp in PM.    Day 3 ICU patient still alert and awake passed SBT today and was extubated at 11am. Passed bedside s&s.     PAST MEDICAL & SURGICAL HISTORY  Depression, unspecified depression type  Anxiety  Malignant neoplasm of colon, unspecified part of colon  HTN (hypertension)  COPD (chronic obstructive pulmonary disease)    SOCIAL HISTORY:  former smoker, no drugs, no etoh    REVIEW OF SYSTEMS:  See HPI    ALLERGIES:  NKDA    MEDICATIONS:  STANDING MEDICATIONS  cefepime  IVPB 1000 milliGRAM(s) IV Intermittent daily  chlorhexidine 0.12% Liquid 15 milliLiter(s) Swish and Spit two times a day  fentaNYL   Infusion 0.5 MICROgram(s)/kG/Hr IV Continuous <Continuous>  gabapentin 100 milliGRAM(s) Oral three times a day  levoFLOXacin IVPB 500 milliGRAM(s) IV Intermittent every 24 hours  norepinephrine Infusion 0.02 MICROgram(s)/kG/Min IV Continuous <Continuous>  pantoprazole   Suspension 40 milliGRAM(s) Oral before breakfast  vancomycin  IVPB 750 milliGRAM(s) IV Intermittent daily    PRN MEDICATIONS  acetaminophen  Suppository 650 milliGRAM(s) Rectal every 6 hours PRN    VITALS:     Mode: CPAP with PS  FiO2: 30  PEEP: 8  PS: 6      ICU Vital Signs Last 24 Hrs:    T(C): 38 (18 Apr 2018 15:00), Max: 38.1 (17 Apr 2018 22:00)  T(F): 100.4 (18 Apr 2018 15:00), Max: 100.6 (17 Apr 2018 22:00)  HR: 120 (18 Apr 2018 17:00) (92 - 136)  BP: 87/52 (18 Apr 2018 17:00) (79/57 - 132/85)  BP(mean): 60 (18 Apr 2018 17:00) (59 - 97)  ABP: --  ABP(mean): --  RR: 50 (18 Apr 2018 17:00) (15 - 53)  SpO2: 97% (18 Apr 2018 17:00) (82% - 100%)      ABG - ( 18 Apr 2018 13:24 )  pH: 7.42  /  pCO2: 40    /  pO2: 58    / HCO3: 26    / Base Excess: 1.7   /  SaO2: 91                  I&O's Detail:      17 Apr 2018 07:01  -  18 Apr 2018 07:00  --------------------------------------------------------  IN:    Enteral Tube Flush: 180 mL    fentaNYL  Infusion: 6.1 mL    IV PiggyBack: 450 mL    norepinephrine Infusion: 171.1 mL    Osmolite: 1080 mL  Total IN: 1887.2 mL    OUT:    Indwelling Catheter - Urethral: 400 mL  Total OUT: 400 mL    Total NET: 1487.2 mL      18 Apr 2018 07:01  -  18 Apr 2018 17:11  --------------------------------------------------------  IN:    Enteral Tube Flush: 60 mL    IV PiggyBack: 50 mL    Osmolite: 90 mL  Total IN: 200 mL    OUT:    Indwelling Catheter - Urethral: 165 mL  Total OUT: 165 mL    Total NET: 35 mL          LABS:                        8.1    15.06 )-----------( 297      ( 18 Apr 2018 04:30 )             24.5     04-18    139  |  103  |  41<H>  ----------------------------<  135<H>  4.8   |  25  |  1.4    Ca    8.1<L>      18 Apr 2018 04:30            CAPILLARY BLOOD GLUCOSE            CULTURES:  Culture Results:   No growth to date. (04-16 @ 17:03)  Culture Results:   No growth (04-16 @ 04:19)  Culture Results:   No growth to date. (04-15 @ 21:11)      PHYSICAL EXAM:    General: No acute distress.  Alert, oriented, interactive, nonfocal.    HEENT: Pupils equal, reactive to light symmetrically.    PULM: Clear to auscultation bilaterally, no significant sputum production.    CVS: Regular rate and rhythm, no murmurs, rubs, or gallops.    ABD: Soft, nondistended, nontender, no masses.    EXT: No edema, nontender.    SKIN: Warm and well perfused, no rashes noted.

## 2018-04-18 NOTE — PROGRESS NOTE ADULT - SUBJECTIVE AND OBJECTIVE BOX
Over Night Events:  Remains intubated on MV.  SP bronch.  Off pressors         ROS:  See HPI    PHYSICAL EXAM    ICU Vital Signs Last 24 Hrs  T(C): 37.7 (18 Apr 2018 04:00), Max: 38.1 (17 Apr 2018 22:00)  T(F): 99.8 (18 Apr 2018 04:00), Max: 100.6 (17 Apr 2018 22:00)  HR: 100 (18 Apr 2018 08:45) (78 - 118)  BP: 91/60 (18 Apr 2018 07:00) (79/57 - 132/85)  BP(mean): 68 (18 Apr 2018 07:00) (62 - 97)  ABP: --  ABP(mean): --  RR: 19 (18 Apr 2018 07:00) (15 - 46)  SpO2: 100% (18 Apr 2018 08:45) (95% - 100%)      General:  Off sedation for 1 hour.  Follows simple commands  HEENT: + ET             Lymph Nodes: No cervical LN   Lungs: Bilateral BS  Cardiovascular: Regular   Abdomen: Soft, Positive BS  Extremities: No clubbing   Skin: Warm  Neurological: Non focal       04-17-18 @ 07:01  -  04-18-18 @ 07:00  --------------------------------------------------------  IN:    Enteral Tube Flush: 180 mL    fentaNYL  Infusion: 6.1 mL    IV PiggyBack: 450 mL    norepinephrine Infusion: 171.1 mL    Osmolite: 1080 mL  Total IN: 1887.2 mL    OUT:    Indwelling Catheter - Urethral: 400 mL  Total OUT: 400 mL    Total NET: 1487.2 mL          LABS:                            8.1    15.06 )-----------( 297      ( 18 Apr 2018 04:30 )             24.5                                               04-18    139  |  103  |  41<H>  ----------------------------<  135<H>  4.8   |  25  |  1.4    Ca    8.1<L>      18 Apr 2018 04:30                                                   CARDIAC MARKERS ( 16 Apr 2018 11:51 )  x     / <0.01 ng/mL / 56 U/L / x     / 1.7 ng/mL                                                                                     Culture - Bronchial (collected 17 Apr 2018 13:00)  Source: .Broncial None  Gram Stain (18 Apr 2018 07:08):    Numerous polymorphonuclear leukocytes per low power field    No squamous epithelial cells per low power field    No organisms seen per oil power field    Culture - Body Fluid with Gram Stain (collected 16 Apr 2018 17:03)  Source: .Body Fluid Pleural Fluid  Gram Stain (17 Apr 2018 04:35):    polymorphonuclear leukocytes seen per low power field    No organisms seen per oil power field    by cytocentrifuge  Preliminary Report (17 Apr 2018 21:50):    No growth to date.    Culture - Urine (collected 16 Apr 2018 04:19)  Source: .Urine Clean Catch (Midstream)  Final Report (17 Apr 2018 13:09):    No growth    Culture - Blood (collected 15 Apr 2018 21:11)  Source: .Blood Blood-Peripheral  Preliminary Report (17 Apr 2018 03:01):    No growth to date.                                                   Mode: AC/ CMV (Assist Control/ Continuous Mandatory Ventilation)  RR (machine): 16  TV (machine): 400  FiO2: 40  PEEP: 8  ITime: 1  MAP: 14  PIP: 32                                      ABG - ( 18 Apr 2018 07:44 )  pH: 7.43  /  pCO2: 39    /  pO2: 112   / HCO3: 26    / Base Excess: 1.5   /  SaO2: 99                  MEDICATIONS  (STANDING):  cefepime  IVPB 1000 milliGRAM(s) IV Intermittent daily  chlorhexidine 0.12% Liquid 15 milliLiter(s) Swish and Spit two times a day  fentaNYL   Infusion 0.5 MICROgram(s)/kG/Hr (3.08 mL/Hr) IV Continuous <Continuous>  gabapentin 100 milliGRAM(s) Oral three times a day  levoFLOXacin IVPB 500 milliGRAM(s) IV Intermittent every 24 hours  norepinephrine Infusion 0.02 MICROgram(s)/kG/Min (2.31 mL/Hr) IV Continuous <Continuous>  pantoprazole   Suspension 40 milliGRAM(s) Oral before breakfast  vancomycin  IVPB 750 milliGRAM(s) IV Intermittent daily    MEDICATIONS  (PRN):  acetaminophen   Tablet 650 milliGRAM(s) Oral every 6 hours PRN For Temp greater than 38 C (100.4 F)      Xrays:            Not done                                                                         ECHO

## 2018-04-19 NOTE — PROGRESS NOTE ADULT - ASSESSMENT
IMPRESSION:    AHRF   HCAP  Pulmonary edema   Bilateral pleural effusions.  R > L.  RO parapneumonic effusions VS malignant effusion     PLAN:    CNS: Needs to be reintubated.  Patient refuses    HEENT: Oral care    PULMONARY:  HOB @ 45 degrees. Pulmonary toilet    CARDIOVASCULAR: I=O.     GI: GI prophylaxis. Hold Feeding  for now     RENAL:  Follow up lytes.  Correct as needed    INFECTIOUS DISEASE: Follow up cultures.      HEMATOLOGICAL:  DVT prophylaxis.    ENDOCRINE:  Follow up FS.  Insulin protocol if needed.    MUSCULOSKELETAL:    Prognosis poor     Palliative care evaluation

## 2018-04-19 NOTE — PROGRESS NOTE ADULT - SUBJECTIVE AND OBJECTIVE BOX
Over Night Events:  Extubated at 11;15.  Placed on BiPAP at 8 am for some decreased O2        ROS:  See HPI    PHYSICAL EXAM    ICU Vital Signs Last 24 Hrs  T(C): 38.4 (19 Apr 2018 06:00), Max: 38.4 (19 Apr 2018 04:00)  T(F): 101.1 (19 Apr 2018 06:00), Max: 101.1 (19 Apr 2018 04:00)  HR: 118 (19 Apr 2018 07:30) (100 - 138)  BP: 86/51 (19 Apr 2018 07:30) (74/52 - 111/71)  BP(mean): 63 (19 Apr 2018 07:30) (57 - 91)  ABP: --  ABP(mean): --  RR: 34 (19 Apr 2018 07:30) (22 - 53)  SpO2: 100% (19 Apr 2018 07:30) (82% - 100%)      General: in   HEENT: SANTIAGO             Lymph Nodes: No cervical LN   Lungs: Bilateral BS  Cardiovascular: Regular   Abdomen: Soft, Positive BS  Extremities: No clubbing   Skin: Warm  Neurological: Non focal       04-18-18 @ 07:01  -  04-19-18 @ 07:00  --------------------------------------------------------  IN:    Enteral Tube Flush: 60 mL    IV PiggyBack: 400 mL    norepinephrine Infusion: 40 mL    Osmolite: 90 mL  Total IN: 590 mL    OUT:    Indwelling Catheter - Urethral: 465 mL  Total OUT: 465 mL    Total NET: 125 mL          LABS:                            8.0    18.38 )-----------( 285      ( 19 Apr 2018 05:27 )             24.3                                               04-19    140  |  102  |  43<H>  ----------------------------<  61<L>  5.2<H>   |  24  |  1.4    Ca    8.3<L>      19 Apr 2018 05:27                                                                                                                                      Culture - Bronchial (collected 17 Apr 2018 13:00)  Source: .Broncial None  Gram Stain (18 Apr 2018 07:08):    Numerous polymorphonuclear leukocytes per low power field    No squamous epithelial cells per low power field    No organisms seen per oil power field  Preliminary Report (18 Apr 2018 21:38):    Normal Respiratory Candice present    Culture - Body Fluid with Gram Stain (collected 16 Apr 2018 17:03)  Source: .Body Fluid Pleural Fluid  Gram Stain (17 Apr 2018 04:35):    polymorphonuclear leukocytes seen per low power field    No organisms seen per oil power field    by cytocentrifuge  Preliminary Report (17 Apr 2018 21:50):    No growth to date.                                                   Mode: CPAP with PS  FiO2: 30  PEEP: 8  PS: 6                                      ABG - ( 19 Apr 2018 00:30 )  pH: 7.38  /  pCO2: 43    /  pO2: 74    / HCO3: 25    / Base Excess: -0.2  /  SaO2: 95                  MEDICATIONS  (STANDING):  cefepime  IVPB 1000 milliGRAM(s) IV Intermittent daily  gabapentin 100 milliGRAM(s) Oral three times a day  levoFLOXacin IVPB 500 milliGRAM(s) IV Intermittent every 24 hours  norepinephrine Infusion 0.02 MICROgram(s)/kG/Min (2.31 mL/Hr) IV Continuous <Continuous>  pantoprazole   Suspension 40 milliGRAM(s) Oral before breakfast  vancomycin  IVPB 750 milliGRAM(s) IV Intermittent daily    MEDICATIONS  (PRN):  acetaminophen  Suppository 650 milliGRAM(s) Rectal every 6 hours PRN For Temp greater than 38 C (100.4 F)      Xrays:     Right LL atelectasis and small effusion                                                                                 ECHO

## 2018-04-19 NOTE — PROGRESS NOTE ADULT - SUBJECTIVE AND OBJECTIVE BOX
Patient is a 68y old Female who presents with a chief complaint of sob, temperature, dec. BP (16 Apr 2018 05:53)    Currently admitted to medicine with the primary diagnosis of Pneumonia    Today is hospital day 3.    BRIEF HOSPITAL COURSE:     68 Y F [facility] temporary resident for rehab with PMH of anxiety/depression, h/o colon Ca s/p R hemicolectomy not on current treatment, COPD not on home O2 and HTN was brought to the ED to r/o PNA.     As per the patient she has been having productive cough for 2 days, associated with vomiting and diarrhea. She was sent in because a chest xray done at the NH showed r sided effusions.     In the ED Pt was hemodynamically stable but desating down to 89% on nasal canula. She was placed on bipap and is tolerating it well. Her CXR showed bilateral pneumonia and she was given cefepime, azithro and vanco. The patient denies abdominal pain or chest pain. She says her belly is generally distended and her LE edema has improved from before.     Day 1 ICU on rounds the patient was still consistently desaturating and requiring NIV. Anesthesia was called and she was intubated. Thoracentesis was preformed and one liter of fluid was drained from the right pleural cavity.    Day 2 ICU the patient was much more and alert on sedation vacation. Levophed was weened to very low dose. Bronchoscopy was preformed. SBT failed attemp in PM.    Day 3 ICU patient still alert and awake passed SBT today and was extubated at 11am. Passed bedside s&s.     PAST MEDICAL & SURGICAL HISTORY  Depression, unspecified depression type  Anxiety  Malignant neoplasm of colon, unspecified part of colon  HTN (hypertension)  COPD (chronic obstructive pulmonary disease)    SOCIAL HISTORY:  former smoker, no drugs, no etoh    REVIEW OF SYSTEMS:  See HPI    ALLERGIES:  NKDA    MEDICATIONS:  STANDING MEDICATIONS  cefepime  IVPB 1000 milliGRAM(s) IV Intermittent daily  chlorhexidine 0.12% Liquid 15 milliLiter(s) Swish and Spit two times a day  fentaNYL   Infusion 0.5 MICROgram(s)/kG/Hr IV Continuous <Continuous>  gabapentin 100 milliGRAM(s) Oral three times a day  levoFLOXacin IVPB 500 milliGRAM(s) IV Intermittent every 24 hours  norepinephrine Infusion 0.02 MICROgram(s)/kG/Min IV Continuous <Continuous>  pantoprazole   Suspension 40 milliGRAM(s) Oral before breakfast  vancomycin  IVPB 750 milliGRAM(s) IV Intermittent daily    PRN MEDICATIONS  acetaminophen  Suppository 650 milliGRAM(s) Rectal every 6 hours PRN    VITALS:     Mode: AC/ CMV (Assist Control/ Continuous Mandatory Ventilation)  RR (machine): 16  TV (machine): 400  FiO2: 100  PEEP: 10  ITime: 1  MAP: 15  PIP: 39      ICU Vital Signs Last 24 Hrs:    T(C): 36.7 (19 Apr 2018 20:00), Max: 38.4 (19 Apr 2018 04:00)  T(F): 98.1 (19 Apr 2018 20:00), Max: 101.1 (19 Apr 2018 04:00)  HR: 104 (19 Apr 2018 20:00) (86 - 140)  BP: 93/67 (19 Apr 2018 20:00) (68/45 - 155/78)  BP(mean): 81 (19 Apr 2018 20:00) (50 - 103)  ABP: --  ABP(mean): --  RR: 25 (19 Apr 2018 20:00) (12 - 47)  SpO2: 89% (19 Apr 2018 20:00) (85% - 100%)      ABG - ( 19 Apr 2018 12:17 )  pH: 7.35  /  pCO2: 46    /  pO2: 65    / HCO3: 26    / Base Excess: ?-0.0 /  SaO2: 93                  I&O's Detail:      18 Apr 2018 07:01  -  19 Apr 2018 07:00  --------------------------------------------------------  IN:    Enteral Tube Flush: 60 mL    IV PiggyBack: 400 mL    norepinephrine Infusion: 40 mL    Osmolite: 90 mL  Total IN: 590 mL    OUT:    Indwelling Catheter - Urethral: 465 mL  Total OUT: 465 mL    Total NET: 125 mL      19 Apr 2018 07:01  -  19 Apr 2018 20:30  --------------------------------------------------------  IN:    fentaNYL  Infusion: 73.8 mL    IV PiggyBack: 300 mL    Lactated Ringers IV Bolus: 500 mL    norepinephrine Infusion: 51.6 mL  Total IN: 925.4 mL    OUT:    Chest Tube: 15 mL    Indwelling Catheter - Urethral: 135 mL  Total OUT: 150 mL    Total NET: 775.4 mL          LABS:                        8.0    18.38 )-----------( 285      ( 19 Apr 2018 05:27 )             24.3     04-19    140  |  102  |  43<H>  ----------------------------<  61<L>  5.2<H>   |  24  |  1.4    Ca    8.3<L>      19 Apr 2018 05:27            CAPILLARY BLOOD GLUCOSE    CULTURES:  Culture Results:   Normal Respiratory Candice present (04-17 @ 13:00)  Culture Results:   No growth to date. (04-16 @ 17:03)  Culture Results:   No growth (04-16 @ 04:19)  Culture Results:   No growth to date. (04-15 @ 21:11)    PHYSICAL EXAM:    General: No acute distress.  Intubated and Sedated.    HEENT: Pupils equal and symmetrically reactive to light.    PULM: Clear to auscultation bilaterally.    CVS: Regular rate and rhythm, no murmurs, rubs, or gallops.    ABD: Soft, nondistended, no masses.    EXT: No edema.    SKIN: Warm and well perfused, no rashes.

## 2018-04-19 NOTE — PROVIDER CONTACT NOTE (OTHER) - SITUATION
Dr. Leyva at bedside: patient desaturated while on 55% venturi mask. Patient increasingly tachycardic and RN observed increased swelling to bilateral lower extremities.

## 2018-04-20 NOTE — PROGRESS NOTE ADULT - ASSESSMENT
IMPRESSION:    AHRF   HCAP  Pulmonary edema   Bilateral pleural effusions.  R > L.  RO parapneumonic effusions VS malignant effusion   MAGDY  Iatrogenic Pnemothorax    PLAN:    CNS: Morphine PRN for Analgesia    HEENT: Oral care    PULMONARY:  HOB @ 45 degrees. Chest tube on suction.    CARDIOVASCULAR: Give LR 1 liter    GI: GI prophylaxis. start OG tube feeds    RENAL:  Follow up lytes.  Correct as needed.     INFECTIOUS DISEASE: Follow up cultures.  complete antibiotic course.    HEMATOLOGICAL:  DVT prophylaxis.    ENDOCRINE:  Follow up FS.  Insulin protocol if needed.    MUSCULOSKELETAL:    Prognosis poor     Palliative care evaluation IMPRESSION:    AHRF   HCAP  Pulmonary edema   Bilateral pleural effusions.  R > L.  RO parapneumonic effusions VS malignant effusion   MAGDY  Left Iatrogenic Pneumothorax SP left chest tube   HO Colon cancer likely metastatic now     PLAN:    CNS: Morphine PRN for Analgesia.  Assess MS    HEENT: Oral care    PULMONARY:  HOB @ 45 degrees. Keep chest tube to suction.    CARDIOVASCULAR: I=O     GI: GI prophylaxis. OG tube feeds    RENAL:  Follow up lytes.  Correct as needed.     INFECTIOUS DISEASE: Follow up cultures.  Complete antibiotic course.    HEMATOLOGICAL:  DVT prophylaxis.    ENDOCRINE:  Follow up FS.  Insulin protocol if needed.    MUSCULOSKELETAL:    Prognosis poor

## 2018-04-20 NOTE — CHART NOTE - NSCHARTNOTEFT_GEN_A_CORE
Registered Dietitian Follow-Up     Patient Profile Reviewed                           Yes [x]   No []     Nutrition History Previously Obtained        Yes []  No [x]       Pertinent Subjective Information: Unable to obtain nutrition history at this time.     Pertinent Medical Interventions: Pt extubated, however required reintubation d/t worsening pulmonary edema & respiratory failure. Central line placed for pressor support. Pneumothorax developed, s/p chest tube placement.     Diet order: Pt NPO at this time. Last form of nutrition was received on 4/18 (continuous infusion of Osmolite 1.5 at 45 mL/hr).     Anthropometrics:  - Ht. ht data remains unavailable at this time  - Wt. 62.4 kg (4/19 wt)  - wt change vs. previous RD note wt of 61.6 kg     Pertinent Lab Data: 4/20: RBC-2.56, H/H-7.6/22.9, K-5.1, BUN-45, creat-1.7, gluc-63     Pertinent Meds: fentanyl, levophed in 0.9% NaCl, protonix     Physical Findings:  - Appearance: 3+ edema (B/L leg)  - GI function: abd firm, distended. Last BM 4/19.  - Tubes: OG tube.  - Oral/Mouth cavity: N/A  - Skin: Noted WDL     Nutrition Requirements  Weight Used: Will continue to use estimated needs from 4/17 RD assessment using wt 62.4 kg.     Energy: 2420-1978 kcal/day (25-30 kcal/kg ABW)    Protein: 75-93 g/day (1.2-1.5 g/kg ABW)    Fluids: per ICU team     Nutrient Intake: Pt not meeting nutrient needs at this time.     [x] Previous Nutrition Diagnosis: Less than optimal enteral nutrition (revised below)    Nutrition Diagnostic #1  Problem: Inadequate protein-energy intake  Etiology: respiratory failure requiring intubation  Statement: pt NPO at this time, nutrition support not ordered at this time.    Nutrition Intervention: Enteral Nutrition     Goal/Expected Outcome: Nutrition regimen determined & initiated as medically feasible, ideally meeting >85% of estimated nutrient needs within next 4 days.     Indicator/Monitoring: Energy intake, glucose profile, electrolyte profile, nutrition focused physical findings, body composition.    Recommendation: If plan to reinitiate EN, order Prostat q24hrs + Osmolite 1.5 at goal rate of 45 mL/hr as tolerated. Monitor Mg/PO4/K. EN regimen at goal to provide 1720 kcal, 82 g protein,, 821 mL free H2O. Flushes per LIP.

## 2018-04-20 NOTE — PROGRESS NOTE ADULT - ASSESSMENT
IMPRESSION:    AHRF   HCAP  Pulmonary edema   Bilateral pleural effusions.  R > L.  RO parapneumonic effusions VS malignant effusion   MAGDY  Left Iatrogenic Pneumothorax SP left chest tube   HO Colon cancer likely metastatic now     PLAN:    CNS: Morphine PRN for Analgesia.     HEENT: Oral care    PULMONARY:  HOB @ 45 degrees. Keep chest tube to suction.    CARDIOVASCULAR: I=O     GI: GI prophylaxis. OG tube feeds    RENAL:  Follow up lytes.  Correct as needed.     INFECTIOUS DISEASE: Follow up cultures.  Complete antibiotic course.    HEMATOLOGICAL:  DVT prophylaxis.    ENDOCRINE:  Follow up FS.  Insulin protocol if needed.

## 2018-04-20 NOTE — PROCEDURE NOTE - NSPROCDETAILS_GEN_ALL_CORE
patient pre-oxygenated, tube inserted, placement confirmed
patient pre-oxygenated, tube inserted, placement confirmed
dressing applied
location identified, draped/prepped, sterile technique used, needle inserted/introduced

## 2018-04-20 NOTE — PROCEDURE NOTE - NSINFORMCONSENT_GEN_A_CORE
Benefits, risks, and possible complications of procedure explained to patient/caregiver who verbalized understanding and gave verbal consent.
Benefits, risks, and possible complications of procedure explained to patient/caregiver who verbalized understanding and gave verbal consent.
This was an emergent procedure.
Benefits, risks, and possible complications of procedure explained to patient/caregiver who verbalized understanding and gave written consent.

## 2018-04-20 NOTE — PROGRESS NOTE ADULT - SUBJECTIVE AND OBJECTIVE BOX
Patient is a 68y old Female who presents with a chief complaint of sob, temperature, dec. BP (2018 05:53)    Currently admitted to medicine with the primary diagnosis of Pneumonia    Today is hospital day 5.    BRIEF HOSPITAL COURSE:     68 Y F [facility] temporary resident for rehab with PMH of anxiety/depression, h/o colon Ca s/p R hemicolectomy not on current treatment, COPD not on home O2 and HTN was brought to the ED to r/o PNA.     As per the patient she has been having productive cough for 2 days, associated with vomiting and diarrhea. She was sent in because a chest xray done at the NH showed r sided effusions.     In the ED Pt was hemodynamically stable but desating down to 89% on nasal canula. She was placed on bipap and is tolerating it well. Her CXR showed bilateral pneumonia and she was given cefepime, azithro and vanco. The patient denies abdominal pain or chest pain. She says her belly is generally distended and her LE edema has improved from before.     Day 1 ICU on rounds the patient was still consistently desaturating and requiring NIV. Anesthesia was called and she was intubated. Thoracentesis was preformed and one liter of fluid was drained from the right pleural cavity.    Day 2 ICU the patient was much more and alert on sedation vacation. Levophed was weened to very low dose. Bronchoscopy was preformed. SBT failed attemp in PM.    Day 3 ICU patient still alert and awake passed SBT today and was extubated at 11am. Passed bedside s&s.     Day 4 Patient required reintubation due to worsening pulmonary edema and respiratory failure. A central line was placed for pressor treatment and the patient unfortunately developed a pneumothorax. A Chest tube was placed and the patient stabilized.     Day 5 the patient is stable with an improving chest xray. Chest tube still producing air.      PAST MEDICAL & SURGICAL HISTORY  Depression, unspecified depression type  Anxiety  Malignant neoplasm of colon, unspecified part of colon  HTN (hypertension)  COPD (chronic obstructive pulmonary disease)    SOCIAL HISTORY:  former smoker, no drugs, no etoh    REVIEW OF SYSTEMS:  See HPI    ALLERGIES:  NKDA    MEDICATIONS:  STANDING MEDICATIONS  chlorhexidine 0.12% Liquid 15 milliLiter(s) Swish and Spit two times a day  fentaNYL   Infusion 0.5 MICROgram(s)/kG/Hr IV Continuous <Continuous>  gabapentin 100 milliGRAM(s) Oral three times a day  lactated ringers. 500 milliLiter(s) IV Continuous <Continuous>  norepinephrine Infusion 0.02 MICROgram(s)/kG/Min IV Continuous <Continuous>  pantoprazole   Suspension 40 milliGRAM(s) Oral before breakfast    PRN MEDICATIONS  acetaminophen  Suppository 650 milliGRAM(s) Rectal every 6 hours PRN    VITALS:     Mode: AC/ CMV (Assist Control/ Continuous Mandatory Ventilation)  RR (machine): 16  TV (machine): 350  FiO2: 40  PEEP: 8  ITime: 1  MAP: 15  PIP: 30      ICU Vital Signs Last 24 Hrs:    T(C): 36.5 (2018 08:00), Max: 37.4 (2018 04:00)  T(F): 97.7 (2018 08:00), Max: 99.4 (2018 04:00)  HR: 102 (2018 12:15) (78 - 132)  BP: 99/58 (2018 12:00) (68/45 - 116/72)  BP(mean): 71 (2018 12:00) (50 - 88)  ABP: --  ABP(mean): --  RR: 24 (2018 12:15) (15 - 29)  SpO2: 96% (2018 12:15) (94% - 100%)      ABG - ( 2018 06:35 )  pH: 7.41  /  pCO2: 41    /  pO2: 152   / HCO3: 26    / Base Excess: 1.1   /  SaO2: 100       I&O's Detail:    2018 07:01  -  2018 07:00  --------------------------------------------------------  IN:    fentaNYL  Infusion: 140.9 mL    IV PiggyBack: 300 mL    Lactated Ringers IV Bolus: 500 mL    norepinephrine Infusion: 203.2 mL    Oral Fluid: 800 mL  Total IN: 1944.1 mL    OUT:    Chest Tube: 15 mL    Indwelling Catheter - Urethral: 170 mL  Total OUT: 185 mL    Total NET: 1759.1 mL    2018 07:01  -  2018 14:26  --------------------------------------------------------  IN:    fentaNYL  Infusion: 6.1 mL    lactated ringers.: 500 mL    norepinephrine Infusion: 35 mL  Total IN: 541.1 mL    OUT:    Indwelling Catheter - Urethral: 65 mL  Total OUT: 65 mL    Total NET: 476.1 mL    LABS:                        7.6    11.83 )-----------( 289      ( 2018 05:59 )             22.9         138  |  100  |  45<H>  ----------------------------<  63<L>  5.1<H>   |  27  |  1.7<H>    Ca    8.6      2018 05:59    Urinalysis Basic - ( 2018 02:43 )    Color: Yellow / Appearance: Cloudy / S.020 / pH: x  Gluc: x / Ketone: Negative  / Bili: Negative / Urobili: 0.2 mg/dL   Blood: x / Protein: 30 mg/dL / Nitrite: Negative   Leuk Esterase: Small / RBC: 5-10 /HPF / WBC 3-5 /HPF   Sq Epi: x / Non Sq Epi: x / Bacteria: Moderate /HPF    CULTURES:  Culture Results:   Normal Respiratory Candice present ( @ 13:00)  Culture Results:   No growth to date. ( @ 17:03)  Culture Results:   No growth ( @ 04:19)  Culture Results:   No growth to date. (04-15 @ 21:11)      PHYSICAL EXAM:    General: No acute distress.  Intubated and Sedated.    HEENT: Pupils equal and symmetrically reactive to light.    PULM: positive for wheeze    CVS: Regular rate and rhythm, no murmurs, rubs, or gallops.    ABD: Soft, nondistended, no masses.    EXT: No edema.    SKIN: Warm and well perfused, no rashes.

## 2018-04-20 NOTE — PROGRESS NOTE ADULT - SUBJECTIVE AND OBJECTIVE BOX
Over Night Events: NO events overnight. Sedated on fentanyl overnight. On levophed .007.        ROS:  See HPI    PHYSICAL EXAM    ICU Vital Signs Last 24 Hrs  T(C): 37.4 (2018 04:00), Max: 37.4 (2018 04:00)  T(F): 99.4 (2018 04:00), Max: 99.4 (2018 04:00)  HR: 98 (2018 09:18) (78 - 140)  BP: 92/57 (2018 07:00) (68/45 - 155/78)  BP(mean): 70 (2018 07:00) (50 - 103)  ABP: --  ABP(mean): --  RR: 18 (2018 06:00) (12 - 39)  SpO2: 99% (2018 09:18) (94% - 100%)      General:  HEENT: sedated. Withdraws to pain           Lymph Nodes: No cervical LN   Lungs: Bilateral BS  Cardiovascular: Regular   Abdomen: Soft, Positive BS  Extremities: No clubbing   Skin: Warm  Neurological: Non focal       18 @ 07:01  -  18 @ 07:00  --------------------------------------------------------  IN:    fentaNYL  Infusion: 140.9 mL    IV PiggyBack: 300 mL    Lactated Ringers IV Bolus: 500 mL    norepinephrine Infusion: 203.2 mL    Oral Fluid: 800 mL  Total IN: 1944.1 mL    OUT:    Chest Tube: 15 mL    Indwelling Catheter - Urethral: 170 mL  Total OUT: 185 mL    Total NET: 1759.1 mL          LABS:                            7.6    11.83 )-----------( 289      ( 2018 05:59 )             22.9                                               04-20    138  |  100  |  45<H>  ----------------------------<  63<L>  5.1<H>   |  27  |  1.7<H>    Ca    8.6      2018 05:59                                               Urinalysis Basic - ( 2018 02:43 )    Color: Yellow / Appearance: Cloudy / S.020 / pH: x  Gluc: x / Ketone: Negative  / Bili: Negative / Urobili: 0.2 mg/dL   Blood: x / Protein: 30 mg/dL / Nitrite: Negative   Leuk Esterase: Small / RBC: 5-10 /HPF / WBC 3-5 /HPF   Sq Epi: x / Non Sq Epi: x / Bacteria: Moderate /HPF                                                                                           Culture - Bronchial (collected 2018 13:00)  Source: .Broncial None  Gram Stain (2018 07:08):    Numerous polymorphonuclear leukocytes per low power field    No squamous epithelial cells per low power field    No organisms seen per oil power field  Final Report (2018 18:47):    Normal Respiratory Candice present                                                   Mode: AC/ CMV (Assist Control/ Continuous Mandatory Ventilation)  RR (machine): 16  TV (machine): 400  FiO2: 40  PEEP: 8  ITime: 1  MAP: 15  PIP: 30                                      ABG - ( 2018 06:35 )  pH: 7.41  /  pCO2: 41    /  pO2: 152   / HCO3: 26    / Base Excess: 1.1   /  SaO2: 100                 MEDICATIONS  (STANDING):  cefepime  IVPB 1000 milliGRAM(s) IV Intermittent daily  chlorhexidine 0.12% Liquid 15 milliLiter(s) Swish and Spit two times a day  fentaNYL   Infusion 0.5 MICROgram(s)/kG/Hr (3.08 mL/Hr) IV Continuous <Continuous>  gabapentin 100 milliGRAM(s) Oral three times a day  levoFLOXacin IVPB 500 milliGRAM(s) IV Intermittent every 24 hours  norepinephrine Infusion 0.02 MICROgram(s)/kG/Min (2.31 mL/Hr) IV Continuous <Continuous>  pantoprazole   Suspension 40 milliGRAM(s) Oral before breakfast    MEDICATIONS  (PRN):  acetaminophen  Suppository 650 milliGRAM(s) Rectal every 6 hours PRN For Temp greater than 38 C (100.4 F)      Xrays:                                                                                     ECHO Over Night Events: NO events overnight. Sedated on fentanyl overnight. On levophed .007.  SP chest tube yesterday         ROS:  See HPI    PHYSICAL EXAM    ICU Vital Signs Last 24 Hrs  T(C): 37.4 (2018 04:00), Max: 37.4 (2018 04:00)  T(F): 99.4 (2018 04:00), Max: 99.4 (2018 04:00)  HR: 98 (2018 09:18) (78 - 140)  BP: 92/57 (2018 07:00) (68/45 - 155/78)  BP(mean): 70 (2018 07:00) (50 - 103)  ABP: --  ABP(mean): --  RR: 18 (2018 06:00) (12 - 39)  SpO2: 99% (2018 09:18) (94% - 100%)      General: In NAD   HEENT: sedated. Withdraws to pain           Lymph Nodes: No cervical LN   Lungs: Bilateral BS  Cardiovascular: Regular   Abdomen: Soft, Positive BS  Extremities: No clubbing   Skin: Warm  Neurological: Non focal       18 @ 07:01  -  18 @ 07:00  --------------------------------------------------------  IN:    fentaNYL  Infusion: 140.9 mL    IV PiggyBack: 300 mL    Lactated Ringers IV Bolus: 500 mL    norepinephrine Infusion: 203.2 mL    Oral Fluid: 800 mL  Total IN: 1944.1 mL    OUT:    Chest Tube: 15 mL    Indwelling Catheter - Urethral: 170 mL  Total OUT: 185 mL    Total NET: 1759.1 mL          LABS:                            7.6    11.83 )-----------( 289      ( 2018 05:59 )             22.9                                               04-    138  |  100  |  45<H>  ----------------------------<  63<L>  5.1<H>   |  27  |  1.7<H>    Ca    8.6      2018 05:59                                               Urinalysis Basic - ( 2018 02:43 )    Color: Yellow / Appearance: Cloudy / S.020 / pH: x  Gluc: x / Ketone: Negative  / Bili: Negative / Urobili: 0.2 mg/dL   Blood: x / Protein: 30 mg/dL / Nitrite: Negative   Leuk Esterase: Small / RBC: 5-10 /HPF / WBC 3-5 /HPF   Sq Epi: x / Non Sq Epi: x / Bacteria: Moderate /HPF                                                                                           Culture - Bronchial (collected 2018 13:00)  Source: .Broncial None  Gram Stain (2018 07:08):    Numerous polymorphonuclear leukocytes per low power field    No squamous epithelial cells per low power field    No organisms seen per oil power field  Final Report (2018 18:47):    Normal Respiratory Candice present                                                   Mode: AC/ CMV (Assist Control/ Continuous Mandatory Ventilation)  RR (machine): 16  TV (machine): 400  FiO2: 40  PEEP: 8  ITime: 1  MAP: 15  PIP: 30                                      ABG - ( 2018 06:35 )  pH: 7.41  /  pCO2: 41    /  pO2: 152   / HCO3: 26    / Base Excess: 1.1   /  SaO2: 100                 MEDICATIONS  (STANDING):  cefepime  IVPB 1000 milliGRAM(s) IV Intermittent daily  chlorhexidine 0.12% Liquid 15 milliLiter(s) Swish and Spit two times a day  fentaNYL   Infusion 0.5 MICROgram(s)/kG/Hr (3.08 mL/Hr) IV Continuous <Continuous>  gabapentin 100 milliGRAM(s) Oral three times a day  levoFLOXacin IVPB 500 milliGRAM(s) IV Intermittent every 24 hours  norepinephrine Infusion 0.02 MICROgram(s)/kG/Min (2.31 mL/Hr) IV Continuous <Continuous>  pantoprazole   Suspension 40 milliGRAM(s) Oral before breakfast    MEDICATIONS  (PRN):  acetaminophen  Suppository 650 milliGRAM(s) Rectal every 6 hours PRN For Temp greater than 38 C (100.4 F)      Xrays:        ET OK.  Chest tube OK.  Small residual pneumothorax                                                                         ECHO

## 2018-04-21 NOTE — PROGRESS NOTE ADULT - SUBJECTIVE AND OBJECTIVE BOX
Over Night Events:    no overnight events. Remains intubated, failed weaning trial, SOB and tachycardia  interval increase in L PTX    ROS:  See HPI    PHYSICAL EXAM    ICU Vital Signs Last 24 Hrs  T(C): 36.9 (2018 08:00), Max: 37.1 (2018 16:00)  T(F): 98.5 (2018 08:00), Max: 98.8 (2018 16:00)  HR: 100 (2018 10:15) (92 - 122)  BP: 100/64 (2018 10:15) (71/46 - 156/79)  BP(mean): 80 (2018 10:15) (53 - 112)  ABP: --  ABP(mean): --  RR: 30 (2018 10:15) (18 - 44)  SpO2: 100% (2018 10:15) (88% - 100%)      General: Awake and follows commands off sedation  HEENT: SANTIAGO             Lymph Nodes: No cervical LN   Lungs: Bilateral BS  Cardiovascular: Regular   Abdomen: Soft, Positive BS  Extremities: No clubbing   Skin: Warm  Neurological: Non focal       18 @ 07:  -  18 @ 07:00  --------------------------------------------------------  IN:    fentaNYL  Infusion: 6.1 mL    IV PiggyBack: 250 mL    lactated ringers.: 500 mL    norepinephrine Infusion: 112 mL    Osmolite: 325 mL  Total IN: 1193.1 mL    OUT:    Chest Tube: 10 mL    Indwelling Catheter - Urethral: 225 mL  Total OUT: 235 mL    Total NET: 958.1 mL      18 @ 07:  -  18 @ 10:28  --------------------------------------------------------  IN:    norepinephrine Infusion: 18.4 mL    Osmolite: 180 mL  Total IN: 198.4 mL    OUT:    Indwelling Catheter - Urethral: 60 mL  Total OUT: 60 mL    Total NET: 138.4 mL          LABS:                          7.1    9.01  )-----------( 254      ( 2018 04:57 )             21.6                                               04-21    134<L>  |  100  |  48<H>  ----------------------------<  111<H>  4.9   |  24  |  1.6<H>    Ca    8.3<L>      2018 04:57                                               Urinalysis Basic - ( 2018 02:43 )    Color: Yellow / Appearance: Cloudy / S.020 / pH: x  Gluc: x / Ketone: Negative  / Bili: Negative / Urobili: 0.2 mg/dL   Blood: x / Protein: 30 mg/dL / Nitrite: Negative   Leuk Esterase: Small / RBC: 5-10 /HPF / WBC 3-5 /HPF   Sq Epi: x / Non Sq Epi: x / Bacteria: Moderate /HPF                                                                                                                                        Mode: AC/ CMV (Assist Control/ Continuous Mandatory Ventilation)  RR (machine): 16  TV (machine): 350  FiO2: 40  PEEP: 8  ITime: 1  MAP: 13  PIP: 23                                      ABG - ( 2018 07:38 )  pH: 7.48  /  pCO2: 36    /  pO2: 86    / HCO3: 27    / Base Excess: 3.1   /  SaO2: 98                  MEDICATIONS  (STANDING):  cefepime  IVPB 1000 milliGRAM(s) IV Intermittent daily  chlorhexidine 0.12% Liquid 15 milliLiter(s) Swish and Spit two times a day  gabapentin 100 milliGRAM(s) Oral three times a day  levoFLOXacin IVPB 750 milliGRAM(s) IV Intermittent every 24 hours  norepinephrine Infusion 0.02 MICROgram(s)/kG/Min (2.31 mL/Hr) IV Continuous <Continuous>  pantoprazole   Suspension 40 milliGRAM(s) Oral before breakfast  vancomycin  IVPB 750 milliGRAM(s) IV Intermittent daily    MEDICATIONS  (PRN):  acetaminophen  Suppository 650 milliGRAM(s) Rectal every 6 hours PRN For Temp greater than 38 C (100.4 F)  morphine  - Injectable 2 milliGRAM(s) IV Push every 4 hours PRN Moderate Pain (4 - 6)      Xrays:                                                   residual L PTX, no air leak                                  ECHO  R pleural effusion Over Night Events:    no overnight events. Remains intubated, failed weaning trial, SOB and tachycardia  interval increase in L PTX and SQ empysema    ROS:  See HPI    PHYSICAL EXAM    ICU Vital Signs Last 24 Hrs  T(C): 36.9 (2018 08:00), Max: 37.1 (2018 16:00)  T(F): 98.5 (2018 08:00), Max: 98.8 (2018 16:00)  HR: 100 (2018 10:15) (92 - 122)  BP: 100/64 (2018 10:15) (71/46 - 156/79)  BP(mean): 80 (2018 10:15) (53 - 112)  RR: 30 (2018 10:15) (18 - 44)  SpO2: 100% (2018 10:15) (88% - 100%)      General: Awake and follows commands off sedation  HEENT: SANTIAGO             Lymph Nodes: No cervical LN   Lungs: Bilateral BS  Cardiovascular: Regular   Abdomen: Soft, Positive BS  Extremities: No clubbing   Skin: Warm  Neurological: Non focal       18 @ 07:  -  18 @ 07:00  --------------------------------------------------------  IN:    fentaNYL  Infusion: 6.1 mL    IV PiggyBack: 250 mL    lactated ringers.: 500 mL    norepinephrine Infusion: 112 mL    Osmolite: 325 mL  Total IN: 1193.1 mL    OUT:    Chest Tube: 10 mL    Indwelling Catheter - Urethral: 225 mL  Total OUT: 235 mL    Total NET: 958.1 mL      18 @ 07:  -  18 @ 10:28  --------------------------------------------------------  IN:    norepinephrine Infusion: 18.4 mL    Osmolite: 180 mL  Total IN: 198.4 mL    OUT:    Indwelling Catheter - Urethral: 60 mL  Total OUT: 60 mL    Total NET: 138.4 mL          LABS:                          7.1    9.01  )-----------( 254      ( 2018 04:57 )             21.6                                               04-21    134<L>  |  100  |  48<H>  ----------------------------<  111<H>  4.9   |  24  |  1.6<H>    Ca    8.3<L>      2018 04:57                                               Urinalysis Basic - ( 2018 02:43 )    Color: Yellow / Appearance: Cloudy / S.020 / pH: x  Gluc: x / Ketone: Negative  / Bili: Negative / Urobili: 0.2 mg/dL   Blood: x / Protein: 30 mg/dL / Nitrite: Negative   Leuk Esterase: Small / RBC: 5-10 /HPF / WBC 3-5 /HPF   Sq Epi: x / Non Sq Epi: x / Bacteria: Moderate /HPF                              Mode: AC/ CMV (Assist Control/ Continuous Mandatory Ventilation)  RR (machine): 16  TV (machine): 350  FiO2: 40  PEEP: 8  ITime: 1  MAP: 13  PIP: 23                                      ABG - ( 2018 07:38 )  pH: 7.48  /  pCO2: 36    /  pO2: 86    / HCO3: 27    / Base Excess: 3.1   /  SaO2: 98        MEDICATIONS  (STANDING):  cefepime  IVPB 1000 milliGRAM(s) IV Intermittent daily  chlorhexidine 0.12% Liquid 15 milliLiter(s) Swish and Spit two times a day  gabapentin 100 milliGRAM(s) Oral three times a day  levoFLOXacin IVPB 750 milliGRAM(s) IV Intermittent every 24 hours  norepinephrine Infusion 0.02 MICROgram(s)/kG/Min (2.31 mL/Hr) IV Continuous <Continuous>  pantoprazole   Suspension 40 milliGRAM(s) Oral before breakfast  vancomycin  IVPB 750 milliGRAM(s) IV Intermittent daily    MEDICATIONS  (PRN):  acetaminophen  Suppository 650 milliGRAM(s) Rectal every 6 hours PRN For Temp greater than 38 C (100.4 F)  morphine  - Injectable 2 milliGRAM(s) IV Push every 4 hours PRN Moderate Pain (4 - 6)      Xrays:                                                   residual L PTX, no air leak                                  ECHO  R pleural effusion

## 2018-04-21 NOTE — CONSULT NOTE ADULT - PROBLEM SELECTOR RECOMMENDATION 9
Keep chest tube on suction. Do not cover attachement sites with Foam tape. Use clear tegaderm. Repeat CXR in 4 hours to check for resolution.

## 2018-04-21 NOTE — PROGRESS NOTE ADULT - ASSESSMENT
IMPRESSION:    AHRF   HCAP  Pulmonary edema   Bilateral pleural effusions.  R > L.  RO parapneumonic effusions VS malignant effusion   MAGDY  Left Iatrogenic Pneumothorax SP left chest tube   HO Colon cancer likely metastatic now     PLAN:    CNS: Start fentanyl,    HEENT: Oral care    PULMONARY:  HOB @ 45 degrees. Keep chest tube to suction.  CT surgery consult for possible Chest tube adjustment    CARDIOVASCULAR: I>O   LR 100ML/HR X 1L    GI: GI prophylaxis. OG tube feeds    RENAL:  Follow up lytes.  Correct as needed.     INFECTIOUS DISEASE: Follow up cultures.  Complete antibiotic course.    HEMATOLOGICAL:  DVT prophylaxis.    ENDOCRINE:  Follow up FS.  Insulin protocol if needed.    MUSCULOSKELETAL:    Prognosis poor

## 2018-04-21 NOTE — PROGRESS NOTE ADULT - SUBJECTIVE AND OBJECTIVE BOX
HOSPITAL COURSE:    68 Y F [facility] temporary resident for rehab with PMH of anxiety/depression, h/o colon Ca s/p R hemicolectomy not on current treatment, COPD not on home O2 and HTN was brought to the ED to r/o PNA.     As per the patient she has been having productive cough for 2 days, associated with vomiting and diarrhea. She was sent in because a chest xray done at the NH showed r sided effusions.     In the ED Pt was hemodynamically stable but desating down to 89% on nasal canula. She was placed on bipap and is tolerating it well. Her CXR showed bilateral pneumonia and she was given cefepime, azithro and vanco. The patient denies abdominal pain or chest pain. She says her belly is generally distended and her LE edema has improved from before.     Day 1 ICU on rounds the patient was still consistently desaturating and requiring NIV. Anesthesia was called and she was intubated. Thoracentesis was preformed and one liter of fluid was drained from the right pleural cavity.    Day 2 ICU the patient was much more and alert on sedation vacation. Levophed was weened to very low dose. Bronchoscopy was preformed. SBT failed attemp in PM.    Day 3 ICU patient still alert and awake passed SBT today and was extubated at 11am. Passed bedside s&s.     Day 4 Patient required reintubation due to worsening pulmonary edema and respiratory failure. A central line was placed for pressor treatment and the patient unfortunately developed a pneumothorax. A Chest tube was placed and the patient stabilized.     Day 5 the patient is stable with an improving chest xray. Chest tube still producing air bubbles in drainage tank.     Day 6 - Patient continues to be vented. Pneumo was worsening on CXR this morning. CT surg was consulted but the problems was found to be in the chest tube drainage tubing. Problem was fixed and repeat xray shows much improvement.     PAST MEDICAL & SURGICAL HISTORY  Depression, unspecified depression type  Anxiety  Malignant neoplasm of colon, unspecified part of colon  HTN (hypertension)  COPD (chronic obstructive pulmonary disease)    SOCIAL HISTORY:  former smoker, no drugs, no etoh    REVIEW OF SYSTEMS:  See HPI    ALLERGIES:  NKDA    MEDICATIONS:  STANDING MEDICATIONS  cefepime  IVPB 1000 milliGRAM(s) IV Intermittent daily  chlorhexidine 0.12% Liquid 15 milliLiter(s) Swish and Spit two times a day  fentaNYL   Infusion 0.1 MICROgram(s)/kG/Hr IV Continuous <Continuous>  gabapentin 100 milliGRAM(s) Oral three times a day  lactated ringers. 1000 milliLiter(s) IV Continuous <Continuous>  levoFLOXacin IVPB 750 milliGRAM(s) IV Intermittent every 24 hours  norepinephrine Infusion 0.02 MICROgram(s)/kG/Min IV Continuous <Continuous>  pantoprazole   Suspension 40 milliGRAM(s) Oral before breakfast  vancomycin  IVPB 750 milliGRAM(s) IV Intermittent daily    PRN MEDICATIONS  acetaminophen  Suppository 650 milliGRAM(s) Rectal every 6 hours PRN    VITALS:     Mode: AC/ CMV (Assist Control/ Continuous Mandatory Ventilation)  RR (machine): 16  TV (machine): 350  FiO2: 40  PEEP: 8  ITime: 1  MAP: 9  PIP: 29      ICU Vital Signs Last 24 Hrs:    T(C): 36.9 (2018 16:00), Max: 37.1 (2018 00:00)  T(F): 98.5 (2018 16:00), Max: 98.8 (2018 00:00)  HR: 78 (2018 19:00) (78 - 107)  BP: 98/54 (2018 19:00) (76/56 - 156/79)  BP(mean): 65 (2018 19:00) (54 - 112)  ABP: --  ABP(mean): --  RR: 18 (2018 19:00) (6 - 39)  SpO2: 100% (2018 19:00) (99% - 100%)      ABG - ( 2018 14:38 )  pH: 7.35  /  pCO2: 48    /  pO2: 116   / HCO3: 26    / Base Excess: 0.4   /  SaO2: 99                  I&O's Detail:      2018 07:01  -  2018 07:00  --------------------------------------------------------  IN:    fentaNYL  Infusion: 6.1 mL    IV PiggyBack: 250 mL    lactated ringers.: 500 mL    norepinephrine Infusion: 112 mL    Osmolite: 325 mL  Total IN: 1193.1 mL    OUT:    Chest Tube: 10 mL    Indwelling Catheter - Urethral: 225 mL  Total OUT: 235 mL    Total NET: 958.1 mL      2018 07:01  -  2018 19:55  --------------------------------------------------------  IN:    fentaNYL  Infusion: 73.3 mL    IV PiggyBack: 350 mL    lactated ringers.: 700 mL    norepinephrine Infusion: 55 mL    Osmolite: 540 mL  Total IN: 1718.3 mL    OUT:    Indwelling Catheter - Urethral: 175 mL  Total OUT: 175 mL    Total NET: 1543.3 mL    LABS:                        7.1    9.01  )-----------( 254      ( 2018 04:57 )             21.6     04-    134<L>  |  100  |  48<H>  ----------------------------<  111<H>  4.9   |  24  |  1.6<H>    Ca    8.3<L>      2018 04:57    CAPILLARY BLOOD GLUCOSE    Urinalysis Basic - ( 2018 02:43 )    Color: Yellow / Appearance: Cloudy / S.020 / pH: x  Gluc: x / Ketone: Negative  / Bili: Negative / Urobili: 0.2 mg/dL   Blood: x / Protein: 30 mg/dL / Nitrite: Negative   Leuk Esterase: Small / RBC: 5-10 /HPF / WBC 3-5 /HPF   Sq Epi: x / Non Sq Epi: x / Bacteria: Moderate /HPF    CULTURES:  Culture Results:   Normal Respiratory Candice present ( @ 13:00)  Culture Results:   No growth to date. ( @ 17:03)  Culture Results:   No growth ( @ 04:19)  Culture Results:   No growth at 5 days. (04-15 @ 21:11)    PHYSICAL EXAM:    General: No acute distress.  Intubated and Sedated.    HEENT: Pupils equal and symmetrically reactive to light.    PULM: CTA B/L    CVS: Regular rate and rhythm, no murmurs, rubs, or gallops.    ABD: Soft, nondistended, no masses.    EXT: No edema.    SKIN: Warm and well perfused, no rashes.

## 2018-04-21 NOTE — CONSULT NOTE ADULT - ATTENDING COMMENTS
CTS ATTENDING    PT INTUBATED WITH RESPIRATORY FAILURE  REQUIRED LEFT TUBE THORACOSTOMY FOR IATROGENIC PNEUMOTHORAX  VEERY SMALL PNEUMO ON TODAYS XRAY  NO AIR LEAK  MAY PLACE TUBE ON WATER SEAL  F/U XRAY TOMORROW

## 2018-04-21 NOTE — CONSULT NOTE ADULT - ADDITIONAL PE
Chest tube in place. The connection to the tube was redone with tegaderm and placed back on suction.

## 2018-04-22 NOTE — PROGRESS NOTE ADULT - ASSESSMENT
IMPRESSION:    AHRF   HCAP  Pulmonary edema   Bilateral pleural effusions.  R > L.  RO parapneumonic effusions VS malignant effusion   MAGDY  Left Iatrogenic Pneumothorax SP left chest tube   HO Colon cancer likely metastatic now     PLAN:    CNS: Start fentanyl,    HEENT: Oral care    PULMONARY:  HOB @ 45 degrees. Keep chest tube to suction, decrease PEEP to 5     CARDIOVASCULAR: I>O , Bolus LR 1 Litre, d/c IV fluids, Start Midodrine 10 mg Q 8hrs, Wean off levophed    GI: GI prophylaxis. OG tube feeds    RENAL:  Follow up lytes.  Correct as needed.     INFECTIOUS DISEASE: Follow up cultures.  Complete antibiotic course.    HEMATOLOGICAL:  DVT prophylaxis. Heparin SC     ENDOCRINE:  Follow up FS.  Insulin protocol if needed.    MUSCULOSKELETAL:    Prognosis poor IMPRESSION:    AHRF   HCAP  Pulmonary edema   Bilateral pleural effusions.  R > L.  RO parapneumonic effusions VS malignant effusion   MAGDY  Left Iatrogenic Pneumothorax SP left chest tube   HO Colon cancer likely metastatic now     PLAN:    CNS: Start fentanyl,    HEENT: Oral care    PULMONARY:  HOB @ 45 degrees. Keep chest tube to suction, decrease PEEP to 5, CT Chest Non Contrast    CARDIOVASCULAR: I>O , Bolus LR 1 Litre, d/c IV fluids, Start Midodrine 10 mg Q 8hrs, Wean off levophed    GI: GI prophylaxis. OG tube feeds    RENAL:  Follow up lytes.  Correct as needed.     INFECTIOUS DISEASE: Follow up cultures.  Complete antibiotic course.    HEMATOLOGICAL:  DVT prophylaxis. Heparin SC     ENDOCRINE:  Follow up FS.  Insulin protocol if needed.    MUSCULOSKELETAL:    Prognosis poor

## 2018-04-22 NOTE — PROGRESS NOTE ADULT - SUBJECTIVE AND OBJECTIVE BOX
Over Night Events:  Patient seen and examined. remains intubated, sedated with fentanyl, on levophed 0.03      ROS:  See HPI    PHYSICAL EXAM    ICU Vital Signs Last 24 Hrs  T(C): 37.4 (22 Apr 2018 04:00), Max: 37.4 (22 Apr 2018 04:00)  T(F): 99.4 (22 Apr 2018 04:00), Max: 99.4 (22 Apr 2018 04:00)  HR: 101 (22 Apr 2018 09:06) (78 - 116)  BP: 80/57 (22 Apr 2018 07:00) (76/56 - 115/68)  BP(mean): 63 (22 Apr 2018 07:00) (58 - 85)  ABP: --  ABP(mean): --  RR: 8 (22 Apr 2018 07:00) (6 - 40)  SpO2: 100% (22 Apr 2018 09:06) (94% - 100%)      General:  HEENT: awake, alert               Lymph Nodes: NO cervical LN   Lungs: Bilateral BS  Cardiovascular: Regular   Abdomen: Soft, Positive BS  Extremities: No clubbing   Skin:   Neurological: non focal     I&O's Detail    21 Apr 2018 07:01  -  22 Apr 2018 07:00  --------------------------------------------------------  IN:    Enteral Tube Flush: 100 mL    fentaNYL  Infusion: 183.7 mL    IV PiggyBack: 450 mL    lactated ringers.: 1900 mL    norepinephrine Infusion: 106.6 mL    Osmolite: 1035 mL  Total IN: 3775.3 mL    OUT:    Chest Tube: 30 mL    Indwelling Catheter - Urethral: 395 mL  Total OUT: 425 mL    Total NET: 3350.3 mL          LABS:                          7.1    9.83  )-----------( 245      ( 22 Apr 2018 04:56 )             21.3         22 Apr 2018 04:56    135    |  100    |  42     ----------------------------<  124    4.8     |  27     |  1.2      Ca    8.0        22 Apr 2018 04:56  Phos  1.6       22 Apr 2018 04:56  Mg     1.7       22 Apr 2018 04:56    TPro  4.6    /  Alb  1.9    /  TBili  0.2    /  DBili  <0.2   /  AST  48     /  ALT  9      /  AlkPhos  212    22 Apr 2018 04:56  Amylase x     lipase x                                                                                 Mode: AC/ CMV (Assist Control/ Continuous Mandatory Ventilation)  RR (machine): 16  TV (machine): 350  FiO2: 40  PEEP: 8  ITime: 1  MAP: 14  PIP: 32                                      ABG - ( 22 Apr 2018 07:31 )  pH: 7.35  /  pCO2: 48    /  pO2: 84    / HCO3: 26    / Base Excess: 0.2   /  SaO2: 96          MEDICATIONS  (STANDING):  cefepime  IVPB 1000 milliGRAM(s) IV Intermittent daily  chlorhexidine 0.12% Liquid 15 milliLiter(s) Swish and Spit two times a day  fentaNYL   Infusion 0.1 MICROgram(s)/kG/Hr (0.616 mL/Hr) IV Continuous <Continuous>  gabapentin 100 milliGRAM(s) Oral three times a day  lactated ringers. 1000 milliLiter(s) (100 mL/Hr) IV Continuous <Continuous>  levoFLOXacin IVPB 750 milliGRAM(s) IV Intermittent every 24 hours  norepinephrine Infusion 0.02 MICROgram(s)/kG/Min (2.31 mL/Hr) IV Continuous <Continuous>  pantoprazole   Suspension 40 milliGRAM(s) Oral before breakfast  vancomycin  IVPB 750 milliGRAM(s) IV Intermittent daily    MEDICATIONS  (PRN):  acetaminophen  Suppository 650 milliGRAM(s) Rectal every 6 hours PRN For Temp greater than 38 C (100.4 F)          Xrays:  Small left apical Pneumothorax, Pull ETT tube 2 cm. Bilateral opacities, right effusion stable

## 2018-04-22 NOTE — PROGRESS NOTE ADULT - ASSESSMENT
IMPRESSION:    AHRF   HCAP  Pulmonary edema   Bilateral pleural effusions.  R > L.  RO parapneumonic effusions VS malignant effusion   MAGDY  Left Iatrogenic Pneumothorax SP left chest tube   HO Colon cancer likely metastatic now     PLAN:    CNS: fentanyl    HEENT: Oral care    PULMONARY:  HOB @ 45 degrees. Keep chest tube to suction ; f/u CT Chest Non Contrast    CARDIOVASCULAR: I>O , c/w LR bolus prn, Start Midodrine 10 mg Q 8hrs, Wean off levophed    GI: GI ppx, OG tube feeds    RENAL:  monitor lytes and replete prn    INFECTIOUS DISEASE: Follow up cultures.  Complete antibiotic course (last day abx 4/23)    HEMATOLOGICAL:  SLIM dvt ppx    ENDOCRINE:  Follow up FS.  Insulin protocol if needed.    Prognosis poor

## 2018-04-22 NOTE — PROGRESS NOTE ADULT - SUBJECTIVE AND OBJECTIVE BOX
Patient is a 68y old Female who presents with a chief complaint of sob, temperature, dec. BP (16 Apr 2018 05:53)    Currently admitted to medicine with the primary diagnosis of Pneumonia    Today is hospital day 6d.    BRIEF HOSPITAL COURSE: 68 Y F [facility] temporary resident for rehab with PMH of anxiety/depression, h/o colon Ca s/p R hemicolectomy not on current treatment, COPD not on home O2 and HTN was brought to the ED to r/o PNA.     As per the patient she has been having productive cough for 2 days, associated with vomiting and diarrhea. She was sent in because a chest xray done at the NH showed r sided effusions.     In the ED Pt was hemodynamically stable but desating down to 89% on nasal canula. She was placed on bipap and is tolerating it well. Her CXR showed bilateral pneumonia and she was given cefepime, azithro and vanco. The patient denies abdominal pain or chest pain. She says her belly is generally distended and her LE edema has improved from before.     Day 1 ICU on rounds the patient was still consistently desaturating and requiring NIV. Anesthesia was called and she was intubated. Thoracentesis was preformed and one liter of fluid was drained from the right pleural cavity.    Day 2 ICU the patient was much more and alert on sedation vacation. Levophed was weened to very low dose. Bronchoscopy was preformed. SBT failed attemp in PM.    Day 3 ICU patient still alert and awake passed SBT today and was extubated at 11am. Passed bedside s&s.     Day 4 Patient required reintubation due to worsening pulmonary edema and respiratory failure. A central line was placed for pressor treatment and the patient unfortunately developed a pneumothorax. A Chest tube was placed and the patient stabilized.     Day 5 the patient is stable with an improving chest xray. Chest tube still producing air bubbles in drainage tank.     Day 6 - Patient continues to be vented. Pneumo was worsening on CXR this morning. CT surg was consulted but the problems was found to be in the chest tube drainage tubing. Problem was fixed and repeat xray shows much improvement.     Day 7 - pt remains on MV, CXR this am w/ small apical ptx, plan for CT chest to better quantify ptx    EVENTS LAST 24HRS: naoe    PAST MEDICAL & SURGICAL HISTORY  Depression, unspecified depression type  Anxiety  Malignant neoplasm of colon, unspecified part of colon  HTN (hypertension)  COPD (chronic obstructive pulmonary disease)      ALLERGIES:    MEDICATIONS:  STANDING MEDICATIONS  cefepime  IVPB 1000 milliGRAM(s) IV Intermittent daily  chlorhexidine 0.12% Liquid 15 milliLiter(s) Swish and Spit two times a day  fentaNYL   Infusion 0.1 MICROgram(s)/kG/Hr IV Continuous <Continuous>  gabapentin 100 milliGRAM(s) Oral three times a day  heparin  Injectable 5000 Unit(s) SubCutaneous every 8 hours  lactated ringers Bolus 1000 milliLiter(s) IV Bolus once  levoFLOXacin IVPB 750 milliGRAM(s) IV Intermittent every 24 hours  midodrine 10 milliGRAM(s) Oral three times a day  norepinephrine Infusion 0.02 MICROgram(s)/kG/Min IV Continuous <Continuous>  pantoprazole   Suspension 40 milliGRAM(s) Oral before breakfast  vancomycin  IVPB 750 milliGRAM(s) IV Intermittent daily    PRN MEDICATIONS  acetaminophen  Suppository 650 milliGRAM(s) Rectal every 6 hours PRN    VITALS:     Mode: AC/ CMV (Assist Control/ Continuous Mandatory Ventilation)  RR (machine): 16  TV (machine): 350  FiO2: 40  PEEP: 5  ITime: 1  MAP: 14  PIP: 32      ICU Vital Signs Last 24 Hrs  T(C): 37.4 (22 Apr 2018 04:00), Max: 37.4 (22 Apr 2018 04:00)  T(F): 99.4 (22 Apr 2018 04:00), Max: 99.4 (22 Apr 2018 04:00)  HR: 101 (22 Apr 2018 09:06) (78 - 116)  BP: 80/57 (22 Apr 2018 07:00) (76/56 - 98/54)  BP(mean): 63 (22 Apr 2018 07:00) (58 - 80)  ABP: --  ABP(mean): --  RR: 8 (22 Apr 2018 07:00) (6 - 40)  SpO2: 100% (22 Apr 2018 09:06) (94% - 100%)      ABG - ( 22 Apr 2018 07:31 )  pH: 7.35  /  pCO2: 48    /  pO2: 84    / HCO3: 26    / Base Excess: 0.2   /  SaO2: 96                  I&O's Detail    21 Apr 2018 07:01  -  22 Apr 2018 07:00  --------------------------------------------------------  IN:    Enteral Tube Flush: 100 mL    fentaNYL  Infusion: 183.7 mL    IV PiggyBack: 450 mL    lactated ringers.: 1900 mL    norepinephrine Infusion: 106.6 mL    Osmolite: 1035 mL  Total IN: 3775.3 mL    OUT:    Chest Tube: 30 mL    Indwelling Catheter - Urethral: 395 mL  Total OUT: 425 mL    Total NET: 3350.3 mL          LABS:                        7.1    9.83  )-----------( 245      ( 22 Apr 2018 04:56 )             21.3     04-22    135  |  100  |  42<H>  ----------------------------<  124<H>  4.8   |  27  |  1.2    Ca    8.0<L>      22 Apr 2018 04:56  Phos  1.6     04-22  Mg     1.7     04-22    TPro  4.6<L>  /  Alb  1.9<L>  /  TBili  0.2  /  DBili  <0.2  /  AST  48<H>  /  ALT  9   /  AlkPhos  212<H>  04-22          CAPILLARY BLOOD GLUCOSE            CULTURES:  Culture Results:   Normal Respiratory Candice present (04-17 @ 13:00)  Culture Results:   No growth at 5 days (04-16 @ 17:03)  Culture Results:   No growth (04-16 @ 04:19)  Culture Results:   No growth at 5 days. (04-15 @ 21:11)      PHYSICAL EXAM:    General: No acute distress.  Alert, oriented, interactive, nonfocal    HEENT: Pupils equal, reactive to light.  Symmetric.    PULM: Clear to auscultation bilaterally, no significant sputum production    CVS: Regular rate and rhythm, no murmurs, rubs, or gallops    ABD: Soft, nondistended, nontender, normoactive bowel sounds, no masses    EXT: No edema, nontender    SKIN: Warm and well perfused, no rashes noted.    RADIOLOGY:

## 2018-04-23 NOTE — PROGRESS NOTE ADULT - SUBJECTIVE AND OBJECTIVE BOX
Patient is a 68y old Female who presents with a chief complaint of sob, temperature, dec. BP (16 Apr 2018 05:53)    Currently admitted to medicine with the primary diagnosis of Pneumonia    Today is hospital day 7d.    BRIEF HOSPITAL COURSE:     68 Y F [facility] temporary resident for rehab with PMH of anxiety/depression, h/o colon Ca s/p R hemicolectomy not on current treatment, COPD not on home O2 and HTN was brought to the ED to r/o PNA.     As per the patient she has been having productive cough for 2 days, associated with vomiting and diarrhea. She was sent in because a chest xray done at the NH showed r sided effusions.     In the ED Pt was hemodynamically stable but desating down to 89% on nasal canula. She was placed on bipap and is tolerating it well. Her CXR showed bilateral pneumonia and she was given cefepime, azithro and vanco. The patient denies abdominal pain or chest pain. She says her belly is generally distended and her LE edema has improved from before.     Day 1 ICU on rounds the patient was still consistently desaturating and requiring NIV. Anesthesia was called and she was intubated. Thoracentesis was preformed and one liter of fluid was drained from the right pleural cavity.    Day 2 ICU the patient was much more and alert on sedation vacation. Levophed was weened to very low dose. Bronchoscopy was preformed. SBT failed attemp in PM.    Day 3 ICU patient still alert and awake passed SBT today and was extubated at 11am. Passed bedside s&s.     Day 4 Patient required reintubation due to worsening pulmonary edema and respiratory failure. A central line was placed for pressor treatment and the patient unfortunately developed a pneumothorax. A Chest tube was placed and the patient stabilized.     Day 5 the patient is stable with an improving chest xray. Chest tube still producing air bubbles in drainage tank.     Day 6 - Patient continues to be vented. Pneumo was worsening on CXR this morning. CT surg was consulted but the problems was found to be in the chest tube drainage tubing. Problem was fixed and repeat xray shows much improvement.     Day 7 - pt remains on MV, CXR this am w/ small apical ptx, plan for CT chest to better quantify ptx      EVENTS LAST 24HRS:     PAST MEDICAL & SURGICAL HISTORY  Depression, unspecified depression type  Anxiety  Malignant neoplasm of colon, unspecified part of colon  HTN (hypertension)  COPD (chronic obstructive pulmonary disease)      SOCIAL HISTORY:      REVIEW OF SYSTEMS:  See HPI    ALLERGIES:    MEDICATIONS:  STANDING MEDICATIONS  cefepime  IVPB 1000 milliGRAM(s) IV Intermittent daily  chlorhexidine 0.12% Liquid 15 milliLiter(s) Swish and Spit two times a day  fentaNYL   Infusion 0.1 MICROgram(s)/kG/Hr IV Continuous <Continuous>  gabapentin 100 milliGRAM(s) Oral three times a day  heparin  Injectable 5000 Unit(s) SubCutaneous every 8 hours  levoFLOXacin IVPB 750 milliGRAM(s) IV Intermittent every 24 hours  midodrine 10 milliGRAM(s) Oral three times a day  norepinephrine Infusion 0.02 MICROgram(s)/kG/Min IV Continuous <Continuous>  pantoprazole   Suspension 40 milliGRAM(s) Oral before breakfast  vancomycin  IVPB 750 milliGRAM(s) IV Intermittent daily    PRN MEDICATIONS  acetaminophen  Suppository 650 milliGRAM(s) Rectal every 6 hours PRN    VITALS:     Mode: AC/ CMV (Assist Control/ Continuous Mandatory Ventilation)  RR (machine): 16  TV (machine): 350  FiO2: 50  PEEP: 5  ITime: 1  MAP: 11  PIP: 26      ICU Vital Signs Last 24 Hrs:    T(C): 37.4 (23 Apr 2018 04:00), Max: 37.6 (22 Apr 2018 20:00)  T(F): 99.4 (23 Apr 2018 04:00), Max: 99.6 (22 Apr 2018 20:00)  HR: 86 (23 Apr 2018 06:00) (86 - 106)  BP: 80/47 (23 Apr 2018 06:00) (75/49 - 115/82)  BP(mean): 56 (23 Apr 2018 06:00) (55 - 102)  ABP: --  ABP(mean): --  RR: 16 (23 Apr 2018 06:00) (14 - 33)  SpO2: 100% (23 Apr 2018 06:00) (97% - 100%)      ABG - ( 22 Apr 2018 14:43 )  pH: 7.33  /  pCO2: 50    /  pO2: 66    / HCO3: 26    / Base Excess: 0.2   /  SaO2: 94                  I&O's Detail:      22 Apr 2018 07:01  -  23 Apr 2018 07:00  --------------------------------------------------------  IN:    Enteral Tube Flush: 120 mL    fentaNYL  Infusion: 99.2 mL    IV PiggyBack: 350 mL    norepinephrine Infusion: 162 mL    Osmolite: 1080 mL  Total IN: 1811.2 mL    OUT:    Chest Tube: 20 mL    Indwelling Catheter - Urethral: 600 mL  Total OUT: 620 mL    Total NET: 1191.2 mL          LABS:                        6.8    18.10 )-----------( 244      ( 23 Apr 2018 04:50 )             21.9     04-23    135  |  99  |  39<H>  ----------------------------<  132<H>  5.3<H>   |  27  |  1.1    Ca    8.0<L>      23 Apr 2018 04:50  Phos  1.6     04-22  Mg     1.7     04-22    TPro  4.6<L>  /  Alb  1.9<L>  /  TBili  0.2  /  DBili  <0.2  /  AST  48<H>  /  ALT  9   /  AlkPhos  212<H>  04-22          CAPILLARY BLOOD GLUCOSE            CULTURES:  Culture Results:   Normal Respiratory Candice present (04-17 @ 13:00)  Culture Results:   No growth at 5 days (04-16 @ 17:03)      PHYSICAL EXAM:    General: No acute distress.  Alert, oriented, interactive, nonfocal.    HEENT: Pupils equal, reactive to light symmetrically.    PULM: Clear to auscultation bilaterally, no significant sputum production.    CVS: Regular rate and rhythm, no murmurs, rubs, or gallops.    ABD: Soft, nondistended, nontender, no masses.    EXT: No edema, nontender.    SKIN: Warm and well perfused, no rashes noted.    RADIOLOGY: Patient is a 68y old Female who presents with a chief complaint of sob, temperature, dec. BP (16 Apr 2018 05:53)    Currently admitted to medicine with the primary diagnosis of Pneumonia    Today is hospital day 7d.    BRIEF HOSPITAL COURSE:     68 Y F [facility] temporary resident for rehab with PMH of anxiety/depression, h/o colon Ca s/p R hemicolectomy not on current treatment, COPD not on home O2 and HTN was brought to the ED to r/o PNA.     As per the patient she has been having productive cough for 2 days, associated with vomiting and diarrhea. She was sent in because a chest xray done at the NH showed r sided effusions.     In the ED Pt was hemodynamically stable but desating down to 89% on nasal canula. She was placed on bipap and is tolerating it well. Her CXR showed bilateral pneumonia and she was given cefepime, azithro and vanco. The patient denies abdominal pain or chest pain. She says her belly is generally distended and her LE edema has improved from before.     Day 1 ICU on rounds the patient was still consistently desaturating and requiring NIV. Anesthesia was called and she was intubated. Thoracentesis was preformed and one liter of fluid was drained from the right pleural cavity.    Day 2 ICU the patient was much more and alert on sedation vacation. Levophed was weened to very low dose. Bronchoscopy was preformed. SBT failed attemp in PM.    Day 3 ICU patient still alert and awake passed SBT today and was extubated at 11am. Passed bedside s&s.     Day 4 Patient required reintubation due to worsening pulmonary edema and respiratory failure. A central line was placed for pressor treatment and the patient unfortunately developed a pneumothorax. A Chest tube was placed and the patient stabilized.     Day 5 the patient is stable with an improving chest xray. Chest tube still producing air bubbles in drainage tank.     Day 6 - Patient continues to be vented. Pneumo was worsening on CXR this morning. CT surg was consulted but the problems was found to be in the chest tube drainage tubing. Problem was fixed and repeat xray shows much improvement.     Day 7 - pt remains on MV, CXR this am w/ small apical ptx, plan for CT chest to better quantify ptx      EVENTS LAST 24HRS:     Patient on sedation vacation, able to follow commands; Plan for bronchoscopy today with possible chest tube removal.     PAST MEDICAL & SURGICAL HISTORY  Depression, unspecified depression type  Anxiety  Malignant neoplasm of colon, unspecified part of colon  HTN (hypertension)  COPD (chronic obstructive pulmonary disease)      SOCIAL HISTORY:      REVIEW OF SYSTEMS:  See HPI    ALLERGIES:    MEDICATIONS:  STANDING MEDICATIONS  cefepime  IVPB 1000 milliGRAM(s) IV Intermittent daily  chlorhexidine 0.12% Liquid 15 milliLiter(s) Swish and Spit two times a day  fentaNYL   Infusion 0.1 MICROgram(s)/kG/Hr IV Continuous <Continuous>  gabapentin 100 milliGRAM(s) Oral three times a day  heparin  Injectable 5000 Unit(s) SubCutaneous every 8 hours  levoFLOXacin IVPB 750 milliGRAM(s) IV Intermittent every 24 hours  midodrine 10 milliGRAM(s) Oral three times a day  norepinephrine Infusion 0.02 MICROgram(s)/kG/Min IV Continuous <Continuous>  pantoprazole   Suspension 40 milliGRAM(s) Oral before breakfast  vancomycin  IVPB 750 milliGRAM(s) IV Intermittent daily    PRN MEDICATIONS  acetaminophen  Suppository 650 milliGRAM(s) Rectal every 6 hours PRN    VITALS:     Mode: AC/ CMV (Assist Control/ Continuous Mandatory Ventilation)  RR (machine): 16  TV (machine): 350  FiO2: 50  PEEP: 5  ITime: 1  MAP: 11  PIP: 26      ICU Vital Signs Last 24 Hrs:    T(C): 37.4 (23 Apr 2018 04:00), Max: 37.6 (22 Apr 2018 20:00)  T(F): 99.4 (23 Apr 2018 04:00), Max: 99.6 (22 Apr 2018 20:00)  HR: 86 (23 Apr 2018 06:00) (86 - 106)  BP: 80/47 (23 Apr 2018 06:00) (75/49 - 115/82)  BP(mean): 56 (23 Apr 2018 06:00) (55 - 102)  ABP: --  ABP(mean): --  RR: 16 (23 Apr 2018 06:00) (14 - 33)  SpO2: 100% (23 Apr 2018 06:00) (97% - 100%)      ABG - ( 22 Apr 2018 14:43 )  pH: 7.33  /  pCO2: 50    /  pO2: 66    / HCO3: 26    / Base Excess: 0.2   /  SaO2: 94                  I&O's Detail:      22 Apr 2018 07:01  -  23 Apr 2018 07:00  --------------------------------------------------------  IN:    Enteral Tube Flush: 120 mL    fentaNYL  Infusion: 99.2 mL    IV PiggyBack: 350 mL    norepinephrine Infusion: 162 mL    Osmolite: 1080 mL  Total IN: 1811.2 mL    OUT:    Chest Tube: 20 mL    Indwelling Catheter - Urethral: 600 mL  Total OUT: 620 mL    Total NET: 1191.2 mL          LABS:                        6.8    18.10 )-----------( 244      ( 23 Apr 2018 04:50 )             21.9     04-23    135  |  99  |  39<H>  ----------------------------<  132<H>  5.3<H>   |  27  |  1.1    Ca    8.0<L>      23 Apr 2018 04:50  Phos  1.6     04-22  Mg     1.7     04-22    TPro  4.6<L>  /  Alb  1.9<L>  /  TBili  0.2  /  DBili  <0.2  /  AST  48<H>  /  ALT  9   /  AlkPhos  212<H>  04-22          CAPILLARY BLOOD GLUCOSE            CULTURES:  Culture Results:   Normal Respiratory Candice present (04-17 @ 13:00)  Culture Results:   No growth at 5 days (04-16 @ 17:03)      PHYSICAL EXAM:    General: No acute distress.  Alert, oriented, interactive, nonfocal.    HEENT: Pupils equal, reactive to light symmetrically.    PULM: Clear to auscultation bilaterally, no significant sputum production.    CVS: Regular rate and rhythm, no murmurs, rubs, or gallops.    ABD: Soft, nondistended, nontender, no masses.    EXT: No edema, nontender.    SKIN: Warm and well perfused, no rashes noted.    RADIOLOGY: Patient is a 68y old Female who presents with a chief complaint of sob, temperature, dec. BP (16 Apr 2018 05:53)    Currently admitted to medicine with the primary diagnosis of Pneumonia    Today is hospital day 7d.    BRIEF HOSPITAL COURSE:     68 Y F [facility] temporary resident for rehab with PMH of anxiety/depression, h/o colon Ca s/p R hemicolectomy not on current treatment, COPD not on home O2 and HTN was brought to the ED to r/o PNA.     As per the patient she has been having productive cough for 2 days, associated with vomiting and diarrhea. She was sent in because a chest xray done at the NH showed r sided effusions.     In the ED Pt was hemodynamically stable but desating down to 89% on nasal canula. She was placed on bipap and is tolerating it well. Her CXR showed bilateral pneumonia and she was given cefepime, azithro and vanco. The patient denies abdominal pain or chest pain. She says her belly is generally distended and her LE edema has improved from before.       EVENTS LAST 24HRS:     Patient on sedation vacation, able to follow commands; Plan for bronchoscopy today with possible chest tube removal. Last day of antibiotic course today     PAST MEDICAL & SURGICAL HISTORY  Depression, unspecified depression type  Anxiety  Malignant neoplasm of colon, unspecified part of colon  HTN (hypertension)  COPD (chronic obstructive pulmonary disease)      SOCIAL HISTORY:      REVIEW OF SYSTEMS:  See HPI    ALLERGIES:    MEDICATIONS:  STANDING MEDICATIONS  cefepime  IVPB 1000 milliGRAM(s) IV Intermittent daily  chlorhexidine 0.12% Liquid 15 milliLiter(s) Swish and Spit two times a day  fentaNYL   Infusion 0.1 MICROgram(s)/kG/Hr IV Continuous <Continuous>  gabapentin 100 milliGRAM(s) Oral three times a day  heparin  Injectable 5000 Unit(s) SubCutaneous every 8 hours  levoFLOXacin IVPB 750 milliGRAM(s) IV Intermittent every 24 hours  midodrine 10 milliGRAM(s) Oral three times a day  norepinephrine Infusion 0.02 MICROgram(s)/kG/Min IV Continuous <Continuous>  pantoprazole   Suspension 40 milliGRAM(s) Oral before breakfast  vancomycin  IVPB 750 milliGRAM(s) IV Intermittent daily    PRN MEDICATIONS  acetaminophen  Suppository 650 milliGRAM(s) Rectal every 6 hours PRN    VITALS:     Mode: AC/ CMV (Assist Control/ Continuous Mandatory Ventilation)  RR (machine): 16  TV (machine): 350  FiO2: 50  PEEP: 5  ITime: 1  MAP: 11  PIP: 26      ICU Vital Signs Last 24 Hrs:    T(C): 37.4 (23 Apr 2018 04:00), Max: 37.6 (22 Apr 2018 20:00)  T(F): 99.4 (23 Apr 2018 04:00), Max: 99.6 (22 Apr 2018 20:00)  HR: 86 (23 Apr 2018 06:00) (86 - 106)  BP: 80/47 (23 Apr 2018 06:00) (75/49 - 115/82)  BP(mean): 56 (23 Apr 2018 06:00) (55 - 102)  ABP: --  ABP(mean): --  RR: 16 (23 Apr 2018 06:00) (14 - 33)  SpO2: 100% (23 Apr 2018 06:00) (97% - 100%)      ABG - ( 22 Apr 2018 14:43 )  pH: 7.33  /  pCO2: 50    /  pO2: 66    / HCO3: 26    / Base Excess: 0.2   /  SaO2: 94                  I&O's Detail:      22 Apr 2018 07:01  -  23 Apr 2018 07:00  --------------------------------------------------------  IN:    Enteral Tube Flush: 120 mL    fentaNYL  Infusion: 99.2 mL    IV PiggyBack: 350 mL    norepinephrine Infusion: 162 mL    Osmolite: 1080 mL  Total IN: 1811.2 mL    OUT:    Chest Tube: 20 mL    Indwelling Catheter - Urethral: 600 mL  Total OUT: 620 mL    Total NET: 1191.2 mL          LABS:                        6.8    18.10 )-----------( 244      ( 23 Apr 2018 04:50 )             21.9     04-23    135  |  99  |  39<H>  ----------------------------<  132<H>  5.3<H>   |  27  |  1.1    Ca    8.0<L>      23 Apr 2018 04:50  Phos  1.6     04-22  Mg     1.7     04-22    TPro  4.6<L>  /  Alb  1.9<L>  /  TBili  0.2  /  DBili  <0.2  /  AST  48<H>  /  ALT  9   /  AlkPhos  212<H>  04-22          CAPILLARY BLOOD GLUCOSE            CULTURES:  Culture Results:   Normal Respiratory Candice present (04-17 @ 13:00)  Culture Results:   No growth at 5 days (04-16 @ 17:03)      PHYSICAL EXAM:    General: No acute distress.  Alert, oriented, interactive, nonfocal.    HEENT: Pupils equal, reactive to light symmetrically.    PULM: Clear to auscultation bilaterally, no significant sputum production.    CVS: Regular rate and rhythm, no murmurs, rubs, or gallops.    ABD: Soft, nondistended, nontender, no masses.    EXT: No edema, nontender.    SKIN: Warm and well perfused, no rashes noted.    RADIOLOGY: Patient is a 68y old Female who presents with a chief complaint of sob, temperature, dec. BP (16 Apr 2018 05:53)    Currently admitted to medicine with the primary diagnosis of Pneumonia    Today is hospital day 7d.    BRIEF HOSPITAL COURSE:     68 Y F [facility] temporary resident for rehab with PMH of anxiety/depression, h/o colon Ca s/p R hemicolectomy not on current treatment, COPD not on home O2 and HTN was brought to the ED to r/o PNA.     As per the patient she has been having productive cough for 2 days, associated with vomiting and diarrhea. She was sent in because a chest xray done at the NH showed r sided effusions.     In the ED Pt was hemodynamically stable but desating down to 89% on nasal canula. She was placed on bipap and is tolerating it well. Her CXR showed bilateral pneumonia and she was given cefepime, azithro and vanco. The patient denies abdominal pain or chest pain. She says her belly is generally distended and her LE edema has improved from before.       EVENTS LAST 24HRS:     Patient on sedation vacation, able to follow commands; Plan for bronchoscopy today with possible chest tube removal. Antibiotic course complete    PAST MEDICAL & SURGICAL HISTORY  Depression, unspecified depression type  Anxiety  Malignant neoplasm of colon, unspecified part of colon  HTN (hypertension)  COPD (chronic obstructive pulmonary disease)      SOCIAL HISTORY:      REVIEW OF SYSTEMS:  See HPI    ALLERGIES:    MEDICATIONS:  STANDING MEDICATIONS  cefepime  IVPB 1000 milliGRAM(s) IV Intermittent daily  chlorhexidine 0.12% Liquid 15 milliLiter(s) Swish and Spit two times a day  fentaNYL   Infusion 0.1 MICROgram(s)/kG/Hr IV Continuous <Continuous>  gabapentin 100 milliGRAM(s) Oral three times a day  heparin  Injectable 5000 Unit(s) SubCutaneous every 8 hours  levoFLOXacin IVPB 750 milliGRAM(s) IV Intermittent every 24 hours  midodrine 10 milliGRAM(s) Oral three times a day  norepinephrine Infusion 0.02 MICROgram(s)/kG/Min IV Continuous <Continuous>  pantoprazole   Suspension 40 milliGRAM(s) Oral before breakfast  vancomycin  IVPB 750 milliGRAM(s) IV Intermittent daily    PRN MEDICATIONS  acetaminophen  Suppository 650 milliGRAM(s) Rectal every 6 hours PRN    VITALS:     Mode: AC/ CMV (Assist Control/ Continuous Mandatory Ventilation)  RR (machine): 16  TV (machine): 350  FiO2: 50  PEEP: 5  ITime: 1  MAP: 11  PIP: 26      ICU Vital Signs Last 24 Hrs:    T(C): 37.4 (23 Apr 2018 04:00), Max: 37.6 (22 Apr 2018 20:00)  T(F): 99.4 (23 Apr 2018 04:00), Max: 99.6 (22 Apr 2018 20:00)  HR: 86 (23 Apr 2018 06:00) (86 - 106)  BP: 80/47 (23 Apr 2018 06:00) (75/49 - 115/82)  BP(mean): 56 (23 Apr 2018 06:00) (55 - 102)  ABP: --  ABP(mean): --  RR: 16 (23 Apr 2018 06:00) (14 - 33)  SpO2: 100% (23 Apr 2018 06:00) (97% - 100%)      ABG - ( 22 Apr 2018 14:43 )  pH: 7.33  /  pCO2: 50    /  pO2: 66    / HCO3: 26    / Base Excess: 0.2   /  SaO2: 94                  I&O's Detail:      22 Apr 2018 07:01  -  23 Apr 2018 07:00  --------------------------------------------------------  IN:    Enteral Tube Flush: 120 mL    fentaNYL  Infusion: 99.2 mL    IV PiggyBack: 350 mL    norepinephrine Infusion: 162 mL    Osmolite: 1080 mL  Total IN: 1811.2 mL    OUT:    Chest Tube: 20 mL    Indwelling Catheter - Urethral: 600 mL  Total OUT: 620 mL    Total NET: 1191.2 mL          LABS:                        6.8    18.10 )-----------( 244      ( 23 Apr 2018 04:50 )             21.9     04-23    135  |  99  |  39<H>  ----------------------------<  132<H>  5.3<H>   |  27  |  1.1    Ca    8.0<L>      23 Apr 2018 04:50  Phos  1.6     04-22  Mg     1.7     04-22    TPro  4.6<L>  /  Alb  1.9<L>  /  TBili  0.2  /  DBili  <0.2  /  AST  48<H>  /  ALT  9   /  AlkPhos  212<H>  04-22          CAPILLARY BLOOD GLUCOSE            CULTURES:  Culture Results:   Normal Respiratory Candice present (04-17 @ 13:00)  Culture Results:   No growth at 5 days (04-16 @ 17:03)      PHYSICAL EXAM:    General: No acute distress.  Alert, oriented, interactive, nonfocal.    HEENT: Pupils equal, reactive to light symmetrically.    PULM: Clear to auscultation bilaterally, no significant sputum production.    CVS: Regular rate and rhythm, no murmurs, rubs, or gallops.    ABD: Soft, nondistended, nontender, no masses.    EXT: No edema, nontender.    SKIN: Warm and well perfused, no rashes noted.    RADIOLOGY: Patient is a 68y old Female who presents with a chief complaint of sob, temperature, dec. BP (16 Apr 2018 05:53)    Currently admitted to medicine with the primary diagnosis of Pneumonia    Today is hospital day 7d.    BRIEF HOSPITAL COURSE:     68 Y F [facility] temporary resident for rehab with PMH of anxiety/depression, h/o colon Ca s/p R hemicolectomy not on current treatment, COPD not on home O2 and HTN was brought to the ED to r/o PNA.     As per the patient she has been having productive cough for 2 days, associated with vomiting and diarrhea. She was sent in because a chest xray done at the NH showed r sided effusions.     In the ED Pt was hemodynamically stable but desating down to 89% on nasal canula. She was placed on bipap and is tolerating it well. Her CXR showed bilateral pneumonia and she was given cefepime, azithro and vanco. The patient denies abdominal pain or chest pain. She says her belly is generally distended and her LE edema has improved from before.       EVENTS LAST 24HRS:     Patient on sedation vacation, able to follow commands; Plan for bronchoscopy today with possible chest tube removal. Antibiotic course complete. Hemoglobin this AM 6.8, to receive one unit pRBC.      PAST MEDICAL & SURGICAL HISTORY  Depression, unspecified depression type  Anxiety  Malignant neoplasm of colon, unspecified part of colon  HTN (hypertension)  COPD (chronic obstructive pulmonary disease)      SOCIAL HISTORY:      REVIEW OF SYSTEMS:  See HPI    ALLERGIES:    MEDICATIONS:  STANDING MEDICATIONS  cefepime  IVPB 1000 milliGRAM(s) IV Intermittent daily  chlorhexidine 0.12% Liquid 15 milliLiter(s) Swish and Spit two times a day  fentaNYL   Infusion 0.1 MICROgram(s)/kG/Hr IV Continuous <Continuous>  gabapentin 100 milliGRAM(s) Oral three times a day  heparin  Injectable 5000 Unit(s) SubCutaneous every 8 hours  levoFLOXacin IVPB 750 milliGRAM(s) IV Intermittent every 24 hours  midodrine 10 milliGRAM(s) Oral three times a day  norepinephrine Infusion 0.02 MICROgram(s)/kG/Min IV Continuous <Continuous>  pantoprazole   Suspension 40 milliGRAM(s) Oral before breakfast  vancomycin  IVPB 750 milliGRAM(s) IV Intermittent daily    PRN MEDICATIONS  acetaminophen  Suppository 650 milliGRAM(s) Rectal every 6 hours PRN    VITALS:     Mode: AC/ CMV (Assist Control/ Continuous Mandatory Ventilation)  RR (machine): 16  TV (machine): 350  FiO2: 50  PEEP: 5  ITime: 1  MAP: 11  PIP: 26      ICU Vital Signs Last 24 Hrs:    T(C): 37.4 (23 Apr 2018 04:00), Max: 37.6 (22 Apr 2018 20:00)  T(F): 99.4 (23 Apr 2018 04:00), Max: 99.6 (22 Apr 2018 20:00)  HR: 86 (23 Apr 2018 06:00) (86 - 106)  BP: 80/47 (23 Apr 2018 06:00) (75/49 - 115/82)  BP(mean): 56 (23 Apr 2018 06:00) (55 - 102)  ABP: --  ABP(mean): --  RR: 16 (23 Apr 2018 06:00) (14 - 33)  SpO2: 100% (23 Apr 2018 06:00) (97% - 100%)      ABG - ( 22 Apr 2018 14:43 )  pH: 7.33  /  pCO2: 50    /  pO2: 66    / HCO3: 26    / Base Excess: 0.2   /  SaO2: 94          I&O's Detail:      22 Apr 2018 07:01  -  23 Apr 2018 07:00  --------------------------------------------------------  IN:    Enteral Tube Flush: 120 mL    fentaNYL  Infusion: 99.2 mL    IV PiggyBack: 350 mL    norepinephrine Infusion: 162 mL    Osmolite: 1080 mL  Total IN: 1811.2 mL    OUT:    Chest Tube: 20 mL    Indwelling Catheter - Urethral: 600 mL  Total OUT: 620 mL    Total NET: 1191.2 mL      LABS:                        6.8    18.10 )-----------( 244      ( 23 Apr 2018 04:50 )             21.9     04-23    135  |  99  |  39<H>  ----------------------------<  132<H>  5.3<H>   |  27  |  1.1    Ca    8.0<L>      23 Apr 2018 04:50  Phos  1.6     04-22  Mg     1.7     04-22    TPro  4.6<L>  /  Alb  1.9<L>  /  TBili  0.2  /  DBili  <0.2  /  AST  48<H>  /  ALT  9   /  AlkPhos  212<H>  04-22          CAPILLARY BLOOD GLUCOSE      CULTURES:  Culture Results:   Normal Respiratory Candice present (04-17 @ 13:00)  Culture Results:   No growth at 5 days (04-16 @ 17:03)      PHYSICAL EXAM:    General: No acute distress.  Alert, oriented, interactive, nonfocal.    HEENT: Pupils equal, reactive to light symmetrically.    PULM: Clear to auscultation bilaterally, no significant sputum production.    CVS: Regular rate and rhythm, no murmurs, rubs, or gallops.    ABD: Soft, nondistended, nontender, no masses.    EXT: No edema, nontender.    SKIN: Warm and well perfused, no rashes noted.    RADIOLOGY:       Assessment and Plan:   · Assessment		  IMPRESSION:    AHRF   HCAP  Pulmonary edema   Bilateral pleural effusions.  R > L.  RO parapneumonic effusions VS malignant effusion   MAGDY  Left Iatrogenic Pneumothorax SP left chest tube   HO Colon cancer likely metastatic now     PLAN:    CNS: fentanyl    HEENT: Oral care    PULMONARY:  HOB @ 45 degrees. Keep chest tube to suction ; Bronchoscopy today with possible L sided chest tube removal     CARDIOVASCULAR: I>O Cont Midodrine 10 mg Q 8hrs, Wean off levophed    GI: GI ppx, OG tube feeds    RENAL:  monitor lytes and replete prn    INFECTIOUS DISEASE: Follow up cultures.  Completed antibiotic course     HEMATOLOGICAL:  SLIM dvt ppx; Patient hemoglobin steadily downtrending, today 6.8 to receive one unit; Dr. Schultz, hematology oncology consult pending due to CT Abdomen/Pelvis findings of hepatic and pancreatic carcinomatosis     ENDOCRINE:  Follow up FS.  Insulin protocol if needed.    Prognosis poor Patient is a 68y old Female who presents with a chief complaint of sob, temperature, dec. BP (16 Apr 2018 05:53)    Currently admitted to medicine with the primary diagnosis of Pneumonia    Today is hospital day 7d.    BRIEF HOSPITAL COURSE:     68 Y F [facility] temporary resident for rehab with PMH of anxiety/depression, h/o colon Ca s/p R hemicolectomy not on current treatment, COPD not on home O2 and HTN was brought to the ED to r/o PNA.     As per the patient she has been having productive cough for 2 days, associated with vomiting and diarrhea. She was sent in because a chest xray done at the NH showed r sided effusions.     In the ED Pt was hemodynamically stable but desating down to 89% on nasal canula. She was placed on bipap and is tolerating it well. Her CXR showed bilateral pneumonia and she was given cefepime, azithro and vanco. The patient denies abdominal pain or chest pain. She says her belly is generally distended and her LE edema has improved from before.       EVENTS LAST 24HRS:     Patient on sedation vacation, able to follow commands; Plan for bronchoscopy today with possible chest tube removal. Antibiotic course complete. Hemoglobin this AM 6.8, to receive one unit pRBC.      PAST MEDICAL & SURGICAL HISTORY  Depression, unspecified depression type  Anxiety  Malignant neoplasm of colon, unspecified part of colon  HTN (hypertension)  COPD (chronic obstructive pulmonary disease)      SOCIAL HISTORY:      REVIEW OF SYSTEMS:  See HPI    ALLERGIES:    MEDICATIONS:  STANDING MEDICATIONS  cefepime  IVPB 1000 milliGRAM(s) IV Intermittent daily  chlorhexidine 0.12% Liquid 15 milliLiter(s) Swish and Spit two times a day  fentaNYL   Infusion 0.1 MICROgram(s)/kG/Hr IV Continuous <Continuous>  gabapentin 100 milliGRAM(s) Oral three times a day  heparin  Injectable 5000 Unit(s) SubCutaneous every 8 hours  levoFLOXacin IVPB 750 milliGRAM(s) IV Intermittent every 24 hours  midodrine 10 milliGRAM(s) Oral three times a day  norepinephrine Infusion 0.02 MICROgram(s)/kG/Min IV Continuous <Continuous>  pantoprazole   Suspension 40 milliGRAM(s) Oral before breakfast  vancomycin  IVPB 750 milliGRAM(s) IV Intermittent daily    PRN MEDICATIONS  acetaminophen  Suppository 650 milliGRAM(s) Rectal every 6 hours PRN    VITALS:     Mode: AC/ CMV (Assist Control/ Continuous Mandatory Ventilation)  RR (machine): 16  TV (machine): 350  FiO2: 50  PEEP: 5  ITime: 1  MAP: 11  PIP: 26      ICU Vital Signs Last 24 Hrs:    T(C): 37.4 (23 Apr 2018 04:00), Max: 37.6 (22 Apr 2018 20:00)  T(F): 99.4 (23 Apr 2018 04:00), Max: 99.6 (22 Apr 2018 20:00)  HR: 86 (23 Apr 2018 06:00) (86 - 106)  BP: 80/47 (23 Apr 2018 06:00) (75/49 - 115/82)  BP(mean): 56 (23 Apr 2018 06:00) (55 - 102)  ABP: --  ABP(mean): --  RR: 16 (23 Apr 2018 06:00) (14 - 33)  SpO2: 100% (23 Apr 2018 06:00) (97% - 100%)      ABG - ( 22 Apr 2018 14:43 )  pH: 7.33  /  pCO2: 50    /  pO2: 66    / HCO3: 26    / Base Excess: 0.2   /  SaO2: 94          I&O's Detail:      22 Apr 2018 07:01  -  23 Apr 2018 07:00  --------------------------------------------------------  IN:    Enteral Tube Flush: 120 mL    fentaNYL  Infusion: 99.2 mL    IV PiggyBack: 350 mL    norepinephrine Infusion: 162 mL    Osmolite: 1080 mL  Total IN: 1811.2 mL    OUT:    Chest Tube: 20 mL    Indwelling Catheter - Urethral: 600 mL  Total OUT: 620 mL    Total NET: 1191.2 mL      LABS:                        6.8    18.10 )-----------( 244      ( 23 Apr 2018 04:50 )             21.9     04-23    135  |  99  |  39<H>  ----------------------------<  132<H>  5.3<H>   |  27  |  1.1    Ca    8.0<L>      23 Apr 2018 04:50  Phos  1.6     04-22  Mg     1.7     04-22    TPro  4.6<L>  /  Alb  1.9<L>  /  TBili  0.2  /  DBili  <0.2  /  AST  48<H>  /  ALT  9   /  AlkPhos  212<H>  04-22          CAPILLARY BLOOD GLUCOSE      CULTURES:  Culture Results:   Normal Respiratory Candice present (04-17 @ 13:00)  Culture Results:   No growth at 5 days (04-16 @ 17:03)      PHYSICAL EXAM:    General: No acute distress.  Alert, oriented, interactive, nonfocal.    HEENT: Pupils equal, reactive to light symmetrically.    PULM: Clear to auscultation bilaterally, no significant sputum production.    CVS: Regular rate and rhythm, no murmurs, rubs, or gallops.    ABD: Soft, nondistended, nontender, no masses.    EXT: No edema, nontender.    SKIN: Warm and well perfused, no rashes noted.    RADIOLOGY:     < from: CT Abdomen and Pelvis w/ IV Cont (04.15.18 @ 23:05) >  1.  No central or segmental pulmonary emboli.    2.  Small left and large right pleural effusions with associated   compressive atelectasis.    3.  Patchy groundglass opacities predominantly within the upper lobes   which may be on the basis of edema or infection. Correlate clinically for   pneumonia.    4.  Moderate volume abdominopelvic ascites. Hepatic and pancreatic mass   is carcinomatosis retroperitoneum/mesentery nodules.    5.  Age-indeterminate L2 compression fracture.    < end of copied text >      Assessment and Plan:   · Assessment		  IMPRESSION:    AHRF   HCAP  Pulmonary edema   Bilateral pleural effusions.  R > L.  RO parapneumonic effusions VS malignant effusion   MAGDY  Left Iatrogenic Pneumothorax SP left chest tube   HO Colon cancer likely metastatic now     PLAN:    CNS: fentanyl    HEENT: Oral care    PULMONARY:  HOB @ 45 degrees. Keep chest tube to suction ; Bronchoscopy today with possible L sided chest tube removal     CARDIOVASCULAR: I>O Cont Midodrine 10 mg Q 8hrs, Wean off levophed    GI: GI ppx, OG tube feeds    RENAL:  monitor lytes and replete prn    INFECTIOUS DISEASE: Follow up cultures.  Completed antibiotic course     HEMATOLOGICAL:  SLIM dvt ppx; Patient hemoglobin steadily downtrending, today 6.8 to receive one unit; Dr. Schultz, hematology oncology consult pending due to CT Abdomen/Pelvis findings of hepatic and pancreatic carcinomatosis (official report above)     ENDOCRINE:  Follow up FS.  Insulin protocol if needed.    Prognosis poor

## 2018-04-23 NOTE — PROGRESS NOTE ADULT - ASSESSMENT
68F IATROGENIC LEFT PNEUMOTHORAX AFTER CENTRAL LINE LEFT SIDE    PLAN:  - PIGTAIL MANAGEMENT BY PULM TEAM, NO AIR LEAK. POSSIBLE REMOVAL BY PULM 68F IATROGENIC LEFT PNEUMOTHORAX AFTER CENTRAL LINE LEFT SIDE    PLAN:  - PIGTAIL MANAGEMENT BY PULM TEAM, NO AIR LEAK. POSSIBLE REMOVAL BY PULM    CTS ATTENDING    NO AIR LEAK  PATIENT EXPECTED TO REMAIN ON VENT  MAY REMOVE TUBE AT THE DISCRETION OF PULMONARY TEAM

## 2018-04-23 NOTE — PROGRESS NOTE ADULT - SUBJECTIVE AND OBJECTIVE BOX
GENERAL SURGERY PROGRESS NOTE    Patient: CHRISTAL DANIELSON , 68y ([birth year])Female   MRN: [id number]  Location: [facility]  A  Visit: 04-16-18 Inpatient  Date: 04-23-18 @ 07:40    Hospital Day #:7    Procedure/Dx/Injuries: IATROGENIC LEFT PNEUMOTHORAX AFTER CENTRAL LINE LEFT SIDE    Events of past 24 hours: left pigtail cath remains on suction, no air leak    PAST MEDICAL & SURGICAL HISTORY:  Depression, unspecified depression type  Anxiety  Malignant neoplasm of colon, unspecified part of colon  HTN (hypertension)  COPD (chronic obstructive pulmonary disease)    Vitals: T(F): 99.4 (04-23-18 @ 04:00), Max: 99.6 (04-22-18 @ 20:00)  HR: 88 (04-23-18 @ 07:00)  BP: 83/51 (04-23-18 @ 07:00)  RR: 19 (04-23-18 @ 07:00)  SpO2: 100% (04-23-18 @ 07:00)  Mode: AC/ CMV (Assist Control/ Continuous Mandatory Ventilation), RR (machine): 16, TV (machine): 350, FiO2: 50, PEEP: 5, ITime: 1, MAP: 11, PIP: 26    Pain (0-10):            Pain Control Adequate: [] YES [] N    Diet, NPO with Tube Feed:   Tube Feeding Modality: Orogastric  Osmolite 1.5 Lawrence  Total Volume for 24 Hours (mL): 1080  Continuous  Starting Tube Feed Rate mL per Hour: 15  Increase Tube Feed Rate by (mL): 10     Every 3 hours  Until Goal Tube Feed Rate (mL per Hour): 45  Tube Feed Duration (in Hours): 24  Tube Feed Start Time: 20:00    Fluids: lactated ringers Bolus:   1000 milliLiter(s), IV Bolus, once, infuse over 30 Minute(s), Stop After 1 Doses  Provider's Contact #: [phone]      I & O's:    04-22-18 @ 07:01  -  04-23-18 @ 07:00  --------------------------------------------------------  IN:    Enteral Tube Flush: 120 mL    fentaNYL  Infusion: 99.2 mL    IV PiggyBack: 500 mL    norepinephrine Infusion: 12 mL    Osmolite: 1080 mL  Total IN: 1811.2 mL    OUT:    Chest Tube: 20 mL    Indwelling Catheter - Urethral: 600 mL  Total OUT: 620 mL    Total NET: 1191.2 mL    PHYSICAL EXAM  GEN: sedated and vented  CV/ LUNGS: decreased sounds bilaterally, left pigtail cath in place to suction, no air leak, minimal output.  INCISION: C/D/I    MEDICATIONS  (STANDING):  cefepime  IVPB 1000 milliGRAM(s) IV Intermittent daily  chlorhexidine 0.12% Liquid 15 milliLiter(s) Swish and Spit two times a day  fentaNYL   Infusion 0.1 MICROgram(s)/kG/Hr (0.616 mL/Hr) IV Continuous <Continuous>  gabapentin 100 milliGRAM(s) Oral three times a day  heparin  Injectable 5000 Unit(s) SubCutaneous every 8 hours  levoFLOXacin IVPB 750 milliGRAM(s) IV Intermittent every 24 hours  midodrine 10 milliGRAM(s) Oral three times a day  norepinephrine Infusion 0.02 MICROgram(s)/kG/Min (2.31 mL/Hr) IV Continuous <Continuous>  pantoprazole   Suspension 40 milliGRAM(s) Oral before breakfast  vancomycin  IVPB 750 milliGRAM(s) IV Intermittent daily    MEDICATIONS  (PRN):  acetaminophen  Suppository 650 milliGRAM(s) Rectal every 6 hours PRN For Temp greater than 38 C (100.4 F)    DVT PROPHYLAXIS: [X] YES [] NO   GI PROPHYLAXIS: [X] YES [] NO   ANTICOAGULATION: [] YES [X] NO   ANTIBIOTICS: [X] YES [] NO cefepime  IVPB 1000 milliGRAM(s)  levoFLOXacin IVPB 750 milliGRAM(s)  vancomycin  IVPB 750 milliGRAM(s)    LAB/STUDIES:  CAPILLARY BLOOD GLUCOSE               6.8    18.10 )-----------( 244      ( 23 Apr 2018 04:50 )             21.9     04-23    135  |  99  |  39<H>  ----------------------------<  132<H>  5.3<H>   |  27  |  1.1    Ca    8.0<L>      23 Apr 2018 04:50  Phos  1.6     04-22  Mg     1.7     04-22    TPro  4.6<L>  /  Alb  1.9<L>  /  TBili  0.2  /  DBili  <0.2  /  AST  48<H>  /  ALT  9   /  AlkPhos  212<H>  04-22               4.6  | 0.2  | 48       ------------------[212     ( 22 Apr 2018 04:56 )  1.9  | <0.2 | 9           ABG - ( 22 Apr 2018 14:43 )  pH: 7.33  /  pCO2: 50    /  pO2: 66    / HCO3: 26    / Base Excess: 0.2   /  SaO2: 94

## 2018-04-24 NOTE — PROGRESS NOTE ADULT - ASSESSMENT
Patient is a 68y old Female who presents with a chief complaint of sob, temperature, dec. BP (16 Apr 2018 05:53)    Currently admitted to medicine with the primary diagnosis of Pneumonia    Today is hospital day 8d.    BRIEF HOSPITAL COURSE:       68 Y F [facility] temporary resident for rehab with PMH of anxiety/depression, h/o colon Ca s/p R hemicolectomy not on current treatment, COPD not on home O2 and HTN was brought to the ED to r/o PNA. Developed acute hypoxic respiratory failure requiring intubation. Patient found to have pleural effusion R>L s/p thoracocentesis with removal of 1L of fluid. Was extubated but subsequently re-intubated within 24 hours. After re-intubation, she was hypotensive requiring pressors. During central line placement in left IJ, patient developed iatrogenic pneumothorax of left lung with subsequent chest tube insertion.     EVENTS LAST 24HRS:     Remains on pressors.  Abdomen distended and hard to touch. Has not had a bowel movement. Started on bowel regiment. Began to vomit. RN removed OG Tube with attempt to re-insert but patient with strong gag reflex so awaiting for appropriate sedation until re-attempt made by RN.     PAST MEDICAL & SURGICAL HISTORY  Depression, unspecified depression type  Anxiety  Malignant neoplasm of colon, unspecified part of colon  HTN (hypertension)  COPD (chronic obstructive pulmonary disease)      SOCIAL HISTORY:      REVIEW OF SYSTEMS:  See HPI    ALLERGIES:    MEDICATIONS:  STANDING MEDICATIONS  chlorhexidine 0.12% Liquid 15 milliLiter(s) Swish and Spit two times a day  fentaNYL   Infusion 0.1 MICROgram(s)/kG/Hr IV Continuous <Continuous>  gabapentin 100 milliGRAM(s) Oral three times a day  heparin  Injectable 5000 Unit(s) SubCutaneous every 8 hours  lactulose Syrup 10 Gram(s) Oral three times a day  midodrine 10 milliGRAM(s) Oral three times a day  norepinephrine Infusion 0.02 MICROgram(s)/kG/Min IV Continuous <Continuous>  pantoprazole   Suspension 40 milliGRAM(s) Oral before breakfast  polyethylene glycol 3350 17 Gram(s) Oral daily  sodium chloride 0.9% Bolus 500 milliLiter(s) IV Bolus once  sodium chloride 0.9% Bolus 500 milliLiter(s) IV Bolus once    PRN MEDICATIONS  acetaminophen  Suppository 650 milliGRAM(s) Rectal every 6 hours PRN    VITALS:     Mode: AC/ CMV (Assist Control/ Continuous Mandatory Ventilation)  RR (machine): 16  TV (machine): 350  FiO2: 50  PEEP: 5  ITime: 1  MAP: 12  PIP: 30      ICU Vital Signs Last 24 Hrs:    T(C): 36.6 (24 Apr 2018 12:00), Max: 37.3 (24 Apr 2018 00:00)  T(F): 97.8 (24 Apr 2018 12:00), Max: 99.2 (24 Apr 2018 00:00)  HR: 112 (24 Apr 2018 18:30) (80 - 122)  BP: 92/57 (24 Apr 2018 18:30) (75/54 - 133/87)  BP(mean): 65 (24 Apr 2018 18:30) (58 - 104)  ABP: --  ABP(mean): --  RR: 22 (24 Apr 2018 18:30) (12 - 45)  SpO2: 100% (24 Apr 2018 18:30) (88% - 100%)      ABG - ( 24 Apr 2018 18:08 )  pH, Arterial: 7.38  pH, Blood: x     /  pCO2: 44    /  pO2: 65    / HCO3: 26    / Base Excess: 0.7   /  SaO2: 94                  I&O's Detail:      23 Apr 2018 07:01  -  24 Apr 2018 07:00  --------------------------------------------------------  IN:    fentaNYL  Infusion: 219.2 mL    Osmolite: 810 mL    Packed Red Blood Cells: 321 mL    Sodium Chloride 0.9% IV Bolus: 1000 mL  Total IN: 2350.2 mL    OUT:    Chest Tube: 20 mL    Indwelling Catheter - Urethral: 265 mL  Total OUT: 285 mL    Total NET: 2065.2 mL      24 Apr 2018 07:01  -  24 Apr 2018 19:58  --------------------------------------------------------  IN:    fentaNYL  Infusion: 67.7 mL    Osmolite: 180 mL  Total IN: 247.7 mL    OUT:    Indwelling Catheter - Urethral: 105 mL  Total OUT: 105 mL    Total NET: 142.7 mL          LABS:                        8.7    24.64 )-----------( 237      ( 24 Apr 2018 05:01 )             26.7     04-24    133<L>  |  99  |  42<H>  ----------------------------<  121<H>  5.4<H>   |  27  |  1.1    Ca    8.1<L>      24 Apr 2018 05:01            CAPILLARY BLOOD GLUCOSE            CULTURES:      PHYSICAL EXAM:    General: No acute distress.  Alert, oriented, interactive, nonfocal.    HEENT: Pupils equal, reactive to light symmetrically.    PULM: Clear to auscultation bilaterally, no significant sputum production.    CVS: Regular rate and rhythm, no murmurs, rubs, or gallops.    ABD: Soft, nondistended, nontender, no masses.    EXT: Diffuse anasarca with palpable subcutaneous emphysema     SKIN: Warm and well perfused, no rashes noted.    RADIOLOGY:     CNS: fentanyl    HEENT: Oral care    PULMONARY:  HOB @ 45 degrees. Keep chest tube to suction ; f/u CT Chest Non Contrast    CARDIOVASCULAR: I>O , c/w LR bolus prn, Start Midodrine 10 mg Q 8hrs, Wean off levophed    GI: GI ppx, OG tube feeds    RENAL:  monitor lytes and replete prn, low urine output, with failure to response to lasix trial. Will start on low dose fluid     INFECTIOUS DISEASE: Follow up cultures.  Complete antibiotic course (last day abx 4/23)    HEMATOLOGICAL:  SLIM dvt ppx    ENDOCRINE:  Follow up FS.  Insulin protocol if needed.    Prognosis poor

## 2018-04-24 NOTE — CONSULT NOTE ADULT - ASSESSMENT
IMPRESSION:    AHRF   HCAP  Pulmonary edema   Bilateral pleural effusions.  R > L.  RO parapneumonic effusions     PLAN:    CNS: Needs sedation for MV    HEENT: Oral care    PULMONARY:  HOB @ 45 degrees. Vent settings:  , RR 16, FiO2 100, PEEP 8    CARDIOVASCULAR: I=O.  Ju.  ECHO     GI: GI prophylaxis. OG Feeding post intubation     RENAL:  Follow up lytes.  Correct as needed    INFECTIOUS DISEASE: Follow up cultures.  Urine Legionella and Strep    HEMATOLOGICAL:  DVT prophylaxis.    ENDOCRINE:  Follow up FS.  Insulin protocol if needed.    MUSCULOSKELETAL:    Prognosis guarded
68yFemale being evaluated for goals of care in setting of acute resp failure, PTX, suspected metastatic disease.    Met daughter- Palliative Care services introduced. Plan to cont current level of care, awaiting Onc input.   May consider family meeting if no improvement after the weekend.    Recommendations:  Ongoing ICU mngmnt  Overall poor prognosis  Full code status    We will follow
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  lost to f/u after that.  admitted with cough ,completed  abx for PNA.  Left iartogenic pneumothorax,intubated for acute resp failure,s/p chest tube.  Pulmonary edema   Bilateral pleural effusions.  R > L.  RO parapneumonic effusions VS malignant effusion   f/u repeat CT chest  Anemia likely multifactorial  transfuse to keep hb >7  check ferritin/iron profile/retic count  Colon cancer with peritoneal mets ,Bx proven ,5 months back,did not get any Rx as she refuse.lost to f/u , .now CT scan showing liver and pancreatic mass with carcinometosis,ascites,likley due to advance metastatic dz  further Mn for colon cancer when clinically more stable. will need pet/ct if she agrees and overall clinical condition is stable for further Rx.  overall prognosis is very poor.  explained to her partner at bedside Mr Marks at length.  cont other supportive care.  will f/u
69 y/o female with pneumothorax.

## 2018-04-24 NOTE — CONSULT NOTE ADULT - SUBJECTIVE AND OBJECTIVE BOX
Patient is a 68y old  Female who presents with a chief complaint of sob, temperature, dec. BP (16 Apr 2018 05:53)      HPI:  68 Y F [facility] temporary resident for rehab with pmh of anxiety/depression, h/o colon Ca s/p R hemicolectomy not on current treatment, COPD not on home O2 and htn was brought to the ED to rule out pneumonia. As per the patient she has been having productive cough for 2 days, associated with vomiting and diarrhea. She was sent in because a chest xray done at the NH showed r sided effusions. In the ED Pt was hemodynamically stable but desatting down to 89% on nasal canula. She was placed on bipap and is tolerating it well. Her cxr showed bilateral pneumonia and she was given cefepime, azithro and vanco. The patient denies abdominal pain or chest pain. She says her belly is generally distended and her LE edema has improved from before. She has no fevers, sob, chills or soar throat. (16 Apr 2018 01:47)      PAST MEDICAL & SURGICAL HISTORY:  Depression, unspecified depression type  Anxiety  Malignant neoplasm of colon, unspecified part of colon  HTN (hypertension)  COPD (chronic obstructive pulmonary disease)      SOCIAL HX:   Smoking          S smoker                ETOH         No                    Other    FAMILY HISTORY:  No pertinent family history in first degree relatives      ROS:  See HPI     Allergies    No Known Allergies    Intolerances          PHYSICAL EXAM    ICU Vital Signs Last 24 Hrs  T(C): 38.1 (16 Apr 2018 08:00), Max: 38.8 (15 Apr 2018 18:05)  T(F): 100.5 (16 Apr 2018 08:00), Max: 101.8 (15 Apr 2018 18:05)  HR: 110 (16 Apr 2018 08:00) (82 - 114)  BP: 103/59 (16 Apr 2018 08:00) (87/52 - 120/57)  BP(mean): 69 (16 Apr 2018 08:00) (69 - 80)  ABP: --  ABP(mean): --  RR: 33 (16 Apr 2018 08:00) (15 - 62)  SpO2: 91% (16 Apr 2018 08:00) (89% - 98%)      General: In NAD   HEENT:  SANTIAGO              Lymph Nodes: No cervical LN   Lungs: Bilateral BS  Cardiovascular: Regular  Abdomen: Soft, Positive BS  Extremities: No clubbing  Skin: Warm  Neurological: Non focal       04-15-18 @ 07:01  -  04-16-18 @ 07:00  --------------------------------------------------------  IN:    IV PiggyBack: 100 mL  Total IN: 100 mL    OUT:    Voided: 100 mL  Total OUT: 100 mL    Total NET: 0 mL          LABS:                          8.1    13.47 )-----------( 324      ( 16 Apr 2018 04:22 )             24.8                                               04-16    144  |  105  |  40<H>  ----------------------------<  117<H>  5.4<H>   |  25  |  1.4    Ca    8.2<L>      16 Apr 2018 04:22  Phos  4.4     04-16  Mg     2.7     04-16    TPro  5.6<L>  /  Alb  2.2<L>  /  TBili  0.2  /  DBili  <0.2  /  AST  44<H>  /  ALT  10  /  AlkPhos  177<H>  04-16      PT/INR - ( 16 Apr 2018 04:22 )   PT: 15.70 sec;   INR: 1.44 ratio         PTT - ( 16 Apr 2018 04:22 )  PTT:33.6 sec                                       Urinalysis Basic - ( 16 Apr 2018 04:19 )    Color: Yellow / Appearance: Clear / SG: >=1.030 / pH: x  Gluc: x / Ketone: Negative  / Bili: Negative / Urobili: 0.2   Blood: x / Protein: Trace / Nitrite: Negative   Leuk Esterase: Negative / RBC: x / WBC x   Sq Epi: x / Non Sq Epi: Occasional /HPF / Bacteria: Few /HPF        CARDIAC MARKERS ( 15 Apr 2018 18:10 )  x     / 0.01 ng/mL / 50 U/L / x     / 1.5 ng/mL                                            LIVER FUNCTIONS - ( 16 Apr 2018 04:22 )  Alb: 2.2 g/dL / Pro: 5.6 g/dL / ALK PHOS: 177 U/L / ALT: 10 U/L / AST: 44 U/L / GGT: x                                                                                                                                   ABG - ( 15 Apr 2018 19:24 )  pH: 7.34  /  pCO2: 47    /  pO2: 78    / HCO3: 25    / Base Excess: -0.5  /  SaO2: 95                   X-Rays    Bilateral effusions R>L,  bilateral infiltrates                                                                                 ECHO    MEDICATIONS  (STANDING):  cefepime  IVPB 1000 milliGRAM(s) IV Intermittent daily  diltiazem    milliGRAM(s) Oral daily  gabapentin 100 milliGRAM(s) Oral three times a day  levoFLOXacin IVPB 500 milliGRAM(s) IV Intermittent every 24 hours  mirtazapine 15 milliGRAM(s) Oral at bedtime  pantoprazole    Tablet 40 milliGRAM(s) Oral before breakfast  vancomycin  IVPB 750 milliGRAM(s) IV Intermittent daily    MEDICATIONS  (PRN):
Patient is a 67 y/o female with hx of colorectal ca with metastatic disease to the liver. She was admitted for pneumonia with septic shock and developed Iatrogenic Pneumothorax from line placement. She had a Chest tube placed which showed resolving pneuothorax which has since worsened.
Patient is a 68y old  Female who presents with a chief complaint of sob, temperature, dec. BP (16 Apr 2018 05:53)      HPI:  68 Y F [facility] temporary resident for rehab with pmh of anxiety/depression, h/o colon Ca s/p R hemicolectomy not on current treatment, COPD not on home O2 and htn was brought to the ED to rule out pneumonia. As per the patient she has been having productive cough for 2 days, associated with vomiting and diarrhea. She was sent in because a chest xray done at the NH showed r sided effusions. In the ED Pt was hemodynamically stable but desatting down to 89% on nasal canula. She was placed on bipap and is tolerating it well. Her cxr showed bilateral pneumonia and she was given cefepime, azithro and vanco. The patient denies abdominal pain or chest pain. She says her belly is generally distended and her LE edema has improved from before. She has no fevers, sob, chills or soar throat. (16 Apr 2018 01:47)       ROS:  unable to obtain    PAST MEDICAL & SURGICAL HISTORY:  Depression, unspecified depression type  Anxiety  Malignant neoplasm of colon, unspecified part of colon  HTN (hypertension)  COPD (chronic obstructive pulmonary disease)      SOCIAL HISTORY:    FAMILY HISTORY:  No pertinent family history in first degree relatives      MEDICATIONS  (STANDING):  chlorhexidine 0.12% Liquid 15 milliLiter(s) Swish and Spit two times a day  fentaNYL   Infusion 0.1 MICROgram(s)/kG/Hr (0.616 mL/Hr) IV Continuous <Continuous>  gabapentin 100 milliGRAM(s) Oral three times a day  heparin  Injectable 5000 Unit(s) SubCutaneous every 8 hours  lactulose Syrup 10 Gram(s) Oral three times a day  midodrine 10 milliGRAM(s) Oral three times a day  norepinephrine Infusion 0.02 MICROgram(s)/kG/Min (2.31 mL/Hr) IV Continuous <Continuous>  pantoprazole   Suspension 40 milliGRAM(s) Oral before breakfast  polyethylene glycol 3350 17 Gram(s) Oral daily  sodium chloride 0.9% Bolus 500 milliLiter(s) IV Bolus once  sodium chloride 0.9% Bolus 500 milliLiter(s) IV Bolus once    MEDICATIONS  (PRN):  acetaminophen  Suppository 650 milliGRAM(s) Rectal every 6 hours PRN For Temp greater than 38 C (100.4 F)      Allergies    No Known Allergies    Intolerances        Vital Signs Last 24 Hrs  T(C): 36.6 (24 Apr 2018 12:00), Max: 37.3 (24 Apr 2018 00:00)  T(F): 97.8 (24 Apr 2018 12:00), Max: 99.2 (24 Apr 2018 00:00)  HR: 112 (24 Apr 2018 18:30) (80 - 122)  BP: 92/57 (24 Apr 2018 18:30) (75/54 - 133/87)  BP(mean): 65 (24 Apr 2018 18:30) (58 - 104)  RR: 22 (24 Apr 2018 18:30) (12 - 45)  SpO2: 100% (24 Apr 2018 18:30) (88% - 100%)    PHYSICAL EXAM  General: under sedation  HEENT: orally intubated  Neck: supple  CV: normal S1/S2 with no murmur rubs or gallops  Lungs: positive air movement b/l ant lungs,+ left sided chest tube  Abdomen: soft non-tender non-distended, no hepatosplenomegaly  Ext: no clubbing cyanosis or edema  Skin: no rashes and no petechiae  Neuro: under sedation    LABS:                          8.7    24.64 )-----------( 237      ( 24 Apr 2018 05:01 )             26.7         Mean Cell Volume : 91.1 fL  Mean Cell Hemoglobin : 29.7 pg  Mean Cell Hemoglobin Concentration : 32.6 g/dL  Auto Neutrophil # : 21.72 K/uL  Auto Lymphocyte # : 0.61 K/uL  Auto Monocyte # : 1.53 K/uL  Auto Eosinophil # : 0.05 K/uL  Auto Basophil # : 0.06 K/uL  Auto Neutrophil % : 88.2 %  Auto Lymphocyte % : 2.5 %  Auto Monocyte % : 6.2 %  Auto Eosinophil % : 0.2 %  Auto Basophil % : 0.2 %      Serial CBC's  04-24 @ 05:01  Hct-26.7 / Hgb-8.7 / Plat-237 / RBC-2.93 / WBC-24.64  Serial CBC's  04-23 @ 04:50  Hct-21.9 / Hgb-6.8 / Plat-244 / RBC-2.37 / WBC-18.10  Serial CBC's  04-22 @ 04:56  Hct-21.3 / Hgb-7.1 / Plat-245 / RBC-2.37 / WBC-9.83  Serial CBC's  04-21 @ 04:57  Hct-21.6 / Hgb-7.1 / Plat-254 / RBC-2.42 / WBC-9.01      04-24    133<L>  |  99  |  42<H>  ----------------------------<  121<H>  5.4<H>   |  27  |  1.1    Ca    8.1<L>      24 Apr 2018 05:01                        BLOOD SMEAR INTERPRETATION:       RADIOLOGY & ADDITIONAL STUDIES:
REQUESTED OF: DR Drummond	    CLINICAL ISSUE TO BE EVALUATED BY CONSULTANT: Goals of Care        CHRISTAL DANIELSON 68yFemale  HPI:  68 Y F [facility] temporary resident for rehab with pmh of anxiety/depression, h/o colon Ca s/p R hemicolectomy not on current treatment, COPD not on home O2 and htn was brought to the ED to rule out pneumonia. As per the patient she has been having productive cough for 2 days, associated with vomiting and diarrhea. She was sent in because a chest xray done at the NH showed r sided effusions. In the ED Pt was hemodynamically stable but desatting down to 89% on nasal canula. She was placed on bipap and is tolerating it well. Her cxr showed bilateral pneumonia and she was given cefepime, azithro and vanco. The patient denies abdominal pain or chest pain. She says her belly is generally distended and her LE edema has improved from before. She has no fevers, sob, chills or soar throat. (16 Apr 2018 01:47)    Pt seen in ICU, s/p chest tube placement, orally intubated, sedated.  Family present.    PAST MEDICAL & SURGICAL HISTORY:  Depression, unspecified depression type  Anxiety  Malignant neoplasm of colon, unspecified part of colon  HTN (hypertension)  COPD (chronic obstructive pulmonary disease)        PHYSICAL EXAM:  Elderly female, critically ill  PERRL  RRR  ABdom soft  No edema            T(C): 36.5, Max: 37.4 (04:00)  HR: 102 (78 - 136)  BP: 99/58 (68/45 - 116/72)  RR: 24 (15 - 36)  SpO2: 96% (94% - 100%)      LABS/STUDIES:  04-20    138  |  100  |  45<H>  ----------------------------<  63<L>  5.1<H>   |  27  |  1.7<H>    Ca    8.6      20 Apr 2018 05:59                              7.6    11.83 )-----------( 289      ( 20 Apr 2018 05:59 )             22.9       MEDICATIONS  (STANDING):  chlorhexidine 0.12% Liquid 15 milliLiter(s) Swish and Spit two times a day  fentaNYL   Infusion 0.5 MICROgram(s)/kG/Hr (3.08 mL/Hr) IV Continuous <Continuous>  gabapentin 100 milliGRAM(s) Oral three times a day  lactated ringers. 500 milliLiter(s) (500 mL/Hr) IV Continuous <Continuous>  norepinephrine Infusion 0.02 MICROgram(s)/kG/Min (2.31 mL/Hr) IV Continuous <Continuous>  pantoprazole   Suspension 40 milliGRAM(s) Oral before breakfast    MEDICATIONS  (PRN):  acetaminophen  Suppository 650 milliGRAM(s) Rectal every 6 hours PRN For Temp greater than 38 C (100.4 F)        [place] Symptom Assesment Scale      PPS (Palliative Performance Scale): 50

## 2018-04-24 NOTE — CHART NOTE - NSCHARTNOTEFT_GEN_A_CORE
Registered Dietitian Follow-Up     Patient Profile Reviewed                           Yes [x]   No []     Nutrition History Previously Obtained        Yes []  No [x]  pt continues to be intubated; no family at bedside     Pertinent Subjective Information: ????     Pertinent Medical Interventions: Noted bronchoscopy 4/23 with possible L sided chest tube removal - no new note indicating if procedure happened. Noted patient hemoglobin steadily downtrending; Dr. Schultz, hematology oncology consult pending due to CT Abdomen/Pelvis findings of hepatic and pancreatic carcinomatosis. Noted poor prognosis.     Diet order: NPO with Tube Feed: Osmolite 1.5, 45 mL/hr (1620 kcal, 68 g pro, 821 mL free H2O)      Anthropometrics:  - Ht.: 154.94 cm  - Wt.: (4/22) 71.2 kg / 156.9 lbs  - wt change: lowest wt (4/16) 61.6 kg vs highest wt (4/22) 71.2 kg  - BMI: 29.7 (using wt 71.2 kg) vs BMI 26.0 (using wt (4/17) 62.4 kg)  - IBW: 48 kg / 105 lbs     Pertinent Lab Data: (4/24) RBC 2.93, H/H 8.7/26.7, Na 133, K 5.7, BUN 42, Gluc 121, (4/22) Mg 1.7, PO4 1.6     Pertinent Meds: heparin, sodium chloride 0.9%, midodrine, norepinephrine, pantoprazole      Physical Findings:  - Appearance: intubated, edema 3+/4+ dependent  - GI function: Abd firm, hypoactive, LBM 4/24  - Tubes: orogastric  - Oral/Mouth cavity: intubated  - Skin: ecchymosis     Nutrition Requirements  Weight Used:     Estimated Energy Needs    Continue []  Adjust []  Adjusted Energy Recommendations:   kcal/day        Estimated Protein Needs    Continue []  Adjust []  Adjusted Protein Recommendations:   gm/day        Estimated Fluid Needs        Continue [x]  Adjust []  Adjusted Fluid Recommendations:   Per ICU team     Nutrient Intake:        [x] Previous Nutrition Diagnosis: Inadequate protein-energy intake - ????            [] Ongoing          [] Resolved    [] No active nutrition diagnosis identified at this time     Nutrition Diagnostic #1  Problem:  Etiology:  Statement:     Nutrition Intervention: Enteral nutrition    Recommendations:     Goal/Expected Outcome: Pt to meet >85% but not exceed 105% of estimated nutrient needs     Indicator/Monitoring: energy intake, diet order, glucose profile, electrolyte profile, body composition, nutrition focused physical findings Registered Dietitian Follow-Up     Patient Profile Reviewed                           Yes [x]   No []     Nutrition History Previously Obtained        Yes []  No [x]  pt continues to be intubated; no family at bedside     Pertinent Subjective Information: Per RN, pt not tolerating tube feeds. Tube feeds being held at time of follow-up d/t Report of pt vomiting; when tube was removed there was blockage caused by the formula, however RN said there was no residual in the AM. Needs to put a new tube back in order to restart feeds.     Pertinent Medical Interventions: Noted bronchoscopy 4/23 with possible L sided chest tube removal - no new note indicating if procedure happened. Noted patient hemoglobin steadily downtrending; Dr. Schultz, hematology oncology consult pending due to CT Abdomen/Pelvis findings of hepatic and pancreatic carcinomatosis. Noted poor prognosis.     Diet order: NPO with Tube Feed: Osmolite 1.5, 45 mL/hr (1620 kcal, 68 g pro, 821 mL free H2O)      Anthropometrics:  - Ht.: 154.94 cm  - Wt.: (4/22) 71.2 kg / 156.9 lbs  - wt change: lowest wt (4/16) 61.6 kg vs highest wt (4/22) 71.2 kg  - BMI: 29.7 (using wt 71.2 kg) vs BMI 26.0 (using wt (4/17) 62.4 kg)  - IBW: 48 kg / 105 lbs     Pertinent Lab Data: (4/24) RBC 2.93, H/H 8.7/26.7, Na 133, K 5.7, BUN 42, Gluc 121, (4/22) Mg 1.7, PO4 1.6     Pertinent Meds: heparin, sodium chloride 0.9%, midodrine, norepinephrine, pantoprazole, fentanyl (running at bedside in D5 12.3 mL, 50 kcals)     Physical Findings:  - Appearance: intubated, edema 3+/4+ dependent  - GI function: Abd firm, hypoactive, LBM 4/24  - Tubes: orogastric  - Oral/Mouth cavity: intubated  - Skin: ecchymosis     Nutrition Requirements  Weight Used: wt from 4/17 62.4 kg     Estimated Energy Needs    Continue []  Adjust [x]  Adjusted Energy Recommendations:  1396 kcal/day (PS 2003b, using Ve 11, Tmax 37.3, wt 62.4 kg)        Estimated Protein Needs    Continue [x]  Adjust []  Adjusted Protein Recommendations: 75-93 gm/day (1.2-1.5 gm/kg ABW)        Estimated Fluid Needs        Continue [x]  Adjust []  Adjusted Fluid Recommendations:   Per ICU team     Nutrient Intake: pt not meeting nutrient needs; tube feed stopped        [x] Previous Nutrition Diagnosis: Inadequate protein-energy intake - ongoing; tube feeds stopped???     Nutrition Diagnostic #1  Problem: Less than optimal enteral nutrition composition or modality  Etiology: tube feed regimen  Statement: ????     Nutrition Intervention: Enteral nutrition    Recommendations: ????     Goal/Expected Outcome: Pt to meet >85% but not exceed 105% of estimated nutrient needs     Indicator/Monitoring: energy intake, diet order, glucose profile, electrolyte profile, body composition, nutrition focused physical findings Registered Dietitian Follow-Up     Patient Profile Reviewed                           Yes [x]   No []     Nutrition History Previously Obtained        Yes []  No [x]  pt continues to be intubated; no family at bedside     Pertinent Subjective Information: Per RN, pt not tolerating tube feeds. Tube feeds being held at time of follow-up d/t Report of pt vomiting; when tube was removed there was blockage caused by the formula, however RN said there was no residual in the AM. Needs to put a new tube back in order to restart feeds.     Pertinent Medical Interventions: Noted bronchoscopy 4/23 with possible L sided chest tube removal - no new note indicating if procedure happened. Noted patient hemoglobin steadily downtrending; Heme/onc consult pending due to CT hepatic and pancreatic carcinomatosis.    Diet order: NPO with Tube Feed: Osmolite 1.5, 45 mL/hr (1620 kcal, 68 g pro, 821 mL free H2O). From 4/21-4/23, pt received an average of 948 mL/day (87% of goal volume) - 1422 kcal, 59 g protein.     Anthropometrics:  - Ht.: 154.94 cm  - Wt.: (4/22) 71.2 kg / 156.9 lbs  - wt change: lowest wt (4/16) 61.6 kg vs highest wt (4/22) 71.2 kg  - BMI: 29.7 (using wt 71.2 kg) vs BMI 26.0 (using wt (4/17) 62.4 kg)  - IBW: 48 kg / 105 lbs     Pertinent Lab Data: (4/24) RBC 2.93, H/H 8.7/26.7, Na 133, K 5.7, BUN 42, Gluc 121, (4/22) Mg 1.7, PO4 1.6     Pertinent Meds: heparin, sodium chloride 0.9%, midodrine, norepinephrine, pantoprazole, fentanyl (running at bedside in D5 12.3 mL, 50 kcals)     Physical Findings:  - Appearance: intubated, edema 3+/4+ dependent  - GI function: Abd firm, hypoactive, LBM 4/24. Vomiting today; feeding tube found to be clogged with curdled formula.  - Tubes: orogastric; per RN, awaiting replacement  - Oral/Mouth cavity: intubated  - Skin: ecchymosis     Nutrition Requirements  Weight Used: wt from 4/17 62.4 kg     Estimated Energy Needs    Continue []  Adjust [x]  Adjusted Energy Recommendations:  1396 kcal/day (PS 2003b, using Ve 11, Tmax 37.3, wt 62.4 kg)        Estimated Protein Needs    Continue [x]  Adjust []  Adjusted Protein Recommendations: 75-93 gm/day (1.2-1.5 gm/kg ABW)        Estimated Fluid Needs        Continue [x]  Adjust []  Adjusted Fluid Recommendations:   Per ICU team     Nutrient Intake: pt not meeting nutrient needs; tube feed stopped. Last infusion received today at 11:00 am per flowsheets.  Per 4/21-4/23 intake records, pt received an average of 102% of kcal & 79% protein/day over this period.        [x] Previous Nutrition Diagnosis: Inadequate protein-energy intake - ongoing; tube feeds stopped???     Nutrition Intervention: Enteral nutrition    Recommendations: If feeding tube replaced & plan to reinitiate EN regimen, monitor Mg/PO4/K & replete as needed. Decrease Osmolite 1.5 goal rate to 35 mL/hr. Order 2 pkts Beneprotein q12hrs. Regimen at goal to provide 1360 kcal, 77 g protein, 1604 mg K+, 840 mg PO4, 638 mL free H2O. Flushes per LIP.     Goal/Expected Outcome: Pt to meet >85% but not exceed 105% of estimated nutrient needs within 3 days.     Indicator/Monitoring: energy intake, diet order, glucose profile, electrolyte profile, body composition, nutrition focused physical findings

## 2018-04-24 NOTE — PROGRESS NOTE ADULT - ASSESSMENT
This case was discussed at about 0900 hrs. and again later on in the day at about noon with the team.  Plan is to obtain a CAT scan of the chest to evaluate the size of the right pleural effusion and more importantly than that to make sure that we do not dealing with a loculated left pneumothorax.  Over plan is to gently volume contracted keep patient hemodynamically stable with pressor agents Right chest and make further decisions about the left chest depending on the CAT scan.

## 2018-04-24 NOTE — PROGRESS NOTE ADULT - SUBJECTIVE AND OBJECTIVE BOX
Over Night Events:  Patient seen and examined.       ROS:  See HPI    PHYSICAL EXAM    ICU Vital Signs Last 24 Hrs  T(C): 36.6 (24 Apr 2018 12:00), Max: 37.3 (24 Apr 2018 00:00)  T(F): 97.8 (24 Apr 2018 12:00), Max: 99.2 (24 Apr 2018 00:00)  HR: 112 (24 Apr 2018 18:30) (80 - 122)  BP: 92/57 (24 Apr 2018 18:30) (75/54 - 133/87)  BP(mean): 65 (24 Apr 2018 18:30) (58 - 104)  ABP: --  ABP(mean): --  RR: 22 (24 Apr 2018 18:30) (12 - 45)  SpO2: 100% (24 Apr 2018 18:30) (88% - 100%)          This patient was examined at approximately 0620 hrs. and again at 0900 hrs.  At the time of examination she has a #8 ET tube in place no effect in place pulse was 83 blood pressure 84/58 spoke skin was warm and dry.  She had a left chest tube in place.  EIP on the ventilator was 20 HNR IJ in place.  She had no CC or E.  She had poor dentition she has soft abdomen.  There was a Gonsalez in place.  Chest x-ray revealed a right pleural effusion.  Because of some difficulty oxygenated we are going I suggested CAT scan of the chest noncontrast        gical:     I&O's Detail    23 Apr 2018 07:01  -  24 Apr 2018 07:00  --------------------------------------------------------  IN:    fentaNYL  Infusion: 219.2 mL    Osmolite: 810 mL    Packed Red Blood Cells: 321 mL    Sodium Chloride 0.9% IV Bolus: 1000 mL  Total IN: 2350.2 mL    OUT:    Chest Tube: 20 mL    Indwelling Catheter - Urethral: 265 mL  Total OUT: 285 mL    Total NET: 2065.2 mL      24 Apr 2018 07:01  -  24 Apr 2018 18:50  --------------------------------------------------------  IN:    fentaNYL  Infusion: 67.7 mL    Osmolite: 180 mL  Total IN: 247.7 mL    OUT:    Indwelling Catheter - Urethral: 105 mL  Total OUT: 105 mL    Total NET: 142.7 mL          LABS:                          8.7    24.64 )-----------( 237      ( 24 Apr 2018 05:01 )             26.7         24 Apr 2018 05:01    133    |  99     |  42     ----------------------------<  121    5.4     |  27     |  1.1      Ca    8.1        24 Apr 2018 05:01                                                                                                                                                                                               Mode: AC/ CMV (Assist Control/ Continuous Mandatory Ventilation)  RR (machine): 16  TV (machine): 350  FiO2: 50  PEEP: 5  ITime: 1  MAP: 12  PIP: 30                                      ABG - ( 24 Apr 2018 18:08 )  pH, Arterial: 7.38  pH, Blood: x     /  pCO2: 44    /  pO2: 65    / HCO3: 26    / Base Excess: 0.7   /  SaO2: 94                  MEDICATIONS  (STANDING):  chlorhexidine 0.12% Liquid 15 milliLiter(s) Swish and Spit two times a day  fentaNYL   Infusion 0.1 MICROgram(s)/kG/Hr (0.616 mL/Hr) IV Continuous <Continuous>  gabapentin 100 milliGRAM(s) Oral three times a day  heparin  Injectable 5000 Unit(s) SubCutaneous every 8 hours  lactulose Syrup 10 Gram(s) Oral three times a day  midodrine 10 milliGRAM(s) Oral three times a day  norepinephrine Infusion 0.02 MICROgram(s)/kG/Min (2.31 mL/Hr) IV Continuous <Continuous>  pantoprazole   Suspension 40 milliGRAM(s) Oral before breakfast  polyethylene glycol 3350 17 Gram(s) Oral daily  sodium chloride 0.9% Bolus 500 milliLiter(s) IV Bolus once  sodium chloride 0.9% Bolus 500 milliLiter(s) IV Bolus once    MEDICATIONS  (PRN):  acetaminophen  Suppository 650 milliGRAM(s) Rectal every 6 hours PRN For Temp greater than 38 C (100.4 F)          Xrays:  TLC:  OG:  ET tube:                                                                                       ECHO:    IMPRESSION:      PLAN:    CNS:    HEENT:    PULMONARY:    CARDIOVASCULAR:    GI: GI prophylaxis.  Feeding     RENAL:    INFECTIOUS DISEASE:    HEMATOLOGICAL:  DVT prophylaxis.    ENDOCRINE:  Follow up FS.  Insulin protocol if needed.    MUSCULOSKELETAL:

## 2018-04-25 NOTE — PROGRESS NOTE ADULT - SUBJECTIVE AND OBJECTIVE BOX
Patient is a 68y old Female who presents with a chief complaint of sob, temperature, dec. BP (16 Apr 2018 05:53)    Currently admitted to medicine with the primary diagnosis of Pneumonia    Today is hospital day 9d.    BRIEF HOSPITAL COURSE:         EVENTS LAST 24HRS:     PAST MEDICAL & SURGICAL HISTORY  Depression, unspecified depression type  Anxiety  Malignant neoplasm of colon, unspecified part of colon  HTN (hypertension)  COPD (chronic obstructive pulmonary disease)      SOCIAL HISTORY:      REVIEW OF SYSTEMS:  See HPI    ALLERGIES:    MEDICATIONS:  STANDING MEDICATIONS  bisacodyl Suppository 10 milliGRAM(s) Rectal once  chlorhexidine 0.12% Liquid 15 milliLiter(s) Swish and Spit two times a day  fentaNYL   Infusion 0.1 MICROgram(s)/kG/Hr IV Continuous <Continuous>  gabapentin 100 milliGRAM(s) Oral three times a day  heparin  Injectable 5000 Unit(s) SubCutaneous every 8 hours  midodrine 10 milliGRAM(s) Oral three times a day  norepinephrine Infusion 0.02 MICROgram(s)/kG/Min IV Continuous <Continuous>  pantoprazole   Suspension 40 milliGRAM(s) Oral before breakfast  polyethylene glycol 3350 17 Gram(s) Oral daily  sodium chloride 0.9% Bolus 500 milliLiter(s) IV Bolus once  sodium chloride 0.9% Bolus 500 milliLiter(s) IV Bolus once  sodium chloride 0.9%. 1000 milliLiter(s) IV Continuous <Continuous>    PRN MEDICATIONS  acetaminophen  Suppository 650 milliGRAM(s) Rectal every 6 hours PRN  lactulose Syrup 15 Gram(s) Oral every 4 hours PRN    VITALS:     Mode: AC/ CMV (Assist Control/ Continuous Mandatory Ventilation)  RR (machine): 16  TV (machine): 350  FiO2: 60  PEEP: 5  ITime: 1  MAP: 12  PIP: 22      ICU Vital Signs Last 24 Hrs:    T(C): 37 (25 Apr 2018 08:00), Max: 37.8 (25 Apr 2018 00:00)  T(F): 98.6 (25 Apr 2018 08:00), Max: 100 (25 Apr 2018 00:00)  HR: 82 (25 Apr 2018 09:00) (82 - 124)  BP: 87/56 (25 Apr 2018 09:00) (79/50 - 136/78)  BP(mean): 63 (25 Apr 2018 09:00) (57 - 111)  ABP: --  ABP(mean): --  RR: 18 (25 Apr 2018 09:00) (13 - 38)  SpO2: 100% (25 Apr 2018 09:00) (94% - 100%)      ABG - ( 25 Apr 2018 07:25 )  pH, Arterial: 7.31  pH, Blood: x     /  pCO2: 53    /  pO2: 139   / HCO3: 26    / Base Excess: -0.1  /  SaO2: 100                 I&O's Detail:      24 Apr 2018 07:01  -  25 Apr 2018 07:00  --------------------------------------------------------  IN:    fentaNYL  Infusion: 178.1 mL    Osmolite: 495 mL    sodium chloride 0.9%.: 675 mL  Total IN: 1348.1 mL    OUT:    Indwelling Catheter - Urethral: 170 mL  Total OUT: 170 mL    Total NET: 1178.1 mL      25 Apr 2018 07:01  -  25 Apr 2018 10:52  --------------------------------------------------------  IN:    fentaNYL  Infusion: 27.6 mL    Osmolite: 135 mL    sodium chloride 0.9%.: 225 mL  Total IN: 387.6 mL    OUT:    Indwelling Catheter - Urethral: 15 mL  Total OUT: 15 mL    Total NET: 372.6 mL          LABS:                        8.1    43.66 )-----------( 234      ( 25 Apr 2018 04:20 )             24.6     04-25    137  |  102  |  49<H>  ----------------------------<  109<H>  5.5<H>   |  27  |  1.7<H>    Ca    7.9<L>      25 Apr 2018 04:20            CAPILLARY BLOOD GLUCOSE            CULTURES:      PHYSICAL EXAM:    General: No acute distress.  Alert, oriented, interactive, nonfocal.    HEENT: Pupils equal, reactive to light symmetrically.    PULM: Clear to auscultation bilaterally, no significant sputum production.    CVS: Regular rate and rhythm, no murmurs, rubs, or gallops.    ABD: Soft, nondistended, nontender, no masses.    EXT: No edema, nontender.    SKIN: Warm and well perfused, no rashes noted.    RADIOLOGY: Patient is a 68y old Female who presents with a chief complaint of sob, temperature, dec. BP (16 Apr 2018 05:53)    Currently admitted to medicine with the primary diagnosis of Pneumonia    Today is hospital day 9d.    BRIEF HOSPITAL COURSE:     68 Y F [facility] temporary resident for rehab with PMH of anxiety/depression, h/o colon Ca s/p R hemicolectomy not on current treatment, COPD not on home O2 and HTN was brought to the ED to r/o PNA. Developed acute hypoxic respiratory failure requiring intubation. Patient found to have pleural effusion R>L s/p thoracocentesis with removal of 1L of fluid. Was extubated but subsequently re-intubated within 24 hours. After re-intubation, she was hypotensive requiring pressors. During central line placement in left IJ, patient developed iatrogenic pneumothorax of left lung with subsequent chest tube insertion.       EVENTS LAST 24HRS:     CT chest shows reaccumulation of fluid in the right chest (official results below)      PAST MEDICAL & SURGICAL HISTORY  Depression, unspecified depression type  Anxiety  Malignant neoplasm of colon, unspecified part of colon  HTN (hypertension)  COPD (chronic obstructive pulmonary disease)      SOCIAL HISTORY:      REVIEW OF SYSTEMS:  See HPI    ALLERGIES:    MEDICATIONS:  STANDING MEDICATIONS  bisacodyl Suppository 10 milliGRAM(s) Rectal once  chlorhexidine 0.12% Liquid 15 milliLiter(s) Swish and Spit two times a day  fentaNYL   Infusion 0.1 MICROgram(s)/kG/Hr IV Continuous <Continuous>  gabapentin 100 milliGRAM(s) Oral three times a day  heparin  Injectable 5000 Unit(s) SubCutaneous every 8 hours  midodrine 10 milliGRAM(s) Oral three times a day  norepinephrine Infusion 0.02 MICROgram(s)/kG/Min IV Continuous <Continuous>  pantoprazole   Suspension 40 milliGRAM(s) Oral before breakfast  polyethylene glycol 3350 17 Gram(s) Oral daily  sodium chloride 0.9% Bolus 500 milliLiter(s) IV Bolus once  sodium chloride 0.9% Bolus 500 milliLiter(s) IV Bolus once  sodium chloride 0.9%. 1000 milliLiter(s) IV Continuous <Continuous>    PRN MEDICATIONS  acetaminophen  Suppository 650 milliGRAM(s) Rectal every 6 hours PRN  lactulose Syrup 15 Gram(s) Oral every 4 hours PRN    VITALS:     Mode: AC/ CMV (Assist Control/ Continuous Mandatory Ventilation)  RR (machine): 16  TV (machine): 350  FiO2: 60  PEEP: 5  ITime: 1  MAP: 12  PIP: 22      ICU Vital Signs Last 24 Hrs:    T(C): 37 (25 Apr 2018 08:00), Max: 37.8 (25 Apr 2018 00:00)  T(F): 98.6 (25 Apr 2018 08:00), Max: 100 (25 Apr 2018 00:00)  HR: 82 (25 Apr 2018 09:00) (82 - 124)  BP: 87/56 (25 Apr 2018 09:00) (79/50 - 136/78)  BP(mean): 63 (25 Apr 2018 09:00) (57 - 111)  ABP: --  ABP(mean): --  RR: 18 (25 Apr 2018 09:00) (13 - 38)  SpO2: 100% (25 Apr 2018 09:00) (94% - 100%)      ABG - ( 25 Apr 2018 07:25 )  pH, Arterial: 7.31  pH, Blood: x     /  pCO2: 53    /  pO2: 139   / HCO3: 26    / Base Excess: -0.1  /  SaO2: 100                 I&O's Detail:      24 Apr 2018 07:01  -  25 Apr 2018 07:00  --------------------------------------------------------  IN:    fentaNYL  Infusion: 178.1 mL    Osmolite: 495 mL    sodium chloride 0.9%.: 675 mL  Total IN: 1348.1 mL    OUT:    Indwelling Catheter - Urethral: 170 mL  Total OUT: 170 mL    Total NET: 1178.1 mL      25 Apr 2018 07:01  -  25 Apr 2018 10:52  --------------------------------------------------------  IN:    fentaNYL  Infusion: 27.6 mL    Osmolite: 135 mL    sodium chloride 0.9%.: 225 mL  Total IN: 387.6 mL    OUT:    Indwelling Catheter - Urethral: 15 mL  Total OUT: 15 mL    Total NET: 372.6 mL          LABS:                        8.1    43.66 )-----------( 234      ( 25 Apr 2018 04:20 )             24.6     04-25    137  |  102  |  49<H>  ----------------------------<  109<H>  5.5<H>   |  27  |  1.7<H>    Ca    7.9<L>      25 Apr 2018 04:20            CAPILLARY BLOOD GLUCOSE            CULTURES:      PHYSICAL EXAM:    General: No acute distress.  Alert, oriented, interactive, nonfocal.    HEENT: Pupils equal, reactive to light symmetrically.    PULM: Clear to auscultation bilaterally, no significant sputum production.    CVS: Regular rate and rhythm, no murmurs, rubs, or gallops.    ABD: Soft, nondistended, nontender, no masses.    EXT: No edema, nontender.    SKIN: Warm and well perfused, no rashes noted.    RADIOLOGY: Patient is a 68y old Female who presents with a chief complaint of sob, temperature, dec. BP (16 Apr 2018 05:53)    Currently admitted to medicine with the primary diagnosis of Pneumonia    Today is hospital day 9d.    BRIEF HOSPITAL COURSE:     68 Y F [facility] temporary resident for rehab with PMH of anxiety/depression, h/o colon Ca s/p R hemicolectomy not on current treatment, COPD not on home O2 and HTN was brought to the ED to r/o PNA. Developed acute hypoxic respiratory failure requiring intubation. Patient found to have pleural effusion R>L s/p thoracocentesis with removal of 1L of fluid. Was extubated but subsequently re-intubated within 24 hours. After re-intubation, she was hypotensive requiring pressors. During central line placement in left IJ, patient developed iatrogenic pneumothorax of left lung with subsequent chest tube insertion.       EVENTS LAST 24HRS:     CT chest shows reaccumulation of fluid in the right chest (official results below), likely will need dignostic/therapeutic tap. Hematology oncology following; Daughter spoken to at bedside today by pulm fellow in regards to options of comfort vs aggressive measures. Daughter to decide with patient once she is more coherent off sedation     PAST MEDICAL & SURGICAL HISTORY  Depression, unspecified depression type  Anxiety  Malignant neoplasm of colon, unspecified part of colon  HTN (hypertension)  COPD (chronic obstructive pulmonary disease)      SOCIAL HISTORY:      REVIEW OF SYSTEMS:  See HPI    ALLERGIES:    MEDICATIONS:  STANDING MEDICATIONS  bisacodyl Suppository 10 milliGRAM(s) Rectal once  chlorhexidine 0.12% Liquid 15 milliLiter(s) Swish and Spit two times a day  fentaNYL   Infusion 0.1 MICROgram(s)/kG/Hr IV Continuous <Continuous>  gabapentin 100 milliGRAM(s) Oral three times a day  heparin  Injectable 5000 Unit(s) SubCutaneous every 8 hours  midodrine 10 milliGRAM(s) Oral three times a day  norepinephrine Infusion 0.02 MICROgram(s)/kG/Min IV Continuous <Continuous>  pantoprazole   Suspension 40 milliGRAM(s) Oral before breakfast  polyethylene glycol 3350 17 Gram(s) Oral daily  sodium chloride 0.9% Bolus 500 milliLiter(s) IV Bolus once  sodium chloride 0.9% Bolus 500 milliLiter(s) IV Bolus once  sodium chloride 0.9%. 1000 milliLiter(s) IV Continuous <Continuous>    PRN MEDICATIONS  acetaminophen  Suppository 650 milliGRAM(s) Rectal every 6 hours PRN  lactulose Syrup 15 Gram(s) Oral every 4 hours PRN    VITALS:     Mode: AC/ CMV (Assist Control/ Continuous Mandatory Ventilation)  RR (machine): 16  TV (machine): 350  FiO2: 60  PEEP: 5  ITime: 1  MAP: 12  PIP: 22      ICU Vital Signs Last 24 Hrs:    T(C): 37 (25 Apr 2018 08:00), Max: 37.8 (25 Apr 2018 00:00)  T(F): 98.6 (25 Apr 2018 08:00), Max: 100 (25 Apr 2018 00:00)  HR: 82 (25 Apr 2018 09:00) (82 - 124)  BP: 87/56 (25 Apr 2018 09:00) (79/50 - 136/78)  BP(mean): 63 (25 Apr 2018 09:00) (57 - 111)  ABP: --  ABP(mean): --  RR: 18 (25 Apr 2018 09:00) (13 - 38)  SpO2: 100% (25 Apr 2018 09:00) (94% - 100%)      ABG - ( 25 Apr 2018 07:25 )  pH, Arterial: 7.31  pH, Blood: x     /  pCO2: 53    /  pO2: 139   / HCO3: 26    / Base Excess: -0.1  /  SaO2: 100                 I&O's Detail:      24 Apr 2018 07:01  -  25 Apr 2018 07:00  --------------------------------------------------------  IN:    fentaNYL  Infusion: 178.1 mL    Osmolite: 495 mL    sodium chloride 0.9%.: 675 mL  Total IN: 1348.1 mL    OUT:    Indwelling Catheter - Urethral: 170 mL  Total OUT: 170 mL    Total NET: 1178.1 mL      25 Apr 2018 07:01  -  25 Apr 2018 10:52  --------------------------------------------------------  IN:    fentaNYL  Infusion: 27.6 mL    Osmolite: 135 mL    sodium chloride 0.9%.: 225 mL  Total IN: 387.6 mL    OUT:    Indwelling Catheter - Urethral: 15 mL  Total OUT: 15 mL    Total NET: 372.6 mL          LABS:                        8.1    43.66 )-----------( 234      ( 25 Apr 2018 04:20 )             24.6     04-25    137  |  102  |  49<H>  ----------------------------<  109<H>  5.5<H>   |  27  |  1.7<H>    Ca    7.9<L>      25 Apr 2018 04:20            CAPILLARY BLOOD GLUCOSE            CULTURES:      PHYSICAL EXAM:    General: No acute distress.  Alert, oriented, interactive, nonfocal.    HEENT: Pupils equal, reactive to light symmetrically.    PULM: Clear to auscultation bilaterally, no significant sputum production.    CVS: Regular rate and rhythm, no murmurs, rubs, or gallops.    ABD: Soft, nondistended, nontender, no masses.    EXT: Diffuse anasarca with palpable subcutaneous emphysema     SKIN: Warm and well perfused, no rashes noted.      RADIOLOGY:     CNS: fentanyl    HEENT: Oral care    PULMONARY:  HOB @ 45 degrees. Keep chest tube to suction ; f/u CT Chest Non Contrast    CARDIOVASCULAR: I>O , c/w LR bolus prn, On Midodrine 10 mg Q 8hrs, Wean off levophed    GI: GI ppx, OG tube feeds    RENAL:  monitor lytes and replete prn, low urine output, with failure to response to lasix trial. Likely in the setting of constipation     INFECTIOUS DISEASE: Completed antibiotic course (last day abx 4/23); Now with elevated white count     HEMATOLOGICAL:  SLIM dvt ppx    ENDOCRINE:  Follow up FS.  Insulin protocol if needed.    Prognosis poor

## 2018-04-25 NOTE — PROGRESS NOTE ADULT - SUBJECTIVE AND OBJECTIVE BOX
Over Night Events:  Patient seen and examined.       ROS:  See HPI    PHYSICAL EXAM    ICU Vital Signs Last 24 Hrs  T(C): 36.9 (25 Apr 2018 12:00), Max: 37.8 (25 Apr 2018 00:00)  T(F): 98.4 (25 Apr 2018 12:00), Max: 100 (25 Apr 2018 00:00)  HR: 100 (25 Apr 2018 14:00) (80 - 124)  BP: 110/70 (25 Apr 2018 14:00) (79/50 - 136/78)  BP(mean): 83 (25 Apr 2018 14:00) (57 - 111)  ABP: --  ABP(mean): --  RR: 22 (25 Apr 2018 14:00) (13 - 38)  SpO2: 97% (25 Apr 2018 14:00) (96% - 100%)    l:     This patient was examined at 0650 hrs.  At that time she had an ETT in place and oral Flexiflo in place and a left chest tube in place.  Her pulse was 87 blood pressure 101/64.  Her chest moves symmetrically with respiration.  She was on 60% oxygen and 5 of PEEP.  Her abdomen was soft.  There was no CC or E.  There was an RIJ in place and a well-healed infraumbilical scar.        I&O's Detail    24 Apr 2018 07:01  -  25 Apr 2018 07:00  --------------------------------------------------------  IN:    fentaNYL  Infusion: 178.1 mL    Osmolite: 495 mL    sodium chloride 0.9%.: 675 mL  Total IN: 1348.1 mL    OUT:    Indwelling Catheter - Urethral: 170 mL  Total OUT: 170 mL    Total NET: 1178.1 mL      25 Apr 2018 07:01  -  25 Apr 2018 14:53  --------------------------------------------------------  IN:    Enteral Tube Flush: 60 mL    fentaNYL  Infusion: 64.4 mL    Osmolite: 315 mL    sodium chloride 0.9%.: 525 mL  Total IN: 964.4 mL    OUT:    Indwelling Catheter - Urethral: 58 mL  Total OUT: 58 mL    Total NET: 906.4 mL          LABS:                          8.1    43.66 )-----------( 234      ( 25 Apr 2018 04:20 )             24.6         25 Apr 2018 04:20    137    |  102    |  49     ----------------------------<  109    5.5     |  27     |  1.7      Ca    7.9        25 Apr 2018 04:20                                                                                                                                                                                               Mode: AC/ CMV (Assist Control/ Continuous Mandatory Ventilation)  RR (machine): 16  TV (machine): 350  FiO2: 60  PEEP: 5  ITime: 1  MAP: 11  PIP: 33                                      ABG - ( 25 Apr 2018 14:31 )  pH, Arterial: 7.35  pH, Blood: x     /  pCO2: 46    /  pO2: 62    / HCO3: 26    / Base Excess: -0.3  /  SaO2: 93                  MEDICATIONS  (STANDING):  chlorhexidine 0.12% Liquid 15 milliLiter(s) Swish and Spit two times a day  fentaNYL   Infusion 0.1 MICROgram(s)/kG/Hr (0.616 mL/Hr) IV Continuous <Continuous>  gabapentin 100 milliGRAM(s) Oral three times a day  heparin  Injectable 5000 Unit(s) SubCutaneous every 8 hours  midodrine 10 milliGRAM(s) Oral three times a day  norepinephrine Infusion 0.02 MICROgram(s)/kG/Min (2.31 mL/Hr) IV Continuous <Continuous>  pantoprazole   Suspension 40 milliGRAM(s) Oral before breakfast  polyethylene glycol 3350 17 Gram(s) Oral daily  sodium chloride 0.9% Bolus 500 milliLiter(s) IV Bolus once  sodium chloride 0.9% Bolus 500 milliLiter(s) IV Bolus once  sodium chloride 0.9%. 1000 milliLiter(s) (75 mL/Hr) IV Continuous <Continuous>    MEDICATIONS  (PRN):  acetaminophen  Suppository 650 milliGRAM(s) Rectal every 6 hours PRN For Temp greater than 38 C (100.4 F)  lactulose Syrup 15 Gram(s) Oral every 4 hours PRN constipation          Xrays:  TLC:  OG:  ET tube:                                                                                       ECHO:    IMPRESSION:      PLAN:    CNS:    HEENT:    PULMONARY:    CARDIOVASCULAR:    GI: GI prophylaxis.  Feeding     RENAL:    INFECTIOUS DISEASE:    HEMATOLOGICAL:  DVT prophylaxis.    ENDOCRINE:  Follow up FS.  Insulin protocol if needed.    MUSCULOSKELETAL:

## 2018-04-25 NOTE — PROGRESS NOTE ADULT - ASSESSMENT
68yFemale being evaluated for goals of care and support.    Onc input noted.    Daughter is guarded and making decisions for patient. Defers further Palliative Care discussion,  At this time she agrees to continue current level of care as advised by Pulmonary team.     Recommendations:  Ongoing ICU mngmnt  Primary team to consider goals of care discussion/family meeting  Overall poor prognosis    We will follow  Call [phone] prn

## 2018-04-25 NOTE — PROGRESS NOTE ADULT - SUBJECTIVE AND OBJECTIVE BOX
68yFemale with diagnosis: PNEUMONIA;SEVERE SEPSIS;PLEURAL EFFUSION      Patient seen, remains orally intubated, R pleural effusion noted and discussed w Pulm fellow today, considering thoracentesis. Sedation on hold, patient awake/communicating w daughter at bedside.        PHYSICAL EXAM unchanged    T(C): , Max: 37.8 (00:00)  T(F): 98.9  HR: 90 (80 - 124)  BP: 96/60 (79/50 - 136/78)  RR: 17 (13 - 38)  SpO2: 100% (95% - 100%)              LABS:                          8.1    43.66 )-----------( 234      ( 25 Apr 2018 04:20 )             24.6                                                                                      04-25    137  |  102  |  49<H>  ----------------------------<  109<H>  5.5<H>   |  27  |  1.7<H>    Ca    7.9<L>      25 Apr 2018 04:20                                                        MEDICATIONS  (STANDING):  chlorhexidine 0.12% Liquid 15 milliLiter(s) Swish and Spit two times a day  fentaNYL   Infusion 0.1 MICROgram(s)/kG/Hr (0.616 mL/Hr) IV Continuous <Continuous>  gabapentin 100 milliGRAM(s) Oral three times a day  heparin  Injectable 5000 Unit(s) SubCutaneous every 8 hours  midodrine 10 milliGRAM(s) Oral three times a day  norepinephrine Infusion 0.02 MICROgram(s)/kG/Min (2.31 mL/Hr) IV Continuous <Continuous>  pantoprazole   Suspension 40 milliGRAM(s) Oral before breakfast  polyethylene glycol 3350 17 Gram(s) Oral daily  sodium chloride 0.9% Bolus 500 milliLiter(s) IV Bolus once  sodium chloride 0.9% Bolus 500 milliLiter(s) IV Bolus once  sodium chloride 0.9%. 1000 milliLiter(s) (75 mL/Hr) IV Continuous <Continuous>    MEDICATIONS  (PRN):  acetaminophen  Suppository 650 milliGRAM(s) Rectal every 6 hours PRN For Temp greater than 38 C (100.4 F)  lactulose Syrup 15 Gram(s) Oral every 4 hours PRN constipation

## 2018-04-25 NOTE — PROGRESS NOTE ADULT - ASSESSMENT
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  lost to f/u after that.  admitted with cough ,completed  abx for PNA.  Left iartogenic pneumothorax,intubated for acute resp failure,s/p chest tube.  Pulmonary edema   Bilateral pleural effusions.  R > L.  RO parapneumonic effusions VS malignant effusion   repeat CT chest showed rt pl effusion,consider drainage,send for fluid cytology  Anemia likely multifactorial  transfuse to keep hb >7  check ferritin/iron profile/retic count  worsening leukocytosis.  on abx,f/u with ID  Colon cancer with peritoneal mets ,Bx proven ,5 months back,did not get any Rx as she refused. lost to f/u , .now CT scan showing liver and pancreatic mass with carcinometosis,ascites,likley due to advance metastatic dz  further Mn for colon cancer when clinically more stable. will need pet/ct if she agrees and overall clinical condition is stable for further Rx.  overall prognosis is very poor.  explained to her partner at bedside Mr Marks at length yesterday,expalined again today to her daughter at bedside.  informed pulm fellow  cont other supportive care.  will f/u

## 2018-04-25 NOTE — CHART NOTE - NSCHARTNOTEFT_GEN_A_CORE
Palliative Care Spiritual and Psychosocial Assessment  2:00 pm    I was an empathetic presence for Patient. Pt's daughter was present for the visit.  Pt. is non commumicative and daughter is very dismissive.    No plans to follow up.

## 2018-04-25 NOTE — PROGRESS NOTE ADULT - SUBJECTIVE AND OBJECTIVE BOX
Patient is a 68y old  Female who presents with a chief complaint of sob, temperature, dec. BP (16 Apr 2018 05:53)       Pt is seen and examined  pt is awake and lying in bed  pt seems comfortable and denies any complaints at this time          ROS:  unable to obtain    MEDICATIONS  (STANDING):  chlorhexidine 0.12% Liquid 15 milliLiter(s) Swish and Spit two times a day  fentaNYL   Infusion 0.1 MICROgram(s)/kG/Hr (0.616 mL/Hr) IV Continuous <Continuous>  gabapentin 100 milliGRAM(s) Oral three times a day  heparin  Injectable 5000 Unit(s) SubCutaneous every 8 hours  midodrine 10 milliGRAM(s) Oral three times a day  norepinephrine Infusion 0.02 MICROgram(s)/kG/Min (2.31 mL/Hr) IV Continuous <Continuous>  pantoprazole   Suspension 40 milliGRAM(s) Oral before breakfast  polyethylene glycol 3350 17 Gram(s) Oral daily  sodium chloride 0.9% Bolus 500 milliLiter(s) IV Bolus once  sodium chloride 0.9% Bolus 500 milliLiter(s) IV Bolus once  sodium chloride 0.9%. 1000 milliLiter(s) (75 mL/Hr) IV Continuous <Continuous>    MEDICATIONS  (PRN):  acetaminophen  Suppository 650 milliGRAM(s) Rectal every 6 hours PRN For Temp greater than 38 C (100.4 F)  lactulose Syrup 15 Gram(s) Oral every 4 hours PRN constipation      Allergies    No Known Allergies    Intolerances        Vital Signs Last 24 Hrs  T(C): 36.9 (25 Apr 2018 12:00), Max: 37.8 (25 Apr 2018 00:00)  T(F): 98.4 (25 Apr 2018 12:00), Max: 100 (25 Apr 2018 00:00)  HR: 100 (25 Apr 2018 14:00) (80 - 124)  BP: 110/70 (25 Apr 2018 14:00) (79/50 - 136/78)  BP(mean): 83 (25 Apr 2018 14:00) (57 - 111)  RR: 22 (25 Apr 2018 14:00) (13 - 38)  SpO2: 97% (25 Apr 2018 14:00) (96% - 100%)    PHYSICAL EXAM  General: awake  HEENT:orally intubated  Neck: supple  CV: normal S1/S2 with no murmur rubs or gallops  Lungs: positive air movement b/l ant lungs,clear to auscultation, no wheezes, no rales  Abdomen: soft non-tender non-distended, no hepatosplenomegaly  Ext: no clubbing cyanosis or edema  Skin: no rashes and no petechiae  Neuro: alert   LABS:                          8.1    43.66 )-----------( 234      ( 25 Apr 2018 04:20 )             24.6         Mean Cell Volume : 91.1 fL  Mean Cell Hemoglobin : 30.0 pg  Mean Cell Hemoglobin Concentration : 32.9 g/dL  Auto Neutrophil # : 41.39 K/uL  Auto Lymphocyte # : 1.14 K/uL  Auto Monocyte # : 1.14 K/uL  Auto Eosinophil # : 0.00 K/uL  Auto Basophil # : 0.00 K/uL  Auto Neutrophil % : 90.4 %  Auto Lymphocyte % : 2.6 %  Auto Monocyte % : 2.6 %  Auto Eosinophil % : 0.0 %  Auto Basophil % : 0.0 %    Serial CBC's  04-25 @ 04:20  Hct-24.6 / Hgb-8.1 / Plat-234 / RBC-2.70 / WBC-43.66          Serial CBC's  04-24 @ 05:01  Hct-26.7 / Hgb-8.7 / Plat-237 / RBC-2.93 / WBC-24.64          Serial CBC's  04-23 @ 04:50  Hct-21.9 / Hgb-6.8 / Plat-244 / RBC-2.37 / WBC-18.10          Serial CBC's  04-22 @ 04:56  Hct-21.3 / Hgb-7.1 / Plat-245 / RBC-2.37 / WBC-9.83            04-25    137  |  102  |  49<H>  ----------------------------<  109<H>  5.5<H>   |  27  |  1.7<H>    Ca    7.9<L>      25 Apr 2018 04:20            Hematocrit: 24.6 % (04-25-18 @ 04:20)  Platelet Count - Automated: 234 K/uL (04-25-18 @ 04:20)  Hemoglobin: 8.1 g/dL (04-25-18 @ 04:20)  WBC Count: 43.66 K/uL (04-25-18 @ 04:20)  Platelet Count - Automated: 237 K/uL (04-24-18 @ 05:01)  Hemoglobin: 8.7 g/dL (04-24-18 @ 05:01)  WBC Count: 24.64 K/uL (04-24-18 @ 05:01)  Hematocrit: 26.7 % (04-24-18 @ 05:01)  Hemoglobin: 6.8 g/dL (04-23-18 @ 04:50)  WBC Count: 18.10 K/uL (04-23-18 @ 04:50)  Platelet Count - Automated: 244 K/uL (04-23-18 @ 04:50)  Hematocrit: 21.9 % (04-23-18 @ 04:50)  Hemoglobin: 7.1 g/dL (04-22-18 @ 04:56)  WBC Count: 9.83 K/uL (04-22-18 @ 04:56)  Platelet Count - Automated: 245 K/uL (04-22-18 @ 04:56)  Hematocrit: 21.3 % (04-22-18 @ 04:56)  WBC Count: 9.01 K/uL (04-21-18 @ 04:57)  Platelet Count - Automated: 254 K/uL (04-21-18 @ 04:57)  Hematocrit: 21.6 % (04-21-18 @ 04:57)  Hemoglobin: 7.1 g/dL (04-21-18 @ 04:57)  Platelet Count - Automated: 289 K/uL (04-20-18 @ 05:59)  Hemoglobin: 7.6 g/dL (04-20-18 @ 05:59)  WBC Count: 11.83 K/uL (04-20-18 @ 05:59)  Hematocrit: 22.9 % (04-20-18 @ 05:59)  WBC Count: 13.13 K/uL (04-20-18 @ 02:42)  Hematocrit: 23.3 % (04-20-18 @ 02:42)  Platelet Count - Automated: 303 K/uL (04-20-18 @ 02:42)  Hemoglobin: 7.6 g/dL (04-20-18 @ 02:42)  Hematocrit: 24.3 % (04-19-18 @ 05:27)  Hemoglobin: 8.0 g/dL (04-19-18 @ 05:27)  WBC Count: 18.38 K/uL (04-19-18 @ 05:27)  Platelet Count - Automated: 285 K/uL (04-19-18 @ 05:27)  Hemoglobin: 8.1 g/dL (04-18-18 @ 04:30)  WBC Count: 15.06 K/uL (04-18-18 @ 04:30)  Hematocrit: 24.5 % (04-18-18 @ 04:30)  Platelet Count - Automated: 297 K/uL (04-18-18 @ 04:30)  Platelet Count - Automated: 311 K/uL (04-17-18 @ 04:44)  Hematocrit: 25.2 % (04-17-18 @ 04:44)  Hemoglobin: 8.1 g/dL (04-17-18 @ 04:44)  WBC Count: 14.67 K/uL (04-17-18 @ 04:44)  Hematocrit: 24.8 % (04-16-18 @ 04:22)  Platelet Count - Automated: 324 K/uL (04-16-18 @ 04:22)  Hemoglobin: 8.1 g/dL (04-16-18 @ 04:22)  WBC Count: 13.47 K/uL (04-16-18 @ 04:22)  Platelet Count - Automated: 358 K/uL (04-15-18 @ 18:10)  Hematocrit: 27.9 % (04-15-18 @ 18:10)  Hemoglobin: 8.9 g/dL (04-15-18 @ 18:10)  WBC Count: 12.74 K/uL (04-15-18 @ 18:10)                BLOOD SMEAR INTERPRETATION:       RADIOLOGY & ADDITIONAL STUDIES:

## 2018-04-25 NOTE — PROGRESS NOTE ADULT - ASSESSMENT
I spoke to Dr. SA JR NOVA at approximately 11 AM.  We reviewed the CAT scan together.  There is no pneumothorax.  There is right pleural effusion.  We may developed both diagnostic and therapeutic benefit from a tap of the right chest which we will perform.  Continue ventilatory support.

## 2018-04-26 NOTE — PROGRESS NOTE ADULT - ASSESSMENT
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  lost to f/u after that.  admitted with cough ,completed  abx for PNA.  Left iartogenic pneumothorax,intubated for acute resp failure,s/p chest tube.  Pulmonary edema   Bilateral pleural effusions.  R > L.  RO parapneumonic effusions VS malignant effusion   repeat CT chest showed rt pl effusion,s/p chest tube,f/u fluid cytology  Anemia likely multifactorial  transfuse to keep hb >7  check ferritin/iron profile/retic count   leukocytosis,trending down ,likely reactive  on abx,f/u with ID  Colon cancer with peritoneal mets ,Bx proven ,5 months back,did not get any Rx as she refused. lost to f/u , .now CT scan showing liver and pancreatic mass with carcinometosis,ascites,likley due to advance metastatic dz  further Mn for colon cancer when clinically more stable. will need pet/ct if she agrees and overall clinical condition is stable for further Rx.  overall prognosis is very poor.  explained to her partner at bedside Mr Marks on 24th,,expalined again yesterday to her daughter at bedside.  family is aware of her metastatic cancer and poor prognosis  informed pulm fellow yesterday  cont other supportive care.  will f/u

## 2018-04-26 NOTE — PROGRESS NOTE ADULT - SUBJECTIVE AND OBJECTIVE BOX
Patient is a 68y old Female who presents with a chief complaint of sob, temperature, dec. BP (16 Apr 2018 05:53)    Currently admitted to medicine with the primary diagnosis of Pneumonia    Today is hospital day 10d.    BRIEF HOSPITAL COURSE:         EVENTS LAST 24HRS:     PAST MEDICAL & SURGICAL HISTORY  Depression, unspecified depression type  Anxiety  Malignant neoplasm of colon, unspecified part of colon  HTN (hypertension)  COPD (chronic obstructive pulmonary disease)      SOCIAL HISTORY:      REVIEW OF SYSTEMS:  See HPI    ALLERGIES:    MEDICATIONS:  STANDING MEDICATIONS  chlorhexidine 0.12% Liquid 15 milliLiter(s) Swish and Spit two times a day  fentaNYL   Infusion 0.1 MICROgram(s)/kG/Hr IV Continuous <Continuous>  gabapentin 100 milliGRAM(s) Oral three times a day  heparin  Injectable 5000 Unit(s) SubCutaneous every 8 hours  midodrine 10 milliGRAM(s) Oral three times a day  norepinephrine Infusion 0.02 MICROgram(s)/kG/Min IV Continuous <Continuous>  pantoprazole   Suspension 40 milliGRAM(s) Oral before breakfast  polyethylene glycol 3350 17 Gram(s) Oral daily  sodium chloride 0.9% Bolus 500 milliLiter(s) IV Bolus once  sodium chloride 0.9% Bolus 500 milliLiter(s) IV Bolus once    PRN MEDICATIONS  acetaminophen  Suppository 650 milliGRAM(s) Rectal every 6 hours PRN  lactulose Syrup 15 Gram(s) Oral every 4 hours PRN    VITALS:     Mode: AC/ CMV (Assist Control/ Continuous Mandatory Ventilation)  RR (machine): 16  TV (machine): 350  FiO2: 40  PEEP: 5  ITime: 1  MAP: 14  PIP: 34      ICU Vital Signs Last 24 Hrs:    T(C): 36.8 (26 Apr 2018 08:00), Max: 37.6 (25 Apr 2018 20:00)  T(F): 98.3 (26 Apr 2018 08:00), Max: 99.6 (25 Apr 2018 20:00)  HR: 76 (26 Apr 2018 08:00) (76 - 108)  BP: 81/53 (26 Apr 2018 08:00) (79/53 - 133/85)  BP(mean): 61 (26 Apr 2018 08:00) (52 - 106)  ABP: --  ABP(mean): --  RR: 14 (26 Apr 2018 08:00) (14 - 32)  SpO2: 100% (26 Apr 2018 08:00) (95% - 100%)      ABG - ( 26 Apr 2018 07:25 )  pH, Arterial: 7.33  pH, Blood: x     /  pCO2: 47    /  pO2: 135   / HCO3: 25    / Base Excess: -1.4  /  SaO2: 100                 I&O's Detail:      25 Apr 2018 07:01  -  26 Apr 2018 07:00  --------------------------------------------------------  IN:    Enteral Tube Flush: 170 mL    fentaNYL  Infusion: 202.4 mL    Osmolite: 1035 mL    sodium chloride 0.9%: 1800 mL  Total IN: 3207.4 mL    OUT:    Chest Tube: 20 mL    Chest Tube: 1000 mL    Indwelling Catheter - Urethral: 248 mL  Total OUT: 1268 mL    Total NET: 1939.4 mL          LABS:                        7.8    34.46 )-----------( 233      ( 26 Apr 2018 05:03 )             24.6     04-26    139  |  103  |  50<H>  ----------------------------<  98  5.3<H>   |  26  |  1.4    Ca    7.9<L>      26 Apr 2018 05:03            CAPILLARY BLOOD GLUCOSE            CULTURES:      PHYSICAL EXAM:    General: No acute distress.  Alert, oriented, interactive, nonfocal.    HEENT: Pupils equal, reactive to light symmetrically.    PULM: Clear to auscultation bilaterally, no significant sputum production.    CVS: Regular rate and rhythm, no murmurs, rubs, or gallops.    ABD: Soft, nondistended, nontender, no masses.    EXT: No edema, nontender.    SKIN: Warm and well perfused, no rashes noted.    RADIOLOGY: Patient is a 68y old Female who presents with a chief complaint of sob, temperature, dec. BP (16 Apr 2018 05:53)    Currently admitted to medicine with the primary diagnosis of Pneumonia    Today is hospital day 10d.    BRIEF HOSPITAL COURSE:     68 Y F [facility] temporary resident for rehab with PMH of anxiety/depression, h/o colon Ca s/p R hemicolectomy not on current treatment, COPD not on home O2 and HTN was brought to the ED to r/o PNA. Developed acute hypoxic respiratory failure requiring intubation. Patient found to have pleural effusion R>L s/p thoracocentesis with removal of 1L of fluid. Was extubated but subsequently re-intubated within 24 hours. After re-intubation, she was hypotensive requiring pressors. During central line placement in left IJ, patient developed iatrogenic pneumothorax of left lung with subsequent chest tube insertion.     EVENTS LAST 24HRS:     Patient s/p thoracocentesis (second one) with removal of     PAST MEDICAL & SURGICAL HISTORY  Depression, unspecified depression type  Anxiety  Malignant neoplasm of colon, unspecified part of colon  HTN (hypertension)  COPD (chronic obstructive pulmonary disease)      SOCIAL HISTORY:      REVIEW OF SYSTEMS:  See HPI    ALLERGIES:    MEDICATIONS:  STANDING MEDICATIONS  chlorhexidine 0.12% Liquid 15 milliLiter(s) Swish and Spit two times a day  fentaNYL   Infusion 0.1 MICROgram(s)/kG/Hr IV Continuous <Continuous>  gabapentin 100 milliGRAM(s) Oral three times a day  heparin  Injectable 5000 Unit(s) SubCutaneous every 8 hours  midodrine 10 milliGRAM(s) Oral three times a day  norepinephrine Infusion 0.02 MICROgram(s)/kG/Min IV Continuous <Continuous>  pantoprazole   Suspension 40 milliGRAM(s) Oral before breakfast  polyethylene glycol 3350 17 Gram(s) Oral daily  sodium chloride 0.9% Bolus 500 milliLiter(s) IV Bolus once  sodium chloride 0.9% Bolus 500 milliLiter(s) IV Bolus once    PRN MEDICATIONS  acetaminophen  Suppository 650 milliGRAM(s) Rectal every 6 hours PRN  lactulose Syrup 15 Gram(s) Oral every 4 hours PRN    VITALS:     Mode: AC/ CMV (Assist Control/ Continuous Mandatory Ventilation)  RR (machine): 16  TV (machine): 350  FiO2: 40  PEEP: 5  ITime: 1  MAP: 14  PIP: 34      ICU Vital Signs Last 24 Hrs:    T(C): 36.8 (26 Apr 2018 08:00), Max: 37.6 (25 Apr 2018 20:00)  T(F): 98.3 (26 Apr 2018 08:00), Max: 99.6 (25 Apr 2018 20:00)  HR: 76 (26 Apr 2018 08:00) (76 - 108)  BP: 81/53 (26 Apr 2018 08:00) (79/53 - 133/85)  BP(mean): 61 (26 Apr 2018 08:00) (52 - 106)  ABP: --  ABP(mean): --  RR: 14 (26 Apr 2018 08:00) (14 - 32)  SpO2: 100% (26 Apr 2018 08:00) (95% - 100%)      ABG - ( 26 Apr 2018 07:25 )  pH, Arterial: 7.33  pH, Blood: x     /  pCO2: 47    /  pO2: 135   / HCO3: 25    / Base Excess: -1.4  /  SaO2: 100                 I&O's Detail:      25 Apr 2018 07:01  -  26 Apr 2018 07:00  --------------------------------------------------------  IN:    Enteral Tube Flush: 170 mL    fentaNYL  Infusion: 202.4 mL    Osmolite: 1035 mL    sodium chloride 0.9%: 1800 mL  Total IN: 3207.4 mL    OUT:    Chest Tube: 20 mL    Chest Tube: 1000 mL    Indwelling Catheter - Urethral: 248 mL  Total OUT: 1268 mL    Total NET: 1939.4 mL          LABS:                        7.8    34.46 )-----------( 233      ( 26 Apr 2018 05:03 )             24.6     04-26    139  |  103  |  50<H>  ----------------------------<  98  5.3<H>   |  26  |  1.4    Ca    7.9<L>      26 Apr 2018 05:03            CAPILLARY BLOOD GLUCOSE            CULTURES:      PHYSICAL EXAM:    General: No acute distress.  Alert, oriented, interactive, nonfocal.    HEENT: Pupils equal, reactive to light symmetrically.    PULM: Clear to auscultation bilaterally, no significant sputum production.    CVS: Regular rate and rhythm, no murmurs, rubs, or gallops.    ABD: Soft, nondistended, nontender, no masses.    EXT: No edema, nontender.    SKIN: Warm and well perfused, no rashes noted.    RADIOLOGY: Patient is a 68y old Female who presents with a chief complaint of sob, temperature, dec. BP (16 Apr 2018 05:53)    Currently admitted to medicine with the primary diagnosis of Pneumonia    Today is hospital day 10d.    BRIEF HOSPITAL COURSE:     68 Y F [facility] temporary resident for rehab with PMH of anxiety/depression, h/o colon Ca s/p R hemicolectomy not on current treatment, COPD not on home O2 and HTN was brought to the ED to r/o PNA. Developed acute hypoxic respiratory failure requiring intubation. Patient found to have pleural effusion R>L s/p thoracocentesis with removal of 1L of fluid. Was extubated but subsequently re-intubated within 24 hours. After re-intubation, she was hypotensive requiring pressors. During central line placement in left IJ, patient developed iatrogenic pneumothorax of left lung with subsequent chest tube insertion.     EVENTS LAST 24HRS:     Patient s/p thoracocentesis (second one) with removal of approx 1 L of fluid with right chest tube placement. CXR post drainage results: Interval placement of right-sided pigtail catheter with near resolution of   right pleural effusion improvement in right-sided opacities       PAST MEDICAL & SURGICAL HISTORY  Depression, unspecified depression type  Anxiety  Malignant neoplasm of colon, unspecified part of colon  HTN (hypertension)  COPD (chronic obstructive pulmonary disease)      SOCIAL HISTORY:      REVIEW OF SYSTEMS:  See HPI    ALLERGIES:    MEDICATIONS:  STANDING MEDICATIONS  chlorhexidine 0.12% Liquid 15 milliLiter(s) Swish and Spit two times a day  fentaNYL   Infusion 0.1 MICROgram(s)/kG/Hr IV Continuous <Continuous>  gabapentin 100 milliGRAM(s) Oral three times a day  heparin  Injectable 5000 Unit(s) SubCutaneous every 8 hours  midodrine 10 milliGRAM(s) Oral three times a day  norepinephrine Infusion 0.02 MICROgram(s)/kG/Min IV Continuous <Continuous>  pantoprazole   Suspension 40 milliGRAM(s) Oral before breakfast  polyethylene glycol 3350 17 Gram(s) Oral daily  sodium chloride 0.9% Bolus 500 milliLiter(s) IV Bolus once  sodium chloride 0.9% Bolus 500 milliLiter(s) IV Bolus once    PRN MEDICATIONS  acetaminophen  Suppository 650 milliGRAM(s) Rectal every 6 hours PRN  lactulose Syrup 15 Gram(s) Oral every 4 hours PRN    VITALS:     Mode: AC/ CMV (Assist Control/ Continuous Mandatory Ventilation)  RR (machine): 16  TV (machine): 350  FiO2: 40  PEEP: 5  ITime: 1  MAP: 14  PIP: 34      ICU Vital Signs Last 24 Hrs:    T(C): 36.8 (26 Apr 2018 08:00), Max: 37.6 (25 Apr 2018 20:00)  T(F): 98.3 (26 Apr 2018 08:00), Max: 99.6 (25 Apr 2018 20:00)  HR: 76 (26 Apr 2018 08:00) (76 - 108)  BP: 81/53 (26 Apr 2018 08:00) (79/53 - 133/85)  BP(mean): 61 (26 Apr 2018 08:00) (52 - 106)  ABP: --  ABP(mean): --  RR: 14 (26 Apr 2018 08:00) (14 - 32)  SpO2: 100% (26 Apr 2018 08:00) (95% - 100%)      ABG - ( 26 Apr 2018 07:25 )  pH, Arterial: 7.33  pH, Blood: x     /  pCO2: 47    /  pO2: 135   / HCO3: 25    / Base Excess: -1.4  /  SaO2: 100                 I&O's Detail:      25 Apr 2018 07:01  -  26 Apr 2018 07:00  --------------------------------------------------------  IN:    Enteral Tube Flush: 170 mL    fentaNYL  Infusion: 202.4 mL    Osmolite: 1035 mL    sodium chloride 0.9%: 1800 mL  Total IN: 3207.4 mL    OUT:    Chest Tube: 20 mL    Chest Tube: 1000 mL    Indwelling Catheter - Urethral: 248 mL  Total OUT: 1268 mL    Total NET: 1939.4 mL          LABS:                        7.8    34.46 )-----------( 233      ( 26 Apr 2018 05:03 )             24.6     04-26    139  |  103  |  50<H>  ----------------------------<  98  5.3<H>   |  26  |  1.4    Ca    7.9<L>      26 Apr 2018 05:03            CAPILLARY BLOOD GLUCOSE            CULTURES:      PHYSICAL EXAM:    General: No acute distress.  Alert, oriented, interactive, nonfocal.    HEENT: Pupils equal, reactive to light symmetrically.    PULM: Clear to auscultation bilaterally, no significant sputum production.    CVS: Regular rate and rhythm, no murmurs, rubs, or gallops.    ABD: Soft, nondistended, nontender, no masses.    EXT: No edema, nontender.    SKIN: Warm and well perfused, no rashes noted.    RADIOLOGY: Patient is a 68y old Female who presents with a chief complaint of sob, temperature, dec. BP (16 Apr 2018 05:53)    Currently admitted to medicine with the primary diagnosis of Pneumonia    Today is hospital day 10d.    BRIEF HOSPITAL COURSE:     68 Y F [facility] temporary resident for rehab with PMH of anxiety/depression, h/o colon Ca s/p R hemicolectomy not on current treatment, COPD not on home O2 and HTN was brought to the ED to r/o PNA. Developed acute hypoxic respiratory failure requiring intubation. Patient found to have pleural effusion R>L s/p thoracocentesis with removal of 1L of fluid. Was extubated but subsequently re-intubated within 24 hours. After re-intubation, she was hypotensive requiring pressors. During central line placement in left IJ, patient developed iatrogenic pneumothorax of left lung with subsequent chest tube insertion.     EVENTS LAST 24HRS:     Patient s/p thoracocentesis (second one) with removal of approx 1 L of fluid with right chest tube placement. CXR post drainage results: Interval placement of right-sided pigtail catheter with near resolution of right pleural effusion improvement in right-sided opacities.       PAST MEDICAL & SURGICAL HISTORY  Depression, unspecified depression type  Anxiety  Malignant neoplasm of colon, unspecified part of colon  HTN (hypertension)  COPD (chronic obstructive pulmonary disease)      SOCIAL HISTORY:      REVIEW OF SYSTEMS:  See HPI    ALLERGIES:    MEDICATIONS:  STANDING MEDICATIONS  chlorhexidine 0.12% Liquid 15 milliLiter(s) Swish and Spit two times a day  fentaNYL   Infusion 0.1 MICROgram(s)/kG/Hr IV Continuous <Continuous>  gabapentin 100 milliGRAM(s) Oral three times a day  heparin  Injectable 5000 Unit(s) SubCutaneous every 8 hours  midodrine 10 milliGRAM(s) Oral three times a day  norepinephrine Infusion 0.02 MICROgram(s)/kG/Min IV Continuous <Continuous>  pantoprazole   Suspension 40 milliGRAM(s) Oral before breakfast  polyethylene glycol 3350 17 Gram(s) Oral daily  sodium chloride 0.9% Bolus 500 milliLiter(s) IV Bolus once  sodium chloride 0.9% Bolus 500 milliLiter(s) IV Bolus once    PRN MEDICATIONS  acetaminophen  Suppository 650 milliGRAM(s) Rectal every 6 hours PRN  lactulose Syrup 15 Gram(s) Oral every 4 hours PRN    VITALS:     Mode: AC/ CMV (Assist Control/ Continuous Mandatory Ventilation)  RR (machine): 16  TV (machine): 350  FiO2: 40  PEEP: 5  ITime: 1  MAP: 14  PIP: 34      ICU Vital Signs Last 24 Hrs:    T(C): 36.8 (26 Apr 2018 08:00), Max: 37.6 (25 Apr 2018 20:00)  T(F): 98.3 (26 Apr 2018 08:00), Max: 99.6 (25 Apr 2018 20:00)  HR: 76 (26 Apr 2018 08:00) (76 - 108)  BP: 81/53 (26 Apr 2018 08:00) (79/53 - 133/85)  BP(mean): 61 (26 Apr 2018 08:00) (52 - 106)  ABP: --  ABP(mean): --  RR: 14 (26 Apr 2018 08:00) (14 - 32)  SpO2: 100% (26 Apr 2018 08:00) (95% - 100%)      ABG - ( 26 Apr 2018 07:25 )  pH, Arterial: 7.33  pH, Blood: x     /  pCO2: 47    /  pO2: 135   / HCO3: 25    / Base Excess: -1.4  /  SaO2: 100                 I&O's Detail:      25 Apr 2018 07:01  -  26 Apr 2018 07:00  --------------------------------------------------------  IN:    Enteral Tube Flush: 170 mL    fentaNYL  Infusion: 202.4 mL    Osmolite: 1035 mL    sodium chloride 0.9%: 1800 mL  Total IN: 3207.4 mL    OUT:    Chest Tube: 20 mL    Chest Tube: 1000 mL    Indwelling Catheter - Urethral: 248 mL  Total OUT: 1268 mL    Total NET: 1939.4 mL          LABS:                        7.8    34.46 )-----------( 233      ( 26 Apr 2018 05:03 )             24.6     04-26    139  |  103  |  50<H>  ----------------------------<  98  5.3<H>   |  26  |  1.4    Ca    7.9<L>      26 Apr 2018 05:03            CAPILLARY BLOOD GLUCOSE            CULTURES:      PHYSICAL EXAM:    General: No acute distress.  Alert, oriented, interactive, nonfocal.    HEENT: Pupils equal, reactive to light symmetrically.    PULM: Clear to auscultation bilaterally, no significant sputum production.    CVS: Regular rate and rhythm, no murmurs, rubs, or gallops.    ABD: Soft, nondistended, nontender, no masses.    EXT: No edema, nontender.    SKIN: Warm and well perfused, no rashes noted.    RADIOLOGY: Patient is a 68y old Female who presents with a chief complaint of sob, temperature, dec. BP (16 Apr 2018 05:53)    Currently admitted to medicine with the primary diagnosis of Pneumonia    Today is hospital day 10d.    BRIEF HOSPITAL COURSE:     68 Y F [facility] temporary resident for rehab with PMH of anxiety/depression, h/o colon Ca s/p R hemicolectomy not on current treatment, COPD not on home O2 and HTN was brought to the ED to r/o PNA. Developed acute hypoxic respiratory failure requiring intubation. Patient found to have pleural effusion R>L s/p thoracocentesis with removal of 1L of fluid. Was extubated but subsequently re-intubated within 24 hours. After re-intubation, she was hypotensive requiring pressors. During central line placement in left IJ, patient developed iatrogenic pneumothorax of left lung with subsequent chest tube insertion.     EVENTS LAST 24HRS:     Patient s/p thoracocentesis (second one) with removal of approx 1 L of fluid with right chest tube placement. CXR post drainage results: Interval placement of right-sided pigtail catheter with near resolution of right pleural effusion improvement in right-sided opacities.       PAST MEDICAL & SURGICAL HISTORY  Depression, unspecified depression type  Anxiety  Malignant neoplasm of colon, unspecified part of colon  HTN (hypertension)  COPD (chronic obstructive pulmonary disease)      SOCIAL HISTORY:      REVIEW OF SYSTEMS:  See HPI    ALLERGIES:    MEDICATIONS:  STANDING MEDICATIONS  chlorhexidine 0.12% Liquid 15 milliLiter(s) Swish and Spit two times a day  fentaNYL   Infusion 0.1 MICROgram(s)/kG/Hr IV Continuous <Continuous>  gabapentin 100 milliGRAM(s) Oral three times a day  heparin  Injectable 5000 Unit(s) SubCutaneous every 8 hours  midodrine 10 milliGRAM(s) Oral three times a day  norepinephrine Infusion 0.02 MICROgram(s)/kG/Min IV Continuous <Continuous>  pantoprazole   Suspension 40 milliGRAM(s) Oral before breakfast  polyethylene glycol 3350 17 Gram(s) Oral daily  sodium chloride 0.9% Bolus 500 milliLiter(s) IV Bolus once  sodium chloride 0.9% Bolus 500 milliLiter(s) IV Bolus once    PRN MEDICATIONS  acetaminophen  Suppository 650 milliGRAM(s) Rectal every 6 hours PRN  lactulose Syrup 15 Gram(s) Oral every 4 hours PRN    VITALS:     Mode: AC/ CMV (Assist Control/ Continuous Mandatory Ventilation)  RR (machine): 16  TV (machine): 350  FiO2: 40  PEEP: 5  ITime: 1  MAP: 14  PIP: 34      ICU Vital Signs Last 24 Hrs:    T(C): 36.8 (26 Apr 2018 08:00), Max: 37.6 (25 Apr 2018 20:00)  T(F): 98.3 (26 Apr 2018 08:00), Max: 99.6 (25 Apr 2018 20:00)  HR: 76 (26 Apr 2018 08:00) (76 - 108)  BP: 81/53 (26 Apr 2018 08:00) (79/53 - 133/85)  BP(mean): 61 (26 Apr 2018 08:00) (52 - 106)  ABP: --  ABP(mean): --  RR: 14 (26 Apr 2018 08:00) (14 - 32)  SpO2: 100% (26 Apr 2018 08:00) (95% - 100%)      ABG - ( 26 Apr 2018 07:25 )  pH, Arterial: 7.33  pH, Blood: x     /  pCO2: 47    /  pO2: 135   / HCO3: 25    / Base Excess: -1.4  /  SaO2: 100                 I&O's Detail:      25 Apr 2018 07:01  -  26 Apr 2018 07:00  --------------------------------------------------------  IN:    Enteral Tube Flush: 170 mL    fentaNYL  Infusion: 202.4 mL    Osmolite: 1035 mL    sodium chloride 0.9%: 1800 mL  Total IN: 3207.4 mL    OUT:    Chest Tube: 20 mL    Chest Tube: 1000 mL    Indwelling Catheter - Urethral: 248 mL  Total OUT: 1268 mL    Total NET: 1939.4 mL          LABS:                        7.8    34.46 )-----------( 233      ( 26 Apr 2018 05:03 )             24.6     04-26    139  |  103  |  50<H>  ----------------------------<  98  5.3<H>   |  26  |  1.4    Ca    7.9<L>      26 Apr 2018 05:03            CAPILLARY BLOOD GLUCOSE            CULTURES:      PHYSICAL EXAM:    General: No acute distress.  Alert, and follows commands off sedation     HEENT: Pupils equal, reactive to light symmetrically.    PULM: Clear to auscultation bilaterally, no significant sputum production.    CVS: Regular rate and rhythm, no murmurs, rubs, or gallops.    ABD: Hard, Distended, nontender, no masses.    EXT: Anasarca with palpable subcutaneous emphysema on chest     SKIN: Warm and well perfused, no rashes noted.    RADIOLOGY:     CNS: fentanyl    HEENT: Oral care    PULMONARY:  HOB @ 45 degrees. Keep chest tube to suction ; f/u CT Chest Non Contrast    CARDIOVASCULAR: I>O , c/w LR bolus prn, On Midodrine 10 mg Q 8hrs, Wean off levophed    GI: GI ppx, OG tube feeds    RENAL:  monitor lytes and replete prn, low urine output     INFECTIOUS DISEASE: Completed antibiotic course (last day abx 4/23); Now with elevated white count, could be in setting of recurrent effusions      HEMATOLOGICAL:  SLIM dvt ppx    ENDOCRINE:  Follow up FS.  Insulin protocol if needed.    Prognosis poor Patient is a 68y old Female who presents with a chief complaint of sob, temperature, dec. BP (16 Apr 2018 05:53)    Currently admitted to medicine with the primary diagnosis of Pneumonia    Today is hospital day 10d.    BRIEF HOSPITAL COURSE:     68 Y F [facility] temporary resident for rehab with PMH of anxiety/depression, h/o colon Ca s/p R hemicolectomy not on current treatment, COPD not on home O2 and HTN was brought to the ED to r/o PNA. Developed acute hypoxic respiratory failure requiring intubation. Patient found to have pleural effusion R>L s/p thoracocentesis with removal of 1L of fluid. Was extubated but subsequently re-intubated within 24 hours. After re-intubation, she was hypotensive requiring pressors. During central line placement in left IJ, patient developed iatrogenic pneumothorax of left lung with subsequent chest tube insertion.     EVENTS LAST 24HRS:     Patient s/p thoracocentesis (second one) with removal of approx 1 L of fluid with right chest tube placement. CXR post drainage results: Interval placement of right-sided pigtail catheter with near resolution of right pleural effusion improvement in right-sided opacities.       PAST MEDICAL & SURGICAL HISTORY  Depression, unspecified depression type  Anxiety  Malignant neoplasm of colon, unspecified part of colon  HTN (hypertension)  COPD (chronic obstructive pulmonary disease)      SOCIAL HISTORY:      REVIEW OF SYSTEMS:  See HPI    ALLERGIES:    MEDICATIONS:  STANDING MEDICATIONS  chlorhexidine 0.12% Liquid 15 milliLiter(s) Swish and Spit two times a day  fentaNYL   Infusion 0.1 MICROgram(s)/kG/Hr IV Continuous <Continuous>  gabapentin 100 milliGRAM(s) Oral three times a day  heparin  Injectable 5000 Unit(s) SubCutaneous every 8 hours  midodrine 10 milliGRAM(s) Oral three times a day  norepinephrine Infusion 0.02 MICROgram(s)/kG/Min IV Continuous <Continuous>  pantoprazole   Suspension 40 milliGRAM(s) Oral before breakfast  polyethylene glycol 3350 17 Gram(s) Oral daily  sodium chloride 0.9% Bolus 500 milliLiter(s) IV Bolus once  sodium chloride 0.9% Bolus 500 milliLiter(s) IV Bolus once    PRN MEDICATIONS  acetaminophen  Suppository 650 milliGRAM(s) Rectal every 6 hours PRN  lactulose Syrup 15 Gram(s) Oral every 4 hours PRN    VITALS:     Mode: AC/ CMV (Assist Control/ Continuous Mandatory Ventilation)  RR (machine): 16  TV (machine): 350  FiO2: 40  PEEP: 5  ITime: 1  MAP: 14  PIP: 34      ICU Vital Signs Last 24 Hrs:    T(C): 36.8 (26 Apr 2018 08:00), Max: 37.6 (25 Apr 2018 20:00)  T(F): 98.3 (26 Apr 2018 08:00), Max: 99.6 (25 Apr 2018 20:00)  HR: 76 (26 Apr 2018 08:00) (76 - 108)  BP: 81/53 (26 Apr 2018 08:00) (79/53 - 133/85)  BP(mean): 61 (26 Apr 2018 08:00) (52 - 106)  ABP: --  ABP(mean): --  RR: 14 (26 Apr 2018 08:00) (14 - 32)  SpO2: 100% (26 Apr 2018 08:00) (95% - 100%)      ABG - ( 26 Apr 2018 07:25 )  pH, Arterial: 7.33  pH, Blood: x     /  pCO2: 47    /  pO2: 135   / HCO3: 25    / Base Excess: -1.4  /  SaO2: 100                 I&O's Detail:      25 Apr 2018 07:01  -  26 Apr 2018 07:00  --------------------------------------------------------  IN:    Enteral Tube Flush: 170 mL    fentaNYL  Infusion: 202.4 mL    Osmolite: 1035 mL    sodium chloride 0.9%: 1800 mL  Total IN: 3207.4 mL    OUT:    Chest Tube: 20 mL    Chest Tube: 1000 mL    Indwelling Catheter - Urethral: 248 mL  Total OUT: 1268 mL    Total NET: 1939.4 mL          LABS:                        7.8    34.46 )-----------( 233      ( 26 Apr 2018 05:03 )             24.6     04-26    139  |  103  |  50<H>  ----------------------------<  98  5.3<H>   |  26  |  1.4    Ca    7.9<L>      26 Apr 2018 05:03            CAPILLARY BLOOD GLUCOSE            CULTURES:      PHYSICAL EXAM:    General: No acute distress.  Alert, and follows commands off sedation     HEENT: Pupils equal, reactive to light symmetrically.    PULM: Clear to auscultation bilaterally, no significant sputum production.    CVS: Regular rate and rhythm, no murmurs, rubs, or gallops.    ABD: Hard, Distended, nontender, no masses.    EXT: Anasarca with palpable subcutaneous emphysema on chest     SKIN: Warm and well perfused, no rashes noted.    RADIOLOGY:     ASSESSMENT AND PLAN:    CNS: fentanyl    HEENT: Oral care    PULMONARY:  HOB @ 45 degrees. Bilateral chest tubes in place to suction      CARDIOVASCULAR: I>O , c/w LR bolus prn, On Midodrine 10 mg Q 8hrs, Wean off levophed    GI: GI ppx, OG tube feeds    RENAL:  monitor lytes and replete prn, low urine output     INFECTIOUS DISEASE: Completed antibiotic course (last day abx 4/23); Now with elevated white count, could be in setting of recurrent effusions      HEMATOLOGICAL:  SLIM dvt ppx    ENDOCRINE:  Follow up FS.  Insulin protocol if needed.    Prognosis poor

## 2018-04-26 NOTE — PROGRESS NOTE ADULT - SUBJECTIVE AND OBJECTIVE BOX
Patient is a 68y old  Female who presents with a chief complaint of sob, temperature, dec. BP (16 Apr 2018 05:53)       Pt is seen and examined  pt is awake and lying in bed      ROS:  unable to obtain    MEDICATIONS  (STANDING):  chlorhexidine 0.12% Liquid 15 milliLiter(s) Swish and Spit two times a day  fentaNYL   Infusion 0.1 MICROgram(s)/kG/Hr (0.616 mL/Hr) IV Continuous <Continuous>  gabapentin 100 milliGRAM(s) Oral three times a day  heparin  Injectable 5000 Unit(s) SubCutaneous every 8 hours  midodrine 10 milliGRAM(s) Oral three times a day  norepinephrine Infusion 0.02 MICROgram(s)/kG/Min (2.31 mL/Hr) IV Continuous <Continuous>  pantoprazole   Suspension 40 milliGRAM(s) Oral before breakfast  polyethylene glycol 3350 17 Gram(s) Oral daily  sodium chloride 0.9% Bolus 500 milliLiter(s) IV Bolus once  sodium chloride 0.9% Bolus 500 milliLiter(s) IV Bolus once    MEDICATIONS  (PRN):  acetaminophen  Suppository 650 milliGRAM(s) Rectal every 6 hours PRN For Temp greater than 38 C (100.4 F)  lactulose Syrup 15 Gram(s) Oral every 4 hours PRN constipation      Allergies    No Known Allergies    Intolerances        Vital Signs Last 24 Hrs  T(C): 36.8 (26 Apr 2018 08:00), Max: 37.6 (25 Apr 2018 20:00)  T(F): 98.3 (26 Apr 2018 08:00), Max: 99.6 (25 Apr 2018 20:00)  HR: 84 (26 Apr 2018 12:00) (76 - 108)  BP: 91/59 (26 Apr 2018 12:00) (79/53 - 127/75)  BP(mean): 67 (26 Apr 2018 12:00) (52 - 94)  RR: 23 (26 Apr 2018 12:00) (14 - 32)  SpO2: 100% (26 Apr 2018 12:00) (95% - 100%)    PHYSICAL EXAM  General: adult in NAD  HEENT: clear oropharynx, anicteric sclera, pink conjunctiva  Neck: supple  CV: normal S1/S2 with no murmur rubs or gallops  Lungs: positive air movement b/l ant lungs,b/l chest tube  Abdomen: soft non-tender ,distended,   Ext: no clubbing cyanosis   Skin: no rashes and no petechiae  Neuro:   LABS:                          7.8    34.46 )-----------( 233      ( 26 Apr 2018 05:03 )             24.6         Mean Cell Volume : 93.5 fL  Mean Cell Hemoglobin : 29.7 pg  Mean Cell Hemoglobin Concentration : 31.7 g/dL  Auto Neutrophil # : 31.41 K/uL  Auto Lymphocyte # : 0.60 K/uL  Auto Monocyte # : 1.75 K/uL  Auto Eosinophil # : 0.15 K/uL  Auto Basophil # : 0.06 K/uL  Auto Neutrophil % : 91.2 %  Auto Lymphocyte % : 1.7 %  Auto Monocyte % : 5.1 %  Auto Eosinophil % : 0.4 %  Auto Basophil % : 0.2 %    Serial CBC's  04-26 @ 05:03  Hct-24.6 / Hgb-7.8 / Plat-233 / RBC-2.63 / WBC-34.46          Serial CBC's  04-25 @ 04:20  Hct-24.6 / Hgb-8.1 / Plat-234 / RBC-2.70 / WBC-43.66          Serial CBC's  04-24 @ 05:01  Hct-26.7 / Hgb-8.7 / Plat-237 / RBC-2.93 / WBC-24.64          Serial CBC's  04-23 @ 04:50  Hct-21.9 / Hgb-6.8 / Plat-244 / RBC-2.37 / WBC-18.10            04-26    139  |  103  |  50<H>  ----------------------------<  98  5.3<H>   |  26  |  1.4    Ca    7.9<L>      26 Apr 2018 05:03            Platelet Count - Automated: 233 K/uL (04-26-18 @ 05:03)  Hematocrit: 24.6 % (04-26-18 @ 05:03)  Hemoglobin: 7.8 g/dL (04-26-18 @ 05:03)  WBC Count: 34.46 K/uL (04-26-18 @ 05:03)  Hematocrit: 24.6 % (04-25-18 @ 04:20)  Platelet Count - Automated: 234 K/uL (04-25-18 @ 04:20)  Hemoglobin: 8.1 g/dL (04-25-18 @ 04:20)  WBC Count: 43.66 K/uL (04-25-18 @ 04:20)  Platelet Count - Automated: 237 K/uL (04-24-18 @ 05:01)  Hemoglobin: 8.7 g/dL (04-24-18 @ 05:01)  WBC Count: 24.64 K/uL (04-24-18 @ 05:01)  Hematocrit: 26.7 % (04-24-18 @ 05:01)  Hemoglobin: 6.8 g/dL (04-23-18 @ 04:50)  WBC Count: 18.10 K/uL (04-23-18 @ 04:50)  Platelet Count - Automated: 244 K/uL (04-23-18 @ 04:50)  Hematocrit: 21.9 % (04-23-18 @ 04:50)  Hemoglobin: 7.1 g/dL (04-22-18 @ 04:56)  WBC Count: 9.83 K/uL (04-22-18 @ 04:56)  Platelet Count - Automated: 245 K/uL (04-22-18 @ 04:56)  Hematocrit: 21.3 % (04-22-18 @ 04:56)  WBC Count: 9.01 K/uL (04-21-18 @ 04:57)  Platelet Count - Automated: 254 K/uL (04-21-18 @ 04:57)  Hematocrit: 21.6 % (04-21-18 @ 04:57)  Hemoglobin: 7.1 g/dL (04-21-18 @ 04:57)  Platelet Count - Automated: 289 K/uL (04-20-18 @ 05:59)  Hemoglobin: 7.6 g/dL (04-20-18 @ 05:59)  WBC Count: 11.83 K/uL (04-20-18 @ 05:59)  Hematocrit: 22.9 % (04-20-18 @ 05:59)  WBC Count: 13.13 K/uL (04-20-18 @ 02:42)  Hematocrit: 23.3 % (04-20-18 @ 02:42)  Platelet Count - Automated: 303 K/uL (04-20-18 @ 02:42)  Hemoglobin: 7.6 g/dL (04-20-18 @ 02:42)  Hematocrit: 24.3 % (04-19-18 @ 05:27)  Hemoglobin: 8.0 g/dL (04-19-18 @ 05:27)  WBC Count: 18.38 K/uL (04-19-18 @ 05:27)  Platelet Count - Automated: 285 K/uL (04-19-18 @ 05:27)  Hemoglobin: 8.1 g/dL (04-18-18 @ 04:30)  WBC Count: 15.06 K/uL (04-18-18 @ 04:30)  Hematocrit: 24.5 % (04-18-18 @ 04:30)  Platelet Count - Automated: 297 K/uL (04-18-18 @ 04:30)  Platelet Count - Automated: 311 K/uL (04-17-18 @ 04:44)  Hematocrit: 25.2 % (04-17-18 @ 04:44)  Hemoglobin: 8.1 g/dL (04-17-18 @ 04:44)  WBC Count: 14.67 K/uL (04-17-18 @ 04:44)                BLOOD SMEAR INTERPRETATION:       RADIOLOGY & ADDITIONAL STUDIES:

## 2018-04-27 NOTE — PROGRESS NOTE ADULT - ASSESSMENT
I spoke to the patient through the ventilator unsedated.  There is no question in my mind that she wants to pull the endotracheal tube and she understands that she might die and does not want to be reintubated.  I spoke to him Alondraer Dylon Bloom at the bedside he indicates that he is the  but not really  to her although with her for over 20 years.  And I spoke by phone to the daughter.  Everyone is on board with extubating the patient on 2 noninvasive positive pressure ventilation and 10/6 with a BiPAP ST setting of about 8.  Everyone understands that if she seems to be suffering we will use a morphine drip.  This patient does have a terminal disease and apparently does not want to be mechanically ventilated in fact her daughter indicates that she probably never wanted to get this far.

## 2018-04-27 NOTE — PROGRESS NOTE ADULT - SUBJECTIVE AND OBJECTIVE BOX
Patient is a 68y old Female who presents with a chief complaint of sob, temperature, dec. BP (16 Apr 2018 05:53)    Currently admitted to medicine with the primary diagnosis of Pneumonia    Today is hospital day 11d.    BRIEF HOSPITAL COURSE:     EVENTS LAST 24HRS:     PAST MEDICAL & SURGICAL HISTORY  Depression, unspecified depression type  Anxiety  Malignant neoplasm of colon, unspecified part of colon  HTN (hypertension)  COPD (chronic obstructive pulmonary disease)      SOCIAL HISTORY:      REVIEW OF SYSTEMS:  See HPI    ALLERGIES:    MEDICATIONS:  STANDING MEDICATIONS  chlorhexidine 0.12% Liquid 15 milliLiter(s) Swish and Spit two times a day  fentaNYL   Infusion 0.1 MICROgram(s)/kG/Hr IV Continuous <Continuous>  gabapentin 100 milliGRAM(s) Oral three times a day  heparin  Injectable 5000 Unit(s) SubCutaneous every 8 hours  midodrine 10 milliGRAM(s) Oral three times a day  norepinephrine Infusion 0.02 MICROgram(s)/kG/Min IV Continuous <Continuous>  pantoprazole   Suspension 40 milliGRAM(s) Oral before breakfast  polyethylene glycol 3350 17 Gram(s) Oral daily  sodium chloride 0.9% Bolus 500 milliLiter(s) IV Bolus once  sodium chloride 0.9% Bolus 500 milliLiter(s) IV Bolus once    PRN MEDICATIONS  acetaminophen  Suppository 650 milliGRAM(s) Rectal every 6 hours PRN  lactulose Syrup 15 Gram(s) Oral every 4 hours PRN    VITALS:     Mode: AC/ CMV (Assist Control/ Continuous Mandatory Ventilation)  RR (machine): 16  TV (machine): 350  FiO2: 40  PEEP: 5  ITime: 1  MAP: 10  PIP: 17      ICU Vital Signs Last 24 Hrs:    T(C): 36.8 (27 Apr 2018 15:00), Max: 38 (26 Apr 2018 20:00)  T(F): 98.2 (27 Apr 2018 15:00), Max: 100.4 (26 Apr 2018 20:00)  HR: 105 (27 Apr 2018 16:19) (90 - 119)  BP: 97/54 (27 Apr 2018 16:00) (97/54 - 140/79)  BP(mean): 70 (27 Apr 2018 16:00) (70 - 112)  ABP: --  ABP(mean): --  RR: 27 (27 Apr 2018 16:00) (24 - 33)  SpO2: 100% (27 Apr 2018 16:19) (99% - 100%)      ABG - ( 27 Apr 2018 07:35 )  pH, Arterial: 7.42  pH, Blood: x     /  pCO2: 38    /  pO2: 74    / HCO3: 25    / Base Excess: 0.4   /  SaO2: 97                  I&O's Detail:      26 Apr 2018 07:01  -  27 Apr 2018 07:00  --------------------------------------------------------  IN:    Enteral Tube Flush: 60 mL    Osmolite: 945 mL    sodium chloride 0.9%: 400 mL  Total IN: 1405 mL    OUT:    Chest Tube: 200 mL    Indwelling Catheter - Urethral: 300 mL    Stool: 1 mL  Total OUT: 501 mL    Total NET: 904 mL      27 Apr 2018 07:01  -  27 Apr 2018 17:24  --------------------------------------------------------  IN:    Osmolite: 270 mL  Total IN: 270 mL    OUT:    Chest Tube: 4 mL    Chest Tube: 20 mL    Indwelling Catheter - Urethral: 310 mL    Rectal Tube: 50 mL  Total OUT: 384 mL    Total NET: -114 mL          LABS:                        7.9    28.78 )-----------( 233      ( 27 Apr 2018 04:46 )             24.5     04-27    140  |  104  |  51<H>  ----------------------------<  118<H>  4.8   |  26  |  1.2    Ca    8.0<L>      27 Apr 2018 04:46            CAPILLARY BLOOD GLUCOSE            CULTURES:      PHYSICAL EXAM:    General: No acute distress.  Alert, oriented, interactive, nonfocal.    HEENT: Pupils equal, reactive to light symmetrically.    PULM: Clear to auscultation bilaterally, no significant sputum production.    CVS: Regular rate and rhythm, no murmurs, rubs, or gallops.    ABD: Soft, nondistended, nontender, no masses.    EXT: No edema, nontender.    SKIN: Warm and well perfused, no rashes noted.    RADIOLOGY: Patient is a 68y old Female who presents with a chief complaint of sob, temperature, dec. BP (16 Apr 2018 05:53)    Currently admitted to medicine with the primary diagnosis of Pneumonia    Today is hospital day 11d.    BRIEF HOSPITAL COURSE:     68 [facility] F [facility] temporary resident for rehab with PMH of anxiety/depression, h/o colon Ca s/p R hemicolectomy not on current treatment, COPD not on home O2 and HTN was brought to the ED to r/o PNA. Developed acute hypoxic respiratory failure requiring intubation. Patient found to have pleural effusion R>L s/p thoracocentesis with removal of 1L of fluid. Was extubated but subsequently re-intubated within 24 hours. After re-intubation, she was hypotensive requiring pressors. During central line placement in left IJ, patient developed iatrogenic pneumothorax of left lung with subsequent chest tube insertion.     EVENTS LAST 24HRS:     Patient extubated. Family at bedside. Detailed note in chart. Will continue to monitor patient     PAST MEDICAL & SURGICAL HISTORY  Depression, unspecified depression type  Anxiety  Malignant neoplasm of colon, unspecified part of colon  HTN (hypertension)  COPD (chronic obstructive pulmonary disease)      SOCIAL HISTORY:      REVIEW OF SYSTEMS:  See HPI    ALLERGIES:    MEDICATIONS:  STANDING MEDICATIONS  chlorhexidine 0.12% Liquid 15 milliLiter(s) Swish and Spit two times a day  fentaNYL   Infusion 0.1 MICROgram(s)/kG/Hr IV Continuous <Continuous>  gabapentin 100 milliGRAM(s) Oral three times a day  heparin  Injectable 5000 Unit(s) SubCutaneous every 8 hours  midodrine 10 milliGRAM(s) Oral three times a day  norepinephrine Infusion 0.02 MICROgram(s)/kG/Min IV Continuous <Continuous>  pantoprazole   Suspension 40 milliGRAM(s) Oral before breakfast  polyethylene glycol 3350 17 Gram(s) Oral daily  sodium chloride 0.9% Bolus 500 milliLiter(s) IV Bolus once  sodium chloride 0.9% Bolus 500 milliLiter(s) IV Bolus once    PRN MEDICATIONS  acetaminophen  Suppository 650 milliGRAM(s) Rectal every 6 hours PRN  lactulose Syrup 15 Gram(s) Oral every 4 hours PRN    VITALS:     Mode: AC/ CMV (Assist Control/ Continuous Mandatory Ventilation)  RR (machine): 16  TV (machine): 350  FiO2: 40  PEEP: 5  ITime: 1  MAP: 10  PIP: 17      ICU Vital Signs Last 24 Hrs:    T(C): 36.8 (27 Apr 2018 15:00), Max: 38 (26 Apr 2018 20:00)  T(F): 98.2 (27 Apr 2018 15:00), Max: 100.4 (26 Apr 2018 20:00)  HR: 105 (27 Apr 2018 16:19) (90 - 119)  BP: 97/54 (27 Apr 2018 16:00) (97/54 - 140/79)  BP(mean): 70 (27 Apr 2018 16:00) (70 - 112)  ABP: --  ABP(mean): --  RR: 27 (27 Apr 2018 16:00) (24 - 33)  SpO2: 100% (27 Apr 2018 16:19) (99% - 100%)      ABG - ( 27 Apr 2018 07:35 )  pH, Arterial: 7.42  pH, Blood: x     /  pCO2: 38    /  pO2: 74    / HCO3: 25    / Base Excess: 0.4   /  SaO2: 97                  I&O's Detail:      26 Apr 2018 07:01  -  27 Apr 2018 07:00  --------------------------------------------------------  IN:    Enteral Tube Flush: 60 mL    Osmolite: 945 mL    sodium chloride 0.9%: 400 mL  Total IN: 1405 mL    OUT:    Chest Tube: 200 mL    Indwelling Catheter - Urethral: 300 mL    Stool: 1 mL  Total OUT: 501 mL    Total NET: 904 mL      27 Apr 2018 07:01  -  27 Apr 2018 17:24  --------------------------------------------------------  IN:    Osmolite: 270 mL  Total IN: 270 mL    OUT:    Chest Tube: 4 mL    Chest Tube: 20 mL    Indwelling Catheter - Urethral: 310 mL    Rectal Tube: 50 mL  Total OUT: 384 mL    Total NET: -114 mL          LABS:                        7.9    28.78 )-----------( 233      ( 27 Apr 2018 04:46 )             24.5     04-27    140  |  104  |  51<H>  ----------------------------<  118<H>  4.8   |  26  |  1.2    Ca    8.0<L>      27 Apr 2018 04:46            CAPILLARY BLOOD GLUCOSE            CULTURES:      PHYSICAL EXAM:    General: No acute distress.  Alert, and follows commands off sedation     HEENT: Pupils equal, reactive to light symmetrically.    PULM: Clear to auscultation bilaterally, no significant sputum production.    CVS: Regular rate and rhythm, no murmurs, rubs, or gallops.    ABD: Hard, Distended, nontender, no masses.    EXT: Anasarca with palpable subcutaneous emphysema on chest     SKIN: Warm and well perfused, no rashes noted.    RADIOLOGY:     ASSESSMENT AND PLAN:    CNS: No depressants    HEENT: Oral care    PULMONARY:  HOB @ 45 degrees. Bilateral chest tubes in place to suction; Goal to wean to nasal cannula     CARDIOVASCULAR: I>O , c/w LR bolus prn, On Midodrine 10 mg Q 8hrs, Off of levophed     GI: GI ppx, OG tube feeds    RENAL:  monitor lytes and replete prn, low urine output     INFECTIOUS DISEASE: Completed antibiotic course (last day abx 4/23); WBC count trending down, likely reactive     HEMATOLOGICAL:  SLIM dvt ppx    ENDOCRINE:  Follow up FS.  Insulin protocol if needed.    Prognosis poor

## 2018-04-27 NOTE — GOALS OF CARE CONVERSATION - PERSONAL ADVANCE DIRECTIVE - CONVERSATION DETAILS
SW met briefly with pt's daughter and sister, Palliative Team introduced.  Pt was re-intubated this morning, s/p chest tube for pneumo-thorax.  Family wants to continue with aggressive management at this time.  Palliative will remain available to discuss Goals of Care.  Pt is a Full Code at this time.
Palliative Medicine met with pt's daughter and niece.  Overall prognosis and current medical plan reviewed with daughter and pt's sister Daughter guarded.  Family wants to continue current level of treatment.  4/27/18  Pt now with two chest tubes, awake and now stating she wants to stop treatment (ie intubation).  Team  awaiting pt's daughter to discuss plan further.  Palliative will remain available.

## 2018-04-27 NOTE — GOALS OF CARE CONVERSATION - PERSONAL ADVANCE DIRECTIVE - TREATMENT GUIDELINE COMMENT
Full aggressive treatment at this time.
continue with current level of treatment.  Pending 2nd family meeting, pt now awake and stating she wants to be extubated.  Full Code currently.

## 2018-04-27 NOTE — PROGRESS NOTE ADULT - ASSESSMENT
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  lost to f/u after that.  admitted with cough ,completed  abx for PNA.  Left iartogenic pneumothorax,intubated for acute resp failure,s/p chest tube.s/p extubation as per pt's wish, pt and family were made aware of consequences by ICU team.  Bilateral pleural effusions.  R > L.  RO parapneumonic effusions VS malignant effusion   repeat CT chest showed rt pl effusion,s/p chest tube,f/u fluid cytology  Anemia likely multifactorial  transfuse to keep hb >7  check ferritin/iron profile/retic count  leukocytosis,trending down ,likely reactive  on abx,f/u with ID  Colon cancer with peritoneal mets ,Bx proven ,5 months back,did not get any Rx as she refused. lost to f/u , .now CT scan showing liver and pancreatic mass with carcinometosis,ascites,likley due to advance metastatic dz  further Mn for colon cancer when clinically more stable. will need pet/ct if she agrees and overall clinical condition is stable for further Rx.  overall prognosis is very poor.  explained to her partner at bedside Mr Marks on 24th,,expalined again yesterday to her daughter at bedside.  family is aware of her metastatic cancer and poor prognosis  informed pulm fellow   cont other supportive care.  will f/u

## 2018-04-27 NOTE — CHART NOTE - NSCHARTNOTEFT_GEN_A_CORE
Registered Dietitian Follow-Up     Patient Profile Reviewed                           Yes [x]   No []     Nutrition History Previously Obtained        Yes []  No [x]  pt s/p extubation, however pt was intubated during time of RD visit. See medical intervention below for more details.     Pertinent Medical Interventions: Pt was able to communicate wishes to remove ET tube. Daughter agrees with removal of ET tube, however upon further discussion of comfort care/DNI status she refused to agree to comfort measures in the event that patient progresses to respiratory failure. Pt now noted to be extubated and put on bipap; per RN, if pt does well will progress to fm then nc, if not dont re intubate pt and make pt comfortable with morphine.    Diet order: Orogastric tube feed order remains active, however pt currently extubated and not receiving nutrition support at this time.     Anthropometrics:  - Ht.: 154.94 cm  - Wt.: 73.7 kg (4/27, actual)  - wt change: lowest wt (4/16) 61.6 kg vs highest wt (4/26) 74.4 kg. Current dosing wt is 73.7 kg. Will use this wt to reassess nutrient needs.  - BMI: 30.7 (using 73.7 kg)  - IBW: 47.7 kg     Pertinent Lab Data: (4/27) RBC 2.67, H/H 7.9/24.5, BUN-51, gluc-118     Pertinent Meds: heparin, sodium chloride 0.9%, lactulose, miralax, levophed, protonix     Physical Findings:  - Appearance: s/p extubation; somnolent & difficulty speaking per flowsheets, edema (2+ to B/L hand)  - GI function: Abd distended. Fecal incontinence noted. Liquid stool noted 4/27.  - Tubes: NG tube  - Oral/Mouth cavity: intubated  - Skin: L buttock skin tear     Nutrition Requirements  Weight Used: 73.7 kg (current dosing wt)     Estimated Energy Needs    Continue []  Adjust [x]  Adjusted Energy Recommendations:  2228-2556 kcal/day (MSJx1.1-1.2 AF)        Estimated Protein Needs    Continue []  Adjust [x]  Adjusted Protein Recommendations: 57-67 g/day (1.2-1.4 g/kg IBW)        Estimated Fluid Needs        Continue [x]  Adjust []  Fluid Recommendations:   Per ICU team     Nutrient Intake: None at this time     [x] Previous Nutrition Diagnosis: Inadequate protein-energy intake - ongoing; pt extubated; no nutrition support at this time    Nutrition Intervention: Enteral nutrition vs. Meals & Snacks, Coordination of Care     Goal/Expected Outcome: Nutrition plan of care determined & initiated as medically feasible; ideally, pt to meet >85% but not exceed 105% of estimated nutrient needs within next 4 days if comfort care not initiated.     Indicator/Monitoring: energy intake, diet order, glucose profile, electrolyte profile, body composition, nutrition focused physical findings.    Recommendations: (1) If improvement in respiratory status occurs & plan to initiate po feeds, consult SLP services. (2) If pt to remain NPO with ongoing medical management, and if plan to initiate nutrition support, order Jevity 1.2 bolus feeds. Provide 120 mL q8hrs on day 1 to assess tolerance, monitoring Mg/PO4/K throughout. If tolerated, can provide 240 mL q8hrs on day 2. If continues to tolerate, provide goal rate on day 3: Jevity 1.2 at 240 mL q4hrs (skip 2 am feed, provide total 5 feeds/day). Regimen at goal to provide 1440 kcal, 66 g protein, 972 mL free H2O. Flushes per LIP. (3) RD to monitor plan of care & make adjustments to nutrition recommendations as appropriate.

## 2018-04-27 NOTE — PROGRESS NOTE ADULT - ASSESSMENT
68yFemale being evaluated for goals of care.    ICU team updated patient and family regarding patient's condition today. Patient able to communicate her wishes to remove ET tube. Patient made aware by primary team the possible progression to respiratory failure and death following vent withdrawal. Palliative Care services again explained to daughter- Daughter agrees with removal of ET tube, however upon further discussion of comfort care/DNI status she refused to agree to comfort measures in the event that patient progresses to respiratory failure. I explained in detail the process of comfort care including the use of morphine AS NEEDED in the event that patient desaturates with resp distress. Daughter became dismissive at that point and refused further conversation.       Dr. Conklin, ICU management and housestaff aware of discussion. Plan for ICU team to follow up to discuss possible extubation to ?BIPAP/NIPPV.    Should care plan continue to be conflicting, recommend multidisciplinary family meeting.     Call us PRN [phone]

## 2018-04-27 NOTE — PROGRESS NOTE ADULT - SUBJECTIVE AND OBJECTIVE BOX
Patient is a 68y old  Female who presents with a chief complaint of sob, temperature, dec. BP (16 Apr 2018 05:53)       Pt is seen and examined  pt is awake and lying in bed        ROS:  Negative except for: sob    MEDICATIONS  (STANDING):  chlorhexidine 0.12% Liquid 15 milliLiter(s) Swish and Spit two times a day  fentaNYL   Infusion 0.1 MICROgram(s)/kG/Hr (0.616 mL/Hr) IV Continuous <Continuous>  gabapentin 100 milliGRAM(s) Oral three times a day  heparin  Injectable 5000 Unit(s) SubCutaneous every 8 hours  midodrine 10 milliGRAM(s) Oral three times a day  norepinephrine Infusion 0.02 MICROgram(s)/kG/Min (2.31 mL/Hr) IV Continuous <Continuous>  pantoprazole   Suspension 40 milliGRAM(s) Oral before breakfast  polyethylene glycol 3350 17 Gram(s) Oral daily  sodium chloride 0.9% Bolus 500 milliLiter(s) IV Bolus once  sodium chloride 0.9% Bolus 500 milliLiter(s) IV Bolus once    MEDICATIONS  (PRN):  acetaminophen  Suppository 650 milliGRAM(s) Rectal every 6 hours PRN For Temp greater than 38 C (100.4 F)  lactulose Syrup 15 Gram(s) Oral every 4 hours PRN constipation      Allergies    No Known Allergies    Intolerances        Vital Signs Last 24 Hrs  T(C): 36.8 (27 Apr 2018 15:00), Max: 38 (26 Apr 2018 20:00)  T(F): 98.2 (27 Apr 2018 15:00), Max: 100.4 (26 Apr 2018 20:00)  HR: 105 (27 Apr 2018 16:19) (90 - 119)  BP: 97/54 (27 Apr 2018 16:00) (97/54 - 140/79)  BP(mean): 70 (27 Apr 2018 16:00) (70 - 112)  RR: 27 (27 Apr 2018 16:00) (24 - 33)  SpO2: 100% (27 Apr 2018 16:19) (99% - 100%)    PHYSICAL EXAM  General: adult in NAD  HEENT: s/p extubation.  Neck: supple  CV: normal S1/S2 with no murmur rubs or gallops  Lungs: positive air movement b/l ant lungs,+ rales  Abdomen: soft non-tender ,distended,  Ext: no clubbing cyanosis   Skin: no rashes and no petechiae  Neuro: alert and oriented X 4, no focal deficits  LABS:                          7.9    28.78 )-----------( 233      ( 27 Apr 2018 04:46 )             24.5         Mean Cell Volume : 91.8 fL  Mean Cell Hemoglobin : 29.6 pg  Mean Cell Hemoglobin Concentration : 32.2 g/dL  Auto Neutrophil # : 25.95 K/uL  Auto Lymphocyte # : 0.59 K/uL  Auto Monocyte # : 1.71 K/uL  Auto Eosinophil # : 0.13 K/uL  Auto Basophil # : 0.04 K/uL  Auto Neutrophil % : 90.1 %  Auto Lymphocyte % : 2.1 %  Auto Monocyte % : 5.9 %  Auto Eosinophil % : 0.5 %  Auto Basophil % : 0.1 %    Serial CBC's  04-27 @ 04:46  Hct-24.5 / Hgb-7.9 / Plat-233 / RBC-2.67 / WBC-28.78          Serial CBC's  04-26 @ 05:03  Hct-24.6 / Hgb-7.8 / Plat-233 / RBC-2.63 / WBC-34.46          Serial CBC's  04-25 @ 04:20  Hct-24.6 / Hgb-8.1 / Plat-234 / RBC-2.70 / WBC-43.66          Serial CBC's  04-24 @ 05:01  Hct-26.7 / Hgb-8.7 / Plat-237 / RBC-2.93 / WBC-24.64            04-27    140  |  104  |  51<H>  ----------------------------<  118<H>  4.8   |  26  |  1.2    Ca    8.0<L>      27 Apr 2018 04:46            Platelet Count - Automated: 233 K/uL (04-27-18 @ 04:46)  Hemoglobin: 7.9 g/dL (04-27-18 @ 04:46)  WBC Count: 28.78 K/uL (04-27-18 @ 04:46)  Hematocrit: 24.5 % (04-27-18 @ 04:46)  Platelet Count - Automated: 233 K/uL (04-26-18 @ 05:03)  Hematocrit: 24.6 % (04-26-18 @ 05:03)  Hemoglobin: 7.8 g/dL (04-26-18 @ 05:03)  WBC Count: 34.46 K/uL (04-26-18 @ 05:03)  Hematocrit: 24.6 % (04-25-18 @ 04:20)  Platelet Count - Automated: 234 K/uL (04-25-18 @ 04:20)  Hemoglobin: 8.1 g/dL (04-25-18 @ 04:20)  WBC Count: 43.66 K/uL (04-25-18 @ 04:20)  Platelet Count - Automated: 237 K/uL (04-24-18 @ 05:01)  Hemoglobin: 8.7 g/dL (04-24-18 @ 05:01)  WBC Count: 24.64 K/uL (04-24-18 @ 05:01)  Hematocrit: 26.7 % (04-24-18 @ 05:01)  Hemoglobin: 6.8 g/dL (04-23-18 @ 04:50)  WBC Count: 18.10 K/uL (04-23-18 @ 04:50)  Platelet Count - Automated: 244 K/uL (04-23-18 @ 04:50)  Hematocrit: 21.9 % (04-23-18 @ 04:50)  Hemoglobin: 7.1 g/dL (04-22-18 @ 04:56)  WBC Count: 9.83 K/uL (04-22-18 @ 04:56)  Platelet Count - Automated: 245 K/uL (04-22-18 @ 04:56)  Hematocrit: 21.3 % (04-22-18 @ 04:56)  WBC Count: 9.01 K/uL (04-21-18 @ 04:57)  Platelet Count - Automated: 254 K/uL (04-21-18 @ 04:57)  Hematocrit: 21.6 % (04-21-18 @ 04:57)  Hemoglobin: 7.1 g/dL (04-21-18 @ 04:57)  Platelet Count - Automated: 289 K/uL (04-20-18 @ 05:59)  Hemoglobin: 7.6 g/dL (04-20-18 @ 05:59)  WBC Count: 11.83 K/uL (04-20-18 @ 05:59)  Hematocrit: 22.9 % (04-20-18 @ 05:59)  WBC Count: 13.13 K/uL (04-20-18 @ 02:42)  Hematocrit: 23.3 % (04-20-18 @ 02:42)  Platelet Count - Automated: 303 K/uL (04-20-18 @ 02:42)  Hemoglobin: 7.6 g/dL (04-20-18 @ 02:42)  Hematocrit: 24.3 % (04-19-18 @ 05:27)  Hemoglobin: 8.0 g/dL (04-19-18 @ 05:27)  WBC Count: 18.38 K/uL (04-19-18 @ 05:27)  Platelet Count - Automated: 285 K/uL (04-19-18 @ 05:27)  Hemoglobin: 8.1 g/dL (04-18-18 @ 04:30)  WBC Count: 15.06 K/uL (04-18-18 @ 04:30)  Hematocrit: 24.5 % (04-18-18 @ 04:30)  Platelet Count - Automated: 297 K/uL (04-18-18 @ 04:30)                BLOOD SMEAR INTERPRETATION:       RADIOLOGY & ADDITIONAL STUDIES:

## 2018-04-27 NOTE — PROGRESS NOTE ADULT - SUBJECTIVE AND OBJECTIVE BOX
Over Night Events:  Patient seen and examined.       ROS:  See HPI    PHYSICAL EXAM    ICU Vital Signs Last 24 Hrs  T(C): 36.9 (27 Apr 2018 12:00), Max: 38 (26 Apr 2018 20:00)  T(F): 98.4 (27 Apr 2018 12:00), Max: 100.4 (26 Apr 2018 20:00)  HR: 119 (27 Apr 2018 14:00) (86 - 119)  BP: 132/78 (27 Apr 2018 14:00) (100/64 - 140/79)  BP(mean): 94 (27 Apr 2018 14:00) (74 - 112)  ABP: --  ABP(mean): --  RR: 33 (27 Apr 2018 14:00) (18 - 46)  SpO2: 99% (27 Apr 2018 14:00) (99% - 100%)        This patient was examined at about 0900 hrs.  At that time she was on a mechanical ventilator.  She was intubated through the orotracheal route.  There was a #8 endotracheal tube in place with an oral Flexiflo.  Pulse waabout 90.  The blood pressure was about 110/70.  The patient's abdomen was distended somewhat doughy.  There was no clubbing cyanosis or edema.  The extremities all move.          I&O's Detail    26 Apr 2018 07:01  -  27 Apr 2018 07:00  --------------------------------------------------------  IN:    Enteral Tube Flush: 60 mL    Osmolite: 945 mL    sodium chloride 0.9%: 400 mL  Total IN: 1405 mL    OUT:    Chest Tube: 200 mL    Indwelling Catheter - Urethral: 300 mL    Stool: 1 mL  Total OUT: 501 mL    Total NET: 904 mL      27 Apr 2018 07:01  -  27 Apr 2018 14:52  --------------------------------------------------------  IN:    Osmolite: 270 mL  Total IN: 270 mL    OUT:    Chest Tube: 4 mL    Chest Tube: 20 mL    Indwelling Catheter - Urethral: 280 mL    Rectal Tube: 50 mL  Total OUT: 354 mL    Total NET: -84 mL          LABS:                          7.9    28.78 )-----------( 233      ( 27 Apr 2018 04:46 )             24.5         27 Apr 2018 04:46    140    |  104    |  51     ----------------------------<  118    4.8     |  26     |  1.2      Ca    8.0        27 Apr 2018 04:46                                                                                                                                                  Culture - Body Fluid with Gram Stain (collected 26 Apr 2018 22:54)  Source: .Body Fluid Pleural Fluid  Gram Stain (27 Apr 2018 14:27):    No polymorphonuclear cells seen per low power field    No organisms seen per oil power field    by cytocentrifuge                                                   Mode: AC/ CMV (Assist Control/ Continuous Mandatory Ventilation)  RR (machine): 16  TV (machine): 350  FiO2: 40  PEEP: 5  ITime: 1  MAP: 10  PIP: 17                                      ABG - ( 27 Apr 2018 07:35 )  pH, Arterial: 7.42  pH, Blood: x     /  pCO2: 38    /  pO2: 74    / HCO3: 25    / Base Excess: 0.4   /  SaO2: 97                  MEDICATIONS  (STANDING):  chlorhexidine 0.12% Liquid 15 milliLiter(s) Swish and Spit two times a day  fentaNYL   Infusion 0.1 MICROgram(s)/kG/Hr (0.616 mL/Hr) IV Continuous <Continuous>  gabapentin 100 milliGRAM(s) Oral three times a day  heparin  Injectable 5000 Unit(s) SubCutaneous every 8 hours  midodrine 10 milliGRAM(s) Oral three times a day  norepinephrine Infusion 0.02 MICROgram(s)/kG/Min (2.31 mL/Hr) IV Continuous <Continuous>  pantoprazole   Suspension 40 milliGRAM(s) Oral before breakfast  polyethylene glycol 3350 17 Gram(s) Oral daily  sodium chloride 0.9% Bolus 500 milliLiter(s) IV Bolus once  sodium chloride 0.9% Bolus 500 milliLiter(s) IV Bolus once    MEDICATIONS  (PRN):  acetaminophen  Suppository 650 milliGRAM(s) Rectal every 6 hours PRN For Temp greater than 38 C (100.4 F)  lactulose Syrup 15 Gram(s) Oral every 4 hours PRN constipation          Xrays:  TLC:  OG:  ET tube:                                                                                       ECHO:    IMPRESSION:      PLAN:    CNS:    HEENT:    PULMONARY:    CARDIOVASCULAR:    GI: GI prophylaxis.  Feeding     RENAL:    INFECTIOUS DISEASE:    HEMATOLOGICAL:  DVT prophylaxis.    ENDOCRINE:  Follow up FS.  Insulin protocol if needed.    MUSCULOSKELETAL:

## 2018-04-27 NOTE — PROGRESS NOTE ADULT - SUBJECTIVE AND OBJECTIVE BOX
68yFemale with diagnosis: PNEUMONIA;SEVERE SEPSIS;PLEURAL EFFUSION      Patient seen, awake/alert, remains orally intubated, failed SBT today (tachypneic). Patient awake/alert, requesting extubation.  Daughter, patient's sister and patient's partner at bedside.     Vent: PEEP 5, FIO2 40%      PHYSICAL EXAM unchanged  B/L chest tubes in place.    T(C): , Max: 38 (20:00)  T(F): 98.4  HR: 108 (86 - 114)  BP: 132/78 (100/64 - 140/79)  RR: 33 (18 - 46)  SpO2: 99% (99% - 100%)              LABS:                          7.9    28.78 )-----------( 233      ( 27 Apr 2018 04:46 )             24.5                                                                                      04-27    140  |  104  |  51<H>  ----------------------------<  118<H>  4.8   |  26  |  1.2    Ca    8.0<L>      27 Apr 2018 04:46                                                        MEDICATIONS  (STANDING):  chlorhexidine 0.12% Liquid 15 milliLiter(s) Swish and Spit two times a day  fentaNYL   Infusion 0.1 MICROgram(s)/kG/Hr (0.616 mL/Hr) IV Continuous <Continuous>  gabapentin 100 milliGRAM(s) Oral three times a day  heparin  Injectable 5000 Unit(s) SubCutaneous every 8 hours  midodrine 10 milliGRAM(s) Oral three times a day  norepinephrine Infusion 0.02 MICROgram(s)/kG/Min (2.31 mL/Hr) IV Continuous <Continuous>  pantoprazole   Suspension 40 milliGRAM(s) Oral before breakfast  polyethylene glycol 3350 17 Gram(s) Oral daily  sodium chloride 0.9% Bolus 500 milliLiter(s) IV Bolus once  sodium chloride 0.9% Bolus 500 milliLiter(s) IV Bolus once    MEDICATIONS  (PRN):  acetaminophen  Suppository 650 milliGRAM(s) Rectal every 6 hours PRN For Temp greater than 38 C (100.4 F)  lactulose Syrup 15 Gram(s) Oral every 4 hours PRN constipation

## 2018-04-27 NOTE — CHART NOTE - NSCHARTNOTEFT_GEN_A_CORE
Spoke to family at length at bedside. Daughter Mynor Davison [phone] and partner Haseeb Marks present. Also present is sister Lucrecia. Extensive conversation had regarding patient's prognosis at this time. Dr. Drummond and team also present. Tried to explain to patient's family that there is a high likelihood that patient would not be able to breathe on her own once extubated. However, patient is alert off of sedation and still expresses wishes to be extubated. Daughter spoke to Dr. Villa Conklin extensively over the phone and PER HER UNDERSTANDING acknowledges that she is aware that her mother, Miss Alexus Davison, may not be able to come off of BIPAP and be de-escalated to less invasive means of oxygen. However, we have agreed that the goal is to try and de-escalate her to oxygen. She is agreeable that in the event her mother is unable to come off of BIPAP that she will be transitioned to comfort measures to keep her comfortable until her ultimate passing. DNR/DNI has been established. She acknowledges understanding with nurse manager Cayetano Bob and PGY2 Vitor Alejandro) Man as witnesses. Spoke to family at length at bedside. Daughter Mynor Davison [phone] and partner Haseeb Marks present. Also present is sister Lucrecia. Extensive conversation had regarding patient's prognosis at this time. Dr. Drummond and team also present. Tried to explain to patient's family that there is a high likelihood that patient would not be able to breathe on her own once extubated. However, patient is alert off of sedation and still expresses wishes to be extubated. Daughter spoke to Dr. Villa Conklin extensively over the phone and PER HER UNDERSTANDING acknowledges that she is aware that her mother, Miss Alexus Davison, may not be able to come off of BIPAP and be de-escalated to less invasive means of oxygen (ie nasal cannula). However, we have agreed that the goal is to try and de-escalate her to oxygen. She is agreeable that in the event her mother is unable to come off of BIPAP that she will be transitioned to comfort measures to keep her comfortable until her ultimate passing. DNR/DNI has been established. She acknowledges understanding with nurse manager Cayetano Bob and PGY2 Vitor Alejandro) Man as witnesses.

## 2018-04-28 NOTE — CHART NOTE - NSCHARTNOTEFT_GEN_A_CORE
ICU DOWNGRADE NOTE:    68y Female transferred to floor from ICU    Patient is a 68y old Female who presents with a chief complaint of sob, temperature, dec. BP (16 Apr 2018 05:53)    The patient is currently admitted for the primary diagnosis of Pneumonia    The patient was admitted to the unit for 12 Days.    The patient (was) twice intubated for 3 and 10 days later and (was) on pressors for (days).    Indwelling vascular catheters:    Urinary Catheter:     Disposition:    Code Status:    ICU COURSE OF EVENTS:  -------------------------------------------------------------------------------------------        -------------------------------------------------------------------------------------------    Current workup in progress:    SIGN OUT AT 04-28-18 @ 10:22 GIVEN TO: ICU DOWNGRADE NOTE:    68y Female transferred to floor from ICU    Patient is a 68y old Female who presents with a chief complaint of sob, temperature, dec. BP (16 Apr 2018 05:53)    The patient is currently admitted for the primary diagnosis of Pneumonia    The patient was admitted to the unit for 12 Days.    The patient (was) twice intubated for 3 days and was reintubated on same day, stayed intubated for10 days later and (was) on pressors for a few days (days).    Indwelling vascular catheters: peripheral    Urinary Catheter: none    Disposition: medical floor    Code Status: DNR/DNI    ICU COURSE OF EVENTS:  -------------------------------------------------------------------------------------------  68 Y F [facility] temporary resident for rehab with PMH of anxiety/depression, h/o colon Ca s/p R hemicolectomy not on current treatment, COPD not on home O2 and HTN was brought to the ED to r/o PNA. Developed acute hypoxic respiratory failure requiring intubation. Patient found to have pleural effusion R>L s/p thoracocentesis with removal of 1L of fluid. Was extubated but subsequently re-intubated within 24 hours. After re-intubation, she was hypotensive requiring pressors. During central line placement in left IJ, patient developed iatrogenic pneumothorax of left lung with subsequent chest tube insertion.       EVENTS LAST 24HRS:     Patient extubated on her wishes yesterday  on nasal canula  not on pressors  2 chest tubes in  place  pt wants comfort care only      -------------------------------------------------------------------------------------------    Current work up in progress:   speech and swallow eval  NPO with NG feeds for now  follow pulm for Chest tubes removal  Palliative on board          SIGN OUT AT 04-28-18 @ 10:22 GIVEN TO:

## 2018-04-28 NOTE — PROVIDER CONTACT NOTE (OTHER) - ASSESSMENT
Cvukacvcaom=048.7F. Heart xwlz=888-047g. O2 saturation=76-85%.
Last 2 hours, urine output=10cc/hr.
p0x 82% on 2L NC. hr 120

## 2018-04-28 NOTE — PROGRESS NOTE ADULT - ASSESSMENT
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  lost to f/u after that.  admitted with cough ,completed  abx for PNA.  1- respiratory --Left iartogenic pneumothorax,intubated for acute resp failure,s/p chest tube.s/p extubation as per pt's wish  pt on and off BIPAP  Bilateral pleural effusions.  R > L.  RO parapneumonic effusions VS malignant effusion   repeat CT chest showed rt pl effusion,s/p chest tube,f/u fluid cytology    2-Anemia likely multifactorial  transfuse to keep hb >7  due to chronic and acute illness   leukocytosis,trending down ,likely reactive  on abx,f/u with ID    3-Colon cancer with peritoneal mets ,  Bx proven ,5 months back,did not get any Rx as she refused. lost to f/u , .now CT scan showing liver and pancreatic mass with carcinometosis,ascites,likley due to advance metastatic dz  further Mn for colon cancer when clinically more stable. will need pet/ct if she agrees and overall clinical condition is stable for further Rx.  overall prognosis is very poor.    explained to her partner at bedside Mr Marks on 24th,,expalined again yesterday to her daughter at bedside.  family is aware of her metastatic cancer and poor prognosis  informed pulm fellow   cont other supportive care.  will f/u

## 2018-04-28 NOTE — PROVIDER CONTACT NOTE (OTHER) - RECOMMENDATIONS
md notified. md assessed pt at bedside. rn placed pt on venti. pt unable to tolerate. pt placed on nonrebreather p0x 98%.

## 2018-04-28 NOTE — PROVIDER CONTACT NOTE (OTHER) - ACTION/TREATMENT ORDERED:
Per Dr. Leyva, patient to receive LR bolus.
Tylenol suppository given. Patient placed on 100% nonrebreather face mask; respiratory therapist notified, at bedside with MD. Dr. Leyva to order ABG and possible high-flow nasal cannula.
md ordered stat cxr and ekg. awaiting ekg and cxr

## 2018-04-28 NOTE — SWALLOW BEDSIDE ASSESSMENT ADULT - SLP PERTINENT HISTORY OF CURRENT PROBLEM
admitted for pna (severe sepsis) ARF 4/16, extubated 4/27. hx of COPD and malignant neoplasm of colon. rn reports post extubation was intermittently on bipap, but tolerating 2L nasal cannula. NPO with NGT in place

## 2018-04-28 NOTE — PROGRESS NOTE ADULT - ASSESSMENT
ASSESSMENT AND PLAN:    CNS: No depressants    HEENT: Oral care    PULMONARY:  HOB @ 45 degrees. Bilateral chest tubes in place to suction; cw  nasal cannula     CARDIOVASCULAR: I>O , c/w LR bolus prn, On Midodrine 10 mg Q 8hrs, Off of levophed     GI: GI ppx, OG tube feeds    RENAL:  monitor lytes and replete prn, low urine output     INFECTIOUS DISEASE: Completed antibiotic course (last day abx 4/23); WBC count trending down, likely reactive     HEMATOLOGICAL:  SLIM dvt ppx    ENDOCRINE:  Follow up FS.  Insulin protocol if needed.    Prognosis poor  DRR/DNI  down graded to floor

## 2018-04-28 NOTE — PROGRESS NOTE ADULT - SUBJECTIVE AND OBJECTIVE BOX
Over Night Events:  [place] extubated yestrday ..    pt DNR DNI   Patient is a 68y old Female     Currently admitted to medicine with the primary diagnosis of Pneumonia    Today is hospital day 12d.    BRIEF HOSPITAL COURSE:     68 Y F [facility] temporary resident for rehab with PMH of anxiety/depression, h/o colon Ca s/p R hemicolectomy not on current treatment, COPD not on home O2 and HTN was brought to the ED to r/o PNA. Developed acute hypoxic respiratory failure requiring intubation. Patient found to have pleural effusion R>L s/p thoracocentesis with removal of 1L of fluid. Was extubated but subsequently re-intubated within 24 hours. After re-intubation, she was hypotensive requiring pressors. During central line placement in left IJ, patient developed iatrogenic pneumothorax of left lung with subsequent chest tube insertion.       ROS:  See HPI    PHYSICAL EXAM    ICU Vital Signs Last 24 Hrs  T(C): 36.7 (28 Apr 2018 08:00), Max: 37.2 (28 Apr 2018 04:00)  T(F): 98.1 (28 Apr 2018 08:00), Max: 99 (28 Apr 2018 04:00)  HR: 100 (28 Apr 2018 11:00) (88 - 119)  BP: 138/80 (28 Apr 2018 11:00) (97/54 - 151/93)  BP(mean): 98 (28 Apr 2018 11:00) (70 - 125)  ABP: --  ABP(mean): --  RR: 27 (28 Apr 2018 11:00) (22 - 34)  SpO2: 99% (28 Apr 2018 11:00) (99% - 100%)      General:AO x3  HEENT:           Nl     Lymph Nodes: NO cervical LN   Lungs: Bilateral BS  Cardiovascular: Regular   Abdomen: Soft, Positive BS  Extremities: No clubbing   Skin: Nl  Neurological: Nl      LABS:                          8.1    27.07 )-----------( 267      ( 28 Apr 2018 05:02 )             25.1                                               04-28    141  |  103  |  53<H>  ----------------------------<  67<L>  4.7   |  26  |  1.1    Ca    8.6      28 Apr 2018 05:02                                                                                                                                      Culture - Body Fluid with Gram Stain (collected 26 Apr 2018 22:54)  Source: .Body Fluid Pleural Fluid  Gram Stain (27 Apr 2018 14:27):    No polymorphonuclear cells seen per low power field    No organisms seen per oil power field    by cytocentrifuge  Preliminary Report (28 Apr 2018 11:37):    No growth                                                                                       ABG - ( 27 Apr 2018 07:35 )  pH, Arterial: 7.42  pH, Blood: x     /  pCO2: 38    /  pO2: 74    / HCO3: 25    / Base Excess: 0.4   /  SaO2: 97                  MEDICATIONS  (STANDING):  gabapentin 100 milliGRAM(s) Oral three times a day  heparin  Injectable 5000 Unit(s) SubCutaneous every 8 hours  midodrine 10 milliGRAM(s) Oral three times a day  norepinephrine Infusion 0.02 MICROgram(s)/kG/Min (2.31 mL/Hr) IV Continuous <Continuous>  pantoprazole   Suspension 40 milliGRAM(s) Oral before breakfast  sodium chloride 0.9% Bolus 500 milliLiter(s) IV Bolus once  sodium chloride 0.9% Bolus 500 milliLiter(s) IV Bolus once    MEDICATIONS  (PRN):  acetaminophen  Suppository 650 milliGRAM(s) Rectal every 6 hours PRN For Temp greater than 38 C (100.4 F)      Xrays:                                                                                     ECHO    IMPRESSION:

## 2018-04-28 NOTE — PROGRESS NOTE ADULT - SUBJECTIVE AND OBJECTIVE BOX
Patient is a 68y old Female who presents with a chief complaint of sob, temperature, dec. BP (16 Apr 2018 05:53)    Currently admitted to medicine with the primary diagnosis of Pneumonia    Today is hospital day 12d.    BRIEF HOSPITAL COURSE:     68 Y F [facility] temporary resident for rehab with PMH of anxiety/depression, h/o colon Ca s/p R hemicolectomy not on current treatment, COPD not on home O2 and HTN was brought to the ED to r/o PNA. Developed acute hypoxic respiratory failure requiring intubation. Patient found to have pleural effusion R>L s/p thoracocentesis with removal of 1L of fluid. Was extubated but subsequently re-intubated within 24 hours. After re-intubation, she was hypotensive requiring pressors. During central line placement in left IJ, patient developed iatrogenic pneumothorax of left lung with subsequent chest tube insertion.     EVENTS LAST 24HRS:     Patient extubated on her wishes yesterday  on nasal canula  not on pressors  2 chest tubes at place    PAST MEDICAL & SURGICAL HISTORY  Depression, unspecified depression type  Anxiety  Malignant neoplasm of colon, unspecified part of colon  HTN (hypertension)  COPD (chronic obstructive pulmonary disease)      SOCIAL HISTORY:      REVIEW OF SYSTEMS:  See HPI    ALLERGIES:    MEDICATIONS:  STANDING MEDICATIONS  chlorhexidine 0.12% Liquid 15 milliLiter(s) Swish and Spit two times a day  fentaNYL   Infusion 0.1 MICROgram(s)/kG/Hr IV Continuous <Continuous>  gabapentin 100 milliGRAM(s) Oral three times a day  heparin  Injectable 5000 Unit(s) SubCutaneous every 8 hours  midodrine 10 milliGRAM(s) Oral three times a day  norepinephrine Infusion 0.02 MICROgram(s)/kG/Min IV Continuous <Continuous>  pantoprazole   Suspension 40 milliGRAM(s) Oral before breakfast  polyethylene glycol 3350 17 Gram(s) Oral daily  sodium chloride 0.9% Bolus 500 milliLiter(s) IV Bolus once  sodium chloride 0.9% Bolus 500 milliLiter(s) IV Bolus once    PRN MEDICATIONS  acetaminophen  Suppository 650 milliGRAM(s) Rectal every 6 hours PRN  lactulose Syrup 15 Gram(s) Oral every 4 hours PRN    VITALS:     I&O's Detail    27 Apr 2018 07:01  -  28 Apr 2018 07:00  --------------------------------------------------------  IN:    Osmolite: 270 mL  Total IN: 270 mL    OUT:    Chest Tube: 20 mL    Chest Tube: 20 mL    Indwelling Catheter - Urethral: 780 mL    Rectal Tube: 250 mL  Total OUT: 1070 mL  Vital Signs Last 24 Hrs  T(C): 37.2 (28 Apr 2018 04:00), Max: 37.2 (28 Apr 2018 04:00)  T(F): 99 (28 Apr 2018 04:00), Max: 99 (28 Apr 2018 04:00)  HR: 88 (28 Apr 2018 07:00) (88 - 119)  BP: 115/70 (28 Apr 2018 07:00) (97/54 - 151/93)  BP(mean): 87 (28 Apr 2018 07:00) (70 - 125)  RR: 23 (28 Apr 2018 07:00) (22 - 33)  SpO2: 100% (28 Apr 2018 07:00) (99% - 100%)  Total NET: -800 mL            LABS:                                   8.1    27.07 )-----------( 267      ( 28 Apr 2018 05:02 )             25.1   04-28    141  |  103  |  53<H>  ----------------------------<  67<L>  4.7   |  26  |  1.1    Ca    8.6      28 Apr 2018 05:02        CAPILLARY BLOOD GLUCOSE            CULTURES:      PHYSICAL EXAM:    General: No acute distress.  Alert, and follows commands off sedation     HEENT: Pupils equal, reactive to light symmetrically.    PULM: Clear to auscultation bilaterally, no significant sputum production. B/L chest tubes in place    CVS: Regular rate and rhythm, no murmurs, rubs, or gallops.    ABD: Hard, Distended, nontender, no masses.    EXT: Anasarca with palpable subcutaneous emphysema on chest     SKIN: Warm and well perfused, no rashes noted.    RADIOLOGY:     ASSESSMENT AND PLAN:    CNS: No depressants    HEENT: Oral care    PULMONARY:  HOB @ 45 degrees. Bilateral chest tubes in place to suction; cw  nasal cannula     CARDIOVASCULAR: I>O , c/w LR bolus prn, On Midodrine 10 mg Q 8hrs, Off of levophed     GI: GI ppx, OG tube feeds    RENAL:  monitor lytes and replete prn, low urine output     INFECTIOUS DISEASE: Completed antibiotic course (last day abx 4/23); WBC count trending down, likely reactive     HEMATOLOGICAL:  SLIM dvt ppx    ENDOCRINE:  Follow up FS.  Insulin protocol if needed.    Prognosis poor Patient is a 68y old Female who presents with a chief complaint of sob, temperature, dec. BP (16 Apr 2018 05:53)    Currently admitted to medicine with the primary diagnosis of Pneumonia    Today is hospital day 12d.    BRIEF HOSPITAL COURSE:     68 Y F [facility] temporary resident for rehab with PMH of anxiety/depression, h/o colon Ca s/p R hemicolectomy not on current treatment, COPD not on home O2 and HTN was brought to the ED to r/o PNA. Developed acute hypoxic respiratory failure requiring intubation. Patient found to have pleural effusion R>L s/p thoracocentesis with removal of 1L of fluid. Was extubated but subsequently re-intubated within 24 hours. After re-intubation, she was hypotensive requiring pressors. During central line placement in left IJ, patient developed iatrogenic pneumothorax of left lung with subsequent chest tube insertion.     EVENTS LAST 24HRS:     Patient extubated on her wishes yesterday  on nasal canula  not on pressors  2 chest tubes in  place    PAST MEDICAL & SURGICAL HISTORY  Depression, unspecified depression type  Anxiety  Malignant neoplasm of colon, unspecified part of colon  HTN (hypertension)  COPD (chronic obstructive pulmonary disease)      SOCIAL HISTORY:      REVIEW OF SYSTEMS:  See HPI    ALLERGIES:    MEDICATIONS:  STANDING MEDICATIONS  chlorhexidine 0.12% Liquid 15 milliLiter(s) Swish and Spit two times a day  fentaNYL   Infusion 0.1 MICROgram(s)/kG/Hr IV Continuous <Continuous>  gabapentin 100 milliGRAM(s) Oral three times a day  heparin  Injectable 5000 Unit(s) SubCutaneous every 8 hours  midodrine 10 milliGRAM(s) Oral three times a day  norepinephrine Infusion 0.02 MICROgram(s)/kG/Min IV Continuous <Continuous>  pantoprazole   Suspension 40 milliGRAM(s) Oral before breakfast  polyethylene glycol 3350 17 Gram(s) Oral daily  sodium chloride 0.9% Bolus 500 milliLiter(s) IV Bolus once  sodium chloride 0.9% Bolus 500 milliLiter(s) IV Bolus once    PRN MEDICATIONS  acetaminophen  Suppository 650 milliGRAM(s) Rectal every 6 hours PRN  lactulose Syrup 15 Gram(s) Oral every 4 hours PRN    VITALS:     I&O's Detail    27 Apr 2018 07:01  -  28 Apr 2018 07:00  --------------------------------------------------------  IN:    Osmolite: 270 mL  Total IN: 270 mL    OUT:    Chest Tube: 20 mL    Chest Tube: 20 mL    Indwelling Catheter - Urethral: 780 mL    Rectal Tube: 250 mL  Total OUT: 1070 mL  Vital Signs Last 24 Hrs  T(C): 37.2 (28 Apr 2018 04:00), Max: 37.2 (28 Apr 2018 04:00)  T(F): 99 (28 Apr 2018 04:00), Max: 99 (28 Apr 2018 04:00)  HR: 88 (28 Apr 2018 07:00) (88 - 119)  BP: 115/70 (28 Apr 2018 07:00) (97/54 - 151/93)  BP(mean): 87 (28 Apr 2018 07:00) (70 - 125)  RR: 23 (28 Apr 2018 07:00) (22 - 33)  SpO2: 100% (28 Apr 2018 07:00) (99% - 100%)  Total NET: -800 mL            LABS:                                   8.1    27.07 )-----------( 267      ( 28 Apr 2018 05:02 )             25.1   04-28    141  |  103  |  53<H>  ----------------------------<  67<L>  4.7   |  26  |  1.1    Ca    8.6      28 Apr 2018 05:02        CAPILLARY BLOOD GLUCOSE            CULTURES:      PHYSICAL EXAM:    General: No acute distress.  Alert, and follows commands off sedation     HEENT: Pupils equal, reactive to light symmetrically.    PULM: Clear to auscultation bilaterally, no significant sputum production. B/L chest tubes in place    CVS: Regular rate and rhythm, no murmurs, rubs, or gallops.    ABD: Hard, Distended, nontender, no masses.    EXT: Anasarca with palpable subcutaneous emphysema on chest     SKIN: Warm and well perfused, no rashes noted.    RADIOLOGY:     ASSESSMENT AND PLAN:    CNS: No depressants    HEENT: Oral care    PULMONARY:  HOB @ 45 degrees. Bilateral chest tubes in place to suction; cw  nasal cannula     CARDIOVASCULAR: I>O , c/w LR bolus prn, On Midodrine 10 mg Q 8hrs, Off of levophed     GI: GI ppx, OG tube feeds    RENAL:  monitor lytes and replete prn, low urine output     INFECTIOUS DISEASE: Completed antibiotic course (last day abx 4/23); WBC count trending down, likely reactive     HEMATOLOGICAL:  SLIM dvt ppx    ENDOCRINE:  Follow up FS.  Insulin protocol if needed.    Prognosis poor  DRR/DNI  down graded to floor

## 2018-04-28 NOTE — PROGRESS NOTE ADULT - SUBJECTIVE AND OBJECTIVE BOX
Patient is a 68y old  Female who presents with a chief complaint of sob, temperature, dec. BP (16 Apr 2018 05:53)       Pt is seen and examined  extubated  pt is awake and lying in bed        ROS:  Negative except for: sob    MEDICATIONS  (STANDING):  chlorhexidine 0.12% Liquid 15 milliLiter(s) Swish and Spit two times a day  fentaNYL   Infusion 0.1 MICROgram(s)/kG/Hr (0.616 mL/Hr) IV Continuous <Continuous>  gabapentin 100 milliGRAM(s) Oral three times a day  heparin  Injectable 5000 Unit(s) SubCutaneous every 8 hours  midodrine 10 milliGRAM(s) Oral three times a day  norepinephrine Infusion 0.02 MICROgram(s)/kG/Min (2.31 mL/Hr) IV Continuous <Continuous>  pantoprazole   Suspension 40 milliGRAM(s) Oral before breakfast  polyethylene glycol 3350 17 Gram(s) Oral daily  sodium chloride 0.9% Bolus 500 milliLiter(s) IV Bolus once  sodium chloride 0.9% Bolus 500 milliLiter(s) IV Bolus once    MEDICATIONS  (PRN):  acetaminophen  Suppository 650 milliGRAM(s) Rectal every 6 hours PRN For Temp greater than 38 C (100.4 F)  lactulose Syrup 15 Gram(s) Oral every 4 hours PRN constipation      Allergies    No Known Allergies    Intolerances        Vital Signs Last 24 Hrs  T(C): 36.8 (27 Apr 2018 15:00), Max: 38 (26 Apr 2018 20:00)  T(F): 98.2 (27 Apr 2018 15:00), Max: 100.4 (26 Apr 2018 20:00)  HR: 105 (27 Apr 2018 16:19) (90 - 119)  BP: 97/54 (27 Apr 2018 16:00) (97/54 - 140/79)  BP(mean): 70 (27 Apr 2018 16:00) (70 - 112)  RR: 27 (27 Apr 2018 16:00) (24 - 33)  SpO2: 100% (27 Apr 2018 16:19) (99% - 100%)    PHYSICAL EXAM  General: adult in NAD  HEENT: s/p extubation.  Neck: supple  CV: normal S1/S2 with no murmur rubs or gallops  Lungs: positive air movement b/l ant lungs,+ rales  Abdomen: soft non-tender ,distended,  Ext: no clubbing cyanosis   Skin: no rashes and no petechiae  Neuro: alert and oriented X 4, no focal deficits  LABS:                          7.9    28.78 )-----------( 233      ( 27 Apr 2018 04:46 )             24.5         Mean Cell Volume : 91.8 fL  Mean Cell Hemoglobin : 29.6 pg  Mean Cell Hemoglobin Concentration : 32.2 g/dL  Auto Neutrophil # : 25.95 K/uL  Auto Lymphocyte # : 0.59 K/uL  Auto Monocyte # : 1.71 K/uL  Auto Eosinophil # : 0.13 K/uL  Auto Basophil # : 0.04 K/uL  Auto Neutrophil % : 90.1 %  Auto Lymphocyte % : 2.1 %  Auto Monocyte % : 5.9 %  Auto Eosinophil % : 0.5 %  Auto Basophil % : 0.1 %    Serial CBC's  04-27 @ 04:46  Hct-24.5 / Hgb-7.9 / Plat-233 / RBC-2.67 / WBC-28.78          Serial CBC's  04-26 @ 05:03  Hct-24.6 / Hgb-7.8 / Plat-233 / RBC-2.63 / WBC-34.46          Serial CBC's  04-25 @ 04:20  Hct-24.6 / Hgb-8.1 / Plat-234 / RBC-2.70 / WBC-43.66          Serial CBC's  04-24 @ 05:01  Hct-26.7 / Hgb-8.7 / Plat-237 / RBC-2.93 / WBC-24.64            04-27    140  |  104  |  51<H>  ----------------------------<  118<H>  4.8   |  26  |  1.2    Ca    8.0<L>      27 Apr 2018 04:46            Platelet Count - Automated: 233 K/uL (04-27-18 @ 04:46)  Hemoglobin: 7.9 g/dL (04-27-18 @ 04:46)  WBC Count: 28.78 K/uL (04-27-18 @ 04:46)  Hematocrit: 24.5 % (04-27-18 @ 04:46)  Platelet Count - Automated: 233 K/uL (04-26-18 @ 05:03)  Hematocrit: 24.6 % (04-26-18 @ 05:03)  Hemoglobin: 7.8 g/dL (04-26-18 @ 05:03)  WBC Count: 34.46 K/uL (04-26-18 @ 05:03)  Hematocrit: 24.6 % (04-25-18 @ 04:20)  Platelet Count - Automated: 234 K/uL (04-25-18 @ 04:20)  Hemoglobin: 8.1 g/dL (04-25-18 @ 04:20)  WBC Count: 43.66 K/uL (04-25-18 @ 04:20)  Platelet Count - Automated: 237 K/uL (04-24-18 @ 05:01)  Hemoglobin: 8.7 g/dL (04-24-18 @ 05:01)  WBC Count: 24.64 K/uL (04-24-18 @ 05:01)  Hematocrit: 26.7 % (04-24-18 @ 05:01)  Hemoglobin: 6.8 g/dL (04-23-18 @ 04:50)  WBC Count: 18.10 K/uL (04-23-18 @ 04:50)  Platelet Count - Automated: 244 K/uL (04-23-18 @ 04:50)  Hematocrit: 21.9 % (04-23-18 @ 04:50)  Hemoglobin: 7.1 g/dL (04-22-18 @ 04:56)  WBC Count: 9.83 K/uL (04-22-18 @ 04:56)  Platelet Count - Automated: 245 K/uL (04-22-18 @ 04:56)  Hematocrit: 21.3 % (04-22-18 @ 04:56)  WBC Count: 9.01 K/uL (04-21-18 @ 04:57)  Platelet Count - Automated: 254 K/uL (04-21-18 @ 04:57)  Hematocrit: 21.6 % (04-21-18 @ 04:57)  Hemoglobin: 7.1 g/dL (04-21-18 @ 04:57)  Platelet Count - Automated: 289 K/uL (04-20-18 @ 05:59)  Hemoglobin: 7.6 g/dL (04-20-18 @ 05:59)  WBC Count: 11.83 K/uL (04-20-18 @ 05:59)  Hematocrit: 22.9 % (04-20-18 @ 05:59)  WBC Count: 13.13 K/uL (04-20-18 @ 02:42)  Hematocrit: 23.3 % (04-20-18 @ 02:42)  Platelet Count - Automated: 303 K/uL (04-20-18 @ 02:42)  Hemoglobin: 7.6 g/dL (04-20-18 @ 02:42)  Hematocrit: 24.3 % (04-19-18 @ 05:27)  Hemoglobin: 8.0 g/dL (04-19-18 @ 05:27)  WBC Count: 18.38 K/uL (04-19-18 @ 05:27)  Platelet Count - Automated: 285 K/uL (04-19-18 @ 05:27)  Hemoglobin: 8.1 g/dL (04-18-18 @ 04:30)  WBC Count: 15.06 K/uL (04-18-18 @ 04:30)  Hematocrit: 24.5 % (04-18-18 @ 04:30)  Platelet Count - Automated: 297 K/uL (04-18-18 @ 04:30)

## 2018-04-29 NOTE — CHART NOTE - NSCHARTNOTEFT_GEN_A_CORE
Spoke with Daughter Arnoldo regarding the patient's intubation the night previously:    As per the daughter, the physicians covering had asked the patient about the possibility of needing to intubate her if her hypoxia didn't correct and the patient had stated that she was agreeable to intubation at that time (previous documentation during her ICU stay states that the patient had chosen to be extubated, and wanted comfort measures). Currently the patient remains sedated on the ventilator. The daughter will speak with her step father (who are both the health care proxies) and decide what the goals of care are regarding the patient. Currently she remains full code, on abx for the fevers and worsening CXR.   Step father is to be coming in later in the evening today.

## 2018-04-29 NOTE — PROGRESS NOTE ADULT - ASSESSMENT
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  lost to f/u after that.  admitted with cough ,completed  abx for PNA.  1- respiratory --Left iartogenic pneumothorax,intubated for acute resp failure,s/p chest tube.s/p extubation as per pt's wish  pt on and off BIPAP,,extubated and reintubated... PTX     2 Bilateral pleural effusions.  R > L.  RO parapneumonic effusions VS malignant effusion   repeat CT chest showed rt pl effusion,s/p chest tube,f/u fluid cytology    3 -Anemia likely multifactorial  transfuse to keep hb >7  due to chronic and acute illness   leukocytosis,trending down ,likely reactive  on abx,f/u with ID    4-Colon cancer with peritoneal mets ,  Bx proven ,5 months back,did not get any Rx as she refused. lost to f/u , .now CT scan showing liver and pancreatic mass with carcinometosis,ascites,likley due to advance metastatic dz  further Mn for colon cancer when clinically more stable. will need pet/ct if she agrees and overall clinical condition is stable for further Rx.  overall prognosis is very poor.

## 2018-04-29 NOTE — PROGRESS NOTE ADULT - ATTENDING COMMENTS
determine status  abx coverage
Attending Statement: I have personally performed a face to face diagnostic evaluation on this patient. I have reviewed the above note and agree with the history, exam and plan of care, except as I have noted in the text.

## 2018-04-29 NOTE — PROGRESS NOTE ADULT - SUBJECTIVE AND OBJECTIVE BOX
Patient is a 68y old  Female who presents with a chief complaint of sob, temperature, dec. BP (16 Apr 2018 05:53)  PNEUMONIA;SEVERE SEPSIS;PLEURAL EFFUSION  PNEUMONIA  ^PNEUMONIA;SEVERE SEPSIS;PLEURAL EFFUSION  No h/o HF  Yes  No pertinent family history in first degree relatives  Handoff  MEWS Score  Depression, unspecified depression type  Anxiety  Malignant neoplasm of colon, unspecified part of colon  HTN (hypertension)  COPD (chronic obstructive pulmonary disease)  Pneumonia  Pneumothorax, iatrogenic  Pneumothorax on left  SOB  Pleural effusion  Severe sepsis    PNEUMONIA;SEVERE SEPSIS;PLEURAL EFFUSION  PNEUMONIA  ^PNEUMONIA;SEVERE SEPSIS;PLEURAL EFFUSION  No h/o HF  Yes  No pertinent family history in first degree relatives  Handoff  MEWS Score  Depression, unspecified depression type  Anxiety  Malignant neoplasm of colon, unspecified part of colon  HTN (hypertension)  COPD (chronic obstructive pulmonary disease)  Pneumonia  Pneumothorax, iatrogenic  Pneumothorax on left  SOB  Pleural effusion  Severe sepsis  PNEUMONIA;SEVERE SEPSIS;PLEURAL EFFUSION [Active]  PNEUMONIA [Inactive]  Yes [Inactive]  Depression, unspecified depression type [Active]  Anxiety [Active]  Malignant neoplasm of colon, unspecified part of colon [Active]  HTN (hypertension) [Active]  COPD (chronic obstructive pulmonary disease) [Active]  Pneumonia [Active]  Pneumothorax, iatrogenic [Active]  Pneumothorax on left [Active]  Pleural effusion [Active]  Severe sepsis [Active]          HOSPITAL DAY NO: 13d      Vital Signs Last 24 Hrs  T(C): 39.4 (29 Apr 2018 08:15), Max: 39.4 (29 Apr 2018 08:15)  T(F): 102.9 (29 Apr 2018 08:15), Max: 102.9 (29 Apr 2018 08:15)  HR: 129 (29 Apr 2018 08:15) (96 - 150)  BP: 96/54 (29 Apr 2018 08:15) (85/51 - 166/103)  BP(mean): 63 (29 Apr 2018 08:15) (57 - 126)  RR: 20 (29 Apr 2018 08:15) (20 - 35)  SpO2: 99% (29 Apr 2018 01:46) (92% - 100%)    use of pressors: Y ___  N _x___    use of sedation: Y ___  N ____    Vent dependent: Yx_  N ____    Mode: AC/ CMV (Assist Control/ Continuous Mandatory Ventilation)  RR (machine): 12  TV (machine): 400  FiO2: 60  PEEP: 5  ITime: 1  MAP: 18  PIP: 39           Mode: AC/ CMV (Assist Control/ Continuous Mandatory Ventilation)  RR (machine): 12  TV (machine): 400  FiO2: 100  PEEP: 5  ITime: 1  MAP: 18  PIP: 39                             ABG - ( 29 Apr 2018 08:19 )  pH, Arterial: 7.41  pH, Blood: x     /  pCO2: 36    /  pO2: 267   / HCO3: 23    / Base Excess: -1.2  /  SaO2: 101                 Weaning time: x    Significant exam findings:   MS:  CHEST: B/L breath sounds   ABDOMEN: soft   HEART:S1/S2 regular  SKIN: bilateral stage 2    No of BMs: 0  OUT: 7.84 mL/kg/d        Nutrition:                    04-28-18 @ 07:01  -  04-29-18 @ 07:00  --------------------------------------------------------  IN:    Osmolite: 240 mL  Total IN: 240 mL          LABS:                          8.1    27.07 )-----------( 267      ( 28 Apr 2018 05:02 )             25.1                                               04-28    141  |  103  |  53<H>  ----------------------------<  67<L>  4.7   |  26  |  1.1    Ca    8.6      28 Apr 2018 05:02                                                                                           Culture - Body Fluid with Gram Stain (collected 26 Apr 2018 22:54)  Source: .Body Fluid Pleural Fluid  Gram Stain (27 Apr 2018 14:27):    No polymorphonuclear cells seen per low power field    No organisms seen per oil power field    by cytocentrifuge  Preliminary Report (28 Apr 2018 11:37):    No growth                                                       MEDICATIONS  (STANDING):  fentaNYL   Infusion 1 MICROgram(s)/kG/Hr (7.37 mL/Hr) IV Continuous <Continuous>  gabapentin 100 milliGRAM(s) Oral three times a day  heparin  Injectable 5000 Unit(s) SubCutaneous every 8 hours  midodrine 10 milliGRAM(s) Oral three times a day  pantoprazole   Suspension 40 milliGRAM(s) Oral before breakfast  piperacillin/tazobactam IVPB. 4.5 Gram(s) IV Intermittent once  piperacillin/tazobactam IVPB. 4.5 Gram(s) IV Intermittent every 6 hours  sodium chloride 0.9% Bolus 500 milliLiter(s) IV Bolus once  sodium chloride 0.9% Bolus 500 milliLiter(s) IV Bolus once  vancomycin  IVPB 1000 milliGRAM(s) IV Intermittent every 12 hours  vancomycin  IVPB 2000 milliGRAM(s) IV Intermittent once    MEDICATIONS  (PRN):  acetaminophen  Suppository 650 milliGRAM(s) Rectal every 6 hours PRN For Temp greater than 38 C (100.4 F)          Case discussed with staff and family at the bedside

## 2018-04-29 NOTE — PROGRESS NOTE ADULT - SUBJECTIVE AND OBJECTIVE BOX
Patient is a 68y old  Female who presents with a chief complaint of sob, temperature, dec. BP (16 Apr 2018 05:53)       Pt is seen and examined  extubated but re intubated.. in vent unit        ROS:  Negative except for: sob    MEDICATIONS  (STANDING):  chlorhexidine 0.12% Liquid 15 milliLiter(s) Swish and Spit two times a day  fentaNYL   Infusion 0.1 MICROgram(s)/kG/Hr (0.616 mL/Hr) IV Continuous <Continuous>  gabapentin 100 milliGRAM(s) Oral three times a day  heparin  Injectable 5000 Unit(s) SubCutaneous every 8 hours  midodrine 10 milliGRAM(s) Oral three times a day  norepinephrine Infusion 0.02 MICROgram(s)/kG/Min (2.31 mL/Hr) IV Continuous <Continuous>  pantoprazole   Suspension 40 milliGRAM(s) Oral before breakfast  polyethylene glycol 3350 17 Gram(s) Oral daily  sodium chloride 0.9% Bolus 500 milliLiter(s) IV Bolus once  sodium chloride 0.9% Bolus 500 milliLiter(s) IV Bolus once    MEDICATIONS  (PRN):  acetaminophen  Suppository 650 milliGRAM(s) Rectal every 6 hours PRN For Temp greater than 38 C (100.4 F)  lactulose Syrup 15 Gram(s) Oral every 4 hours PRN constipation      Allergies    No Known Allergies    Intolerances        Vital Signs Last 24 Hrs  T(C): 36.8 (27 Apr 2018 15:00), Max: 38 (26 Apr 2018 20:00)  T(F): 98.2 (27 Apr 2018 15:00), Max: 100.4 (26 Apr 2018 20:00)  HR: 105 (27 Apr 2018 16:19) (90 - 119)  BP: 97/54 (27 Apr 2018 16:00) (97/54 - 140/79)  BP(mean): 70 (27 Apr 2018 16:00) (70 - 112)  RR: 27 (27 Apr 2018 16:00) (24 - 33)  SpO2: 100% (27 Apr 2018 16:19) (99% - 100%)    PHYSICAL EXAM  General: adult in NAD  HEENT: s/p extubation.  Neck: supple  CV: normal S1/S2 with no murmur rubs or gallops  Lungs: positive air movement b/l ant lungs,+ rales  Abdomen: soft non-tender ,distended,  Ext: no clubbing cyanosis   Skin: no rashes and no petechiae  Neuro: alert and oriented X 4, no focal deficits  LABS:                          7.9    28.78 )-----------( 233      ( 27 Apr 2018 04:46 )             24.5         Mean Cell Volume : 91.8 fL  Mean Cell Hemoglobin : 29.6 pg  Mean Cell Hemoglobin Concentration : 32.2 g/dL  Auto Neutrophil # : 25.95 K/uL  Auto Lymphocyte # : 0.59 K/uL  Auto Monocyte # : 1.71 K/uL  Auto Eosinophil # : 0.13 K/uL  Auto Basophil # : 0.04 K/uL  Auto Neutrophil % : 90.1 %  Auto Lymphocyte % : 2.1 %  Auto Monocyte % : 5.9 %  Auto Eosinophil % : 0.5 %  Auto Basophil % : 0.1 %    Serial CBC's  04-27 @ 04:46  Hct-24.5 / Hgb-7.9 / Plat-233 / RBC-2.67 / WBC-28.78          Serial CBC's  04-26 @ 05:03  Hct-24.6 / Hgb-7.8 / Plat-233 / RBC-2.63 / WBC-34.46          Serial CBC's  04-25 @ 04:20  Hct-24.6 / Hgb-8.1 / Plat-234 / RBC-2.70 / WBC-43.66          Serial CBC's  04-24 @ 05:01  Hct-26.7 / Hgb-8.7 / Plat-237 / RBC-2.93 / WBC-24.64            04-27    140  |  104  |  51<H>  ----------------------------<  118<H>  4.8   |  26  |  1.2    Ca    8.0<L>      27 Apr 2018 04:46            Platelet Count - Automated: 233 K/uL (04-27-18 @ 04:46)  Hemoglobin: 7.9 g/dL (04-27-18 @ 04:46)  WBC Count: 28.78 K/uL (04-27-18 @ 04:46)  Hematocrit: 24.5 % (04-27-18 @ 04:46)  Platelet Count - Automated: 233 K/uL (04-26-18 @ 05:03)  Hematocrit: 24.6 % (04-26-18 @ 05:03)  Hemoglobin: 7.8 g/dL (04-26-18 @ 05:03)  WBC Count: 34.46 K/uL (04-26-18 @ 05:03)  Hematocrit: 24.6 % (04-25-18 @ 04:20)  Platelet Count - Automated: 234 K/uL (04-25-18 @ 04:20)  Hemoglobin: 8.1 g/dL (04-25-18 @ 04:20)  WBC Count: 43.66 K/uL (04-25-18 @ 04:20)  Platelet Count - Automated: 237 K/uL (04-24-18 @ 05:01)  Hemoglobin: 8.7 g/dL (04-24-18 @ 05:01)  WBC Count: 24.64 K/uL (04-24-18 @ 05:01)  Hematocrit: 26.7 % (04-24-18 @ 05:01)  Hemoglobin: 6.8 g/dL (04-23-18 @ 04:50)  WBC Count: 18.10 K/uL (04-23-18 @ 04:50)  Platelet Count - Automated: 244 K/uL (04-23-18 @ 04:50)  Hematocrit: 21.9 % (04-23-18 @ 04:50)  Hemoglobin: 7.1 g/dL (04-22-18 @ 04:56)  WBC Count: 9.83 K/uL (04-22-18 @ 04:56)  Platelet Count - Automated: 245 K/uL (04-22-18 @ 04:56)  Hematocrit: 21.3 % (04-22-18 @ 04:56)  WBC Count: 9.01 K/uL (04-21-18 @ 04:57)  Platelet Count - Automated: 254 K/uL (04-21-18 @ 04:57)  Hematocrit: 21.6 % (04-21-18 @ 04:57)  Hemoglobin: 7.1 g/dL (04-21-18 @ 04:57)  Platelet Count - Automated: 289 K/uL (04-20-18 @ 05:59)  Hemoglobin: 7.6 g/dL (04-20-18 @ 05:59)  WBC Count: 11.83 K/uL (04-20-18 @ 05:59)  Hematocrit: 22.9 % (04-20-18 @ 05:59)  WBC Count: 13.13 K/uL (04-20-18 @ 02:42)  Hematocrit: 23.3 % (04-20-18 @ 02:42)  Platelet Count - Automated: 303 K/uL (04-20-18 @ 02:42)  Hemoglobin: 7.6 g/dL (04-20-18 @ 02:42)  Hematocrit: 24.3 % (04-19-18 @ 05:27)  Hemoglobin: 8.0 g/dL (04-19-18 @ 05:27)  WBC Count: 18.38 K/uL (04-19-18 @ 05:27)  Platelet Count - Automated: 285 K/uL (04-19-18 @ 05:27)  Hemoglobin: 8.1 g/dL (04-18-18 @ 04:30)  WBC Count: 15.06 K/uL (04-18-18 @ 04:30)  Hematocrit: 24.5 % (04-18-18 @ 04:30)  Platelet Count - Automated: 297 K/uL (04-18-18 @ 04:30)

## 2018-04-30 NOTE — PROGRESS NOTE ADULT - SUBJECTIVE AND OBJECTIVE BOX
68yFemale with diagnosis: PNEUMONIA;SEVERE SEPSIS;PLEURAL EFFUSION      Patient seen, weekend events noted. Patient unfortunately re-intubated yesterday. Currently sedated.     No family present.      T(C): , Max: 38.2 (00:09)  T(F): 98.8  HR: 105 (69 - 118)  BP: 92/55 (71/46 - 98/54)  RR: 18 (17 - 28)  SpO2: 98% (98% - 100%)              LABS:                                                                                     04-30    142  |  105  |  63<HH>  ----------------------------<  28<LL>  4.5   |  20  |  2.0<H>    Ca    8.1<L>      30 Apr 2018 05:04  Mg     1.6     04-30    TPro  4.8<L>  /  Alb  1.8<L>  /  TBili  0.4  /  DBili  x   /  AST  86<H>  /  ALT  13  /  AlkPhos  191<H>  04-30                                                      MEDICATIONS  (STANDING):  gabapentin 100 milliGRAM(s) Oral three times a day  heparin  Injectable 5000 Unit(s) SubCutaneous every 8 hours  magnesium sulfate  IVPB 2 Gram(s) IV Intermittent once  midodrine 10 milliGRAM(s) Oral three times a day  pantoprazole   Suspension 40 milliGRAM(s) Oral before breakfast  piperacillin/tazobactam IVPB. 3.375 Gram(s) IV Intermittent every 6 hours  sodium chloride 0.9% Bolus 500 milliLiter(s) IV Bolus once  sodium chloride 0.9% Bolus 500 milliLiter(s) IV Bolus once  vancomycin  IVPB 1000 milliGRAM(s) IV Intermittent daily    MEDICATIONS  (PRN):  acetaminophen  Suppository 650 milliGRAM(s) Rectal every 6 hours PRN For Temp greater than 38 C (100.4 F)  morphine  - Injectable 2 milliGRAM(s) IV Push every 4 hours PRN dyspnea and pain

## 2018-04-30 NOTE — PROGRESS NOTE ADULT - ASSESSMENT
68yFemale being evaluated for goals of care.       It is unclear at this time what patient's family is discussing regarding goals of care. Palliative Care services introduced at length last week to daughter and patient's sister.   Patient previously stated wishes for ?DNR/DNI (although not documented), now remains full code.      Recommendations:  Suggest multidisciplinary family meeting with patient's family.  Ongoing weaning trials as tolerated, ?trach if patient cannot be extubated.      Prognosis is poor overall

## 2018-04-30 NOTE — PROGRESS NOTE ADULT - ASSESSMENT
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  lost to f/u after that.  admitted with cough ,completed  abx for PNA.    1- respiratory - acute resp failure,s/p chest tube.s/p extubation as per pt's wish  ,extubated and reintubated... PTX     2 Bilateral pleural effusions.  R > L.  RO parapneumonic effusions VS malignant effusion   s/p chest tube,    3 -Anemia likely multifactorial  transfuse to keep hb >7  due to chronic and acute illness     4- leukocytosis, reactive  on abx,f/u with ID    5-Colon cancer with peritoneal mets ,  Bx proven ,5 months back,did not get any Rx as she refused. lost to f/u , .  now CT scan showing liver and pancreatic mass with carcinometosis,ascites,likley due to advance metastatic dz      overall prognosis is very poor.

## 2018-04-30 NOTE — PROGRESS NOTE ADULT - ASSESSMENT
>AHRF   >HCAP  >Pulmonary edema   > Bilateral pleural effusions.  R > L s/p right pigtail catheter placement  > MAGDY  > Left Iatrogenic Pneumothorax SP left chest tube   >Colon cancer metastatic stage IV not on treatment    PLAN:    CNS: Fentanyl PRN for analgesia    HEENT: Oral care    PULMONARY:  HOB @ 45 degrees. Keep chest tubes to suction. Wean off O2    CARDIOVASCULAR: I=O     GI: GI prophylaxis. OG tube feeds    RENAL:  Follow up lytes.  Correct as needed.     INFECTIOUS DISEASE: Follow up cultures.  Antibiotic course. Vanco trough. Panculture    HEMATOLOGICAL:  DVT prophylaxis. transfuse to keep Hb >7    ENDOCRINE:  Follow up FS.  Insulin protocol if needed.    Family meeting to decide on goals of care. >AHRF   >HCAP  >Pulmonary edema   > Bilateral pleural effusions.  R > L s/p right pigtail catheter placement  > MAGDY  > Left Iatrogenic Pneumothorax SP left chest tube   >Colon cancer metastatic stage IV not on treatment    PLAN:    CNS: Fentanyl PRN for analgesia    HEENT: Oral care    PULMONARY:  HOB @ 45 degrees. Keep chest tubes to suction. Wean off O2    CARDIOVASCULAR: I=O   speak with familu re dnr status    GI: GI prophylaxis. OG tube feeds    RENAL:  Follow up lytes.  Correct as needed.     INFECTIOUS DISEASE: Follow up cultures.  Antibiotic course. Vanco trough. Panculture    HEMATOLOGICAL:  DVT prophylaxis. transfuse to keep Hb >7    ENDOCRINE:  Follow up FS.  Insulin protocol if needed.    Family meeting to decide on goals of care.

## 2018-04-30 NOTE — PROGRESS NOTE ADULT - SUBJECTIVE AND OBJECTIVE BOX
Patient is a 68y old  Female who presents with a chief complaint of sob, temperature, dec. BP (16 Apr 2018 05:53)      HPI:  68 Y F [facility] temporary resident for rehab with pmh of anxiety/depression, h/o colon Ca s/p R hemicolectomy not on current treatment, COPD not on home O2 and htn was brought to the ED to rule out pneumonia. As per the patient she has been having productive cough for 2 days, associated with vomiting and diarrhea. She was sent in because a chest xray done at the NH showed r sided effusions. In the ED Pt was hemodynamically stable but desatting down to 89% on nasal canula. She was placed on bipap and is tolerating it well. Her cxr showed bilateral pneumonia and she was given cefepime, azithro and vanco. The patient denies abdominal pain or chest pain. She says her belly is generally distended and her LE edema has improved from before. She has no fevers, sob, chills or soar throat. Patient course was complicated with ICU admission requiring mechanical ventilation followed by iatrogenic pneumothorax  from TLC insertion s/p small bore chest tube placement. Patient was planned for comfort care and liberation however after discussion with family primary team decided to re-intubate.       HOSPITAL DAY NO: 14d    Overnight events   No significant events.      Patient is a 68y old  Female who presents with a chief complaint of sob, temperature, dec. BP (16 Apr 2018 05:53)  PNEUMONIA;SEVERE SEPSIS;PLEURAL EFFUSION  PNEUMONIA  ^SOB  No h/o HF  Yes  No pertinent family history in first degree relatives  Handoff  MEWS Score  Depression, unspecified depression type  Anxiety  Malignant neoplasm of colon, unspecified part of colon  HTN (hypertension)  COPD (chronic obstructive pulmonary disease)  Pneumonia  Pneumothorax, iatrogenic  Pneumothorax on left  SOB  Pleural effusion  Severe sepsis      Vital Signs Last 24 Hrs  T(C): 38.1 (30 Apr 2018 06:34), Max: 39.4 (29 Apr 2018 08:15)  T(F): 100.6 (30 Apr 2018 06:34), Max: 102.9 (29 Apr 2018 08:15)  HR: 117 (30 Apr 2018 00:09) (115 - 129)  BP: 98/54 (30 Apr 2018 00:09) (71/46 - 98/54)  BP(mean): 54 (29 Apr 2018 16:06) (54 - 63)  RR: 28 (30 Apr 2018 00:09) (17 - 28)  SpO2: 98% (30 Apr 2018 00:05) (98% - 100%)    use of pressors:     use of sedation:     Vent dependent: Yes    Mode: AC/ CMV (Assist Control/ Continuous Mandatory Ventilation)  RR (machine): 12  TV (machine): 400  FiO2: 45  PEEP: 5  ITime: 1  MAP: 16  PIP: 35    ABG - ( 29 Apr 2018 08:19 )  pH, Arterial: 7.41  pH, Blood: x     /  pCO2: 36    /  pO2: 267   / HCO3: 23    / Base Excess: -1.2  /  SaO2: 101         Weaning time:     Significant exam findings:     Gen :   MS:  CHEST: B/L breath sounds   ABDOMEN: soft   HEART:S1/S2 regular  SKIN:   Extremities    No of BMs:   OUT: 6.03 mL/kg/d        Nutrition:   via         LABS:                          6.4    28.73 )-----------( 228      ( 30 Apr 2018 05:04 )             20.2                                                     MEDICATIONS  (STANDING):  gabapentin 100 milliGRAM(s) Oral three times a day  heparin  Injectable 5000 Unit(s) SubCutaneous every 8 hours  midodrine 10 milliGRAM(s) Oral three times a day  pantoprazole   Suspension 40 milliGRAM(s) Oral before breakfast  piperacillin/tazobactam IVPB. 4.5 Gram(s) IV Intermittent every 6 hours  sodium chloride 0.9% Bolus 500 milliLiter(s) IV Bolus once  sodium chloride 0.9% Bolus 500 milliLiter(s) IV Bolus once  vancomycin  IVPB 1000 milliGRAM(s) IV Intermittent every 12 hours    MEDICATIONS  (PRN):  acetaminophen  Suppository 650 milliGRAM(s) Rectal every 6 hours PRN For Temp greater than 38 C (100.4 F)          Radiology   < from: Xray Chest 1 View- PORTABLE-Routine (04.29.18 @ 09:50) >  Impression:      Bilateral opacities unchanged. No pleural effusion or pneumothorax    < end of copied text > Patient is a 68y old  Female who presents with a chief complaint of sob, temperature, dec. BP (16 Apr 2018 05:53)      HPI:  68 Y F [facility] temporary resident for rehab with pmh of anxiety/depression, h/o colon Ca s/p R hemicolectomy not on current treatment, COPD not on home O2 and htn was brought to the ED to rule out pneumonia. As per the patient she has been having productive cough for 2 days, associated with vomiting and diarrhea. She was sent in because a chest xray done at the NH showed r sided effusions. In the ED Pt was hemodynamically stable but desatting down to 89% on nasal canula. She was placed on bipap and is tolerating it well. Her cxr showed bilateral pneumonia and she was given cefepime, azithro and vanco. The patient denies abdominal pain or chest pain. She says her belly is generally distended and her LE edema has improved from before. She has no fevers, sob, chills or soar throat. Patient course was complicated with ICU admission requiring mechanical ventilation followed by iatrogenic pneumothorax  from TLC insertion s/p small bore chest tube placement. Patient was planned for comfort care and liberation however after discussion with family primary team decided to re-intubate.       HOSPITAL DAY NO: 14d    Overnight events   No significant events.      Patient is a 68y old  Female who presents with a chief complaint of sob, temperature, dec. BP (16 Apr 2018 05:53)  PNEUMONIA;SEVERE SEPSIS;PLEURAL EFFUSION  PNEUMONIA  ^SOB  No h/o HF  Yes  No pertinent family history in first degree relatives  Handoff  MEWS Score  Depression, unspecified depression type  Anxiety  Malignant neoplasm of colon, unspecified part of colon  HTN (hypertension)  COPD (chronic obstructive pulmonary disease)  Pneumonia  Pneumothorax, iatrogenic  Pneumothorax on left  SOB  Pleural effusion  Severe sepsis      Vital Signs Last 24 Hrs  T(C): 38.1 (30 Apr 2018 06:34), Max: 39.4 (29 Apr 2018 08:15)  T(F): 100.6 (30 Apr 2018 06:34), Max: 102.9 (29 Apr 2018 08:15)  HR: 117 (30 Apr 2018 00:09) (115 - 129)  BP: 98/54 (30 Apr 2018 00:09) (71/46 - 98/54)  BP(mean): 54 (29 Apr 2018 16:06) (54 - 63)  RR: 28 (30 Apr 2018 00:09) (17 - 28)  SpO2: 98% (30 Apr 2018 00:05) (98% - 100%)    use of pressors:     use of sedation:     Vent dependent: Yes    Mode: AC/ CMV (Assist Control/ Continuous Mandatory Ventilation)  RR (machine): 12  TV (machine): 400  FiO2: 45  PEEP: 5  ITime: 1  MAP: 16  PIP: 35    ABG - ( 29 Apr 2018 08:19 )  pH, Arterial: 7.41  pH, Blood: x     /  pCO2: 36    /  pO2: 267   / HCO3: 23    / Base Excess: -1.2  /  SaO2: 101         Weaning time:     Significant exam findings:     Gen :   MS:very weak  CHEST: B/L breath sounds   ABDOMEN: soft   HEART:S1/S2 regular  SKIN:   Extremities    No of BMs:   OUT: 6.03 mL/kg/d        Nutrition:   via         LABS:                          6.4    28.73 )-----------( 228      ( 30 Apr 2018 05:04 )             20.2                                                     MEDICATIONS  (STANDING):  gabapentin 100 milliGRAM(s) Oral three times a day  heparin  Injectable 5000 Unit(s) SubCutaneous every 8 hours  midodrine 10 milliGRAM(s) Oral three times a day  pantoprazole   Suspension 40 milliGRAM(s) Oral before breakfast  piperacillin/tazobactam IVPB. 4.5 Gram(s) IV Intermittent every 6 hours  sodium chloride 0.9% Bolus 500 milliLiter(s) IV Bolus once  sodium chloride 0.9% Bolus 500 milliLiter(s) IV Bolus once  vancomycin  IVPB 1000 milliGRAM(s) IV Intermittent every 12 hours    MEDICATIONS  (PRN):  acetaminophen  Suppository 650 milliGRAM(s) Rectal every 6 hours PRN For Temp greater than 38 C (100.4 F)          Radiology   < from: Xray Chest 1 View- PORTABLE-Routine (04.29.18 @ 09:50) >  Impression:      Bilateral opacities unchanged. No pleural effusion or pneumothorax    < end of copied text >

## 2018-04-30 NOTE — PROGRESS NOTE ADULT - SUBJECTIVE AND OBJECTIVE BOX
Patient is a 68y old  Female who presents with a chief complaint of sob, temperature, dec. BP (16 Apr 2018 05:53)       Pt is seen and examined  extubated but re intubated.. in vent unit        ROS:  Negative except for: sob    MEDICATIONS  (STANDING):  chlorhexidine 0.12% Liquid 15 milliLiter(s) Swish and Spit two times a day  fentaNYL   Infusion 0.1 MICROgram(s)/kG/Hr (0.616 mL/Hr) IV Continuous <Continuous>  gabapentin 100 milliGRAM(s) Oral three times a day  heparin  Injectable 5000 Unit(s) SubCutaneous every 8 hours  midodrine 10 milliGRAM(s) Oral three times a day  norepinephrine Infusion 0.02 MICROgram(s)/kG/Min (2.31 mL/Hr) IV Continuous <Continuous>  pantoprazole   Suspension 40 milliGRAM(s) Oral before breakfast  polyethylene glycol 3350 17 Gram(s) Oral daily  sodium chloride 0.9% Bolus 500 milliLiter(s) IV Bolus once  sodium chloride 0.9% Bolus 500 milliLiter(s) IV Bolus once    MEDICATIONS  (PRN):  acetaminophen  Suppository 650 milliGRAM(s) Rectal every 6 hours PRN For Temp greater than 38 C (100.4 F)  lactulose Syrup 15 Gram(s) Oral every 4 hours PRN constipation      Allergies    No Known Allergies    Intolerances        Vital Signs Last 24 Hrs  VSS   Fi02 45%    PHYSICAL EXAM  General: adult in NAD, chest tube on bed side   HEENT: intubated   Neck: supple  CV: normal S1/S2 with no murmur rubs or gallops  Lungs: positive air movement b/l ant lungs,+ rales  Abdomen: soft non-tender ,distended,  Ext: no clubbing cyanosis   Skin: no rashes and no petechiae  Neuro: alert and oriented X 4, no focal deficits  LABS:                          7.9    28.78 )-----------( 233      ( 27 Apr 2018 04:46 )             24.5         Mean Cell Volume : 91.8 fL  Mean Cell Hemoglobin : 29.6 pg  Mean Cell Hemoglobin Concentration : 32.2 g/dL  Auto Neutrophil # : 25.95 K/uL  Auto Lymphocyte # : 0.59 K/uL  Auto Monocyte # : 1.71 K/uL  Auto Eosinophil # : 0.13 K/uL  Auto Basophil # : 0.04 K/uL  Auto Neutrophil % : 90.1 %  Auto Lymphocyte % : 2.1 %  Auto Monocyte % : 5.9 %  Auto Eosinophil % : 0.5 %  Auto Basophil % : 0.1 %    Serial CBC's  04-27 @ 04:46  Hct-24.5 / Hgb-7.9 / Plat-233 / RBC-2.67 / WBC-28.78          Serial CBC's  04-26 @ 05:03  Hct-24.6 / Hgb-7.8 / Plat-233 / RBC-2.63 / WBC-34.46          Serial CBC's  04-25 @ 04:20  Hct-24.6 / Hgb-8.1 / Plat-234 / RBC-2.70 / WBC-43.66          Serial CBC's  04-24 @ 05:01  Hct-26.7 / Hgb-8.7 / Plat-237 / RBC-2.93 / WBC-24.64            04-27    140  |  104  |  51<H>  ----------------------------<  118<H>  4.8   |  26  |  1.2    Ca    8.0<L>      27 Apr 2018 04:46            Platelet Count - Automated: 233 K/uL (04-27-18 @ 04:46)  Hemoglobin: 7.9 g/dL (04-27-18 @ 04:46)  WBC Count: 28.78 K/uL (04-27-18 @ 04:46)  Hematocrit: 24.5 % (04-27-18 @ 04:46)  Platelet Count - Automated: 233 K/uL (04-26-18 @ 05:03)  Hematocrit: 24.6 % (04-26-18 @ 05:03)  Hemoglobin: 7.8 g/dL (04-26-18 @ 05:03)  WBC Count: 34.46 K/uL (04-26-18 @ 05:03)  Hematocrit: 24.6 % (04-25-18 @ 04:20)  Platelet Count - Automated: 234 K/uL (04-25-18 @ 04:20)  Hemoglobin: 8.1 g/dL (04-25-18 @ 04:20)  WBC Count: 43.66 K/uL (04-25-18 @ 04:20)  Platelet Count - Automated: 237 K/uL (04-24-18 @ 05:01)  Hemoglobin: 8.7 g/dL (04-24-18 @ 05:01)  WBC Count: 24.64 K/uL (04-24-18 @ 05:01)  Hematocrit: 26.7 % (04-24-18 @ 05:01)  Hemoglobin: 6.8 g/dL (04-23-18 @ 04:50)  WBC Count: 18.10 K/uL (04-23-18 @ 04:50)  Platelet Count - Automated: 244 K/uL (04-23-18 @ 04:50)  Hematocrit: 21.9 % (04-23-18 @ 04:50)  Hemoglobin: 7.1 g/dL (04-22-18 @ 04:56)  WBC Count: 9.83 K/uL (04-22-18 @ 04:56)  Platelet Count - Automated: 245 K/uL (04-22-18 @ 04:56)  Hematocrit: 21.3 % (04-22-18 @ 04:56)  WBC Count: 9.01 K/uL (04-21-18 @ 04:57)  Platelet Count - Automated: 254 K/uL (04-21-18 @ 04:57)  Hematocrit: 21.6 % (04-21-18 @ 04:57)  Hemoglobin: 7.1 g/dL (04-21-18 @ 04:57)  Platelet Count - Automated: 289 K/uL (04-20-18 @ 05:59)  Hemoglobin: 7.6 g/dL (04-20-18 @ 05:59)  WBC Count: 11.83 K/uL (04-20-18 @ 05:59)  Hematocrit: 22.9 % (04-20-18 @ 05:59)  WBC Count: 13.13 K/uL (04-20-18 @ 02:42)  Hematocrit: 23.3 % (04-20-18 @ 02:42)  Platelet Count - Automated: 303 K/uL (04-20-18 @ 02:42)  Hemoglobin: 7.6 g/dL (04-20-18 @ 02:42)  Hematocrit: 24.3 % (04-19-18 @ 05:27)  Hemoglobin: 8.0 g/dL (04-19-18 @ 05:27)  WBC Count: 18.38 K/uL (04-19-18 @ 05:27)  Platelet Count - Automated: 285 K/uL (04-19-18 @ 05:27)  Hemoglobin: 8.1 g/dL (04-18-18 @ 04:30)  WBC Count: 15.06 K/uL (04-18-18 @ 04:30)  Hematocrit: 24.5 % (04-18-18 @ 04:30)  Platelet Count - Automated: 297 K/uL (04-18-18 @ 04:30)

## 2018-05-01 NOTE — PROGRESS NOTE ADULT - ASSESSMENT
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  lost to f/u after that.  admitted with cough ,completed  abx for PNA.    1- respiratory - acute resp failure,s/p chest tube.s/p extubation as per pt's wish  however she's reintubated... PTX     2 CHF and Bilateral pleural effusions.  R > L.  RO parapneumonic effusions VS malignant effusion   s/p chest tube,    3 -Anemia likely multifactorial  transfuse to keep hb >7  due to chronic and acute illness     4- leukocytosis, reactive  on abx,f/u with ID    5 renal insuff.. creast 2.1    6-Colon cancer with peritoneal mets ,  Bx proven ,5 months back,did not get any Rx as she refused. lost to f/u , .  now CT scan showing liver and pancreatic mass with carcinometosis,ascites,likley due to advance metastatic dz      overall prognosis is very poor.

## 2018-05-01 NOTE — PROGRESS NOTE ADULT - ASSESSMENT
>AHRF   >HCAP  >Pulmonary edema   > Bilateral pleural effusions.  R > L s/p right pigtail catheter placement  > MAGDY  > Left Iatrogenic Pneumothorax SP left chest tube   >Colon cancer metastatic stage IV not on treatment    PLAN:    CNS: Fentanyl PRN for analgesia    HEENT: Oral care    PULMONARY:  HOB @ 45 degrees. Keep chest tubes to suction. Wean off O2    CARDIOVASCULAR: I=O   speak with familu re dnr status    GI: GI prophylaxis. OG tube feeds    RENAL:  Follow up lytes.  Correct as needed.     INFECTIOUS DISEASE: Follow up cultures.  Antibiotic course. Vanco trough. Panculture    HEMATOLOGICAL:  DVT prophylaxis. transfuse to keep Hb >7    ENDOCRINE:  Follow up FS.  Insulin protocol if needed.    Family meeting to decide on goals of care.

## 2018-05-01 NOTE — PROGRESS NOTE ADULT - SUBJECTIVE AND OBJECTIVE BOX
Patient is a 68y old  Female who presents with a chief complaint of sob, temperature, dec. BP (16 Apr 2018 05:53)       Pt is seen and examined  extubated but re intubated.. in vent unit  pt awake and responsive  tries to speak         ROS:  Negative except for: sob    MEDICATIONS  (STANDING):  chlorhexidine 0.12% Liquid 15 milliLiter(s) Swish and Spit two times a day  fentaNYL   Infusion 0.1 MICROgram(s)/kG/Hr (0.616 mL/Hr) IV Continuous <Continuous>  gabapentin 100 milliGRAM(s) Oral three times a day  heparin  Injectable 5000 Unit(s) SubCutaneous every 8 hours  midodrine 10 milliGRAM(s) Oral three times a day  norepinephrine Infusion 0.02 MICROgram(s)/kG/Min (2.31 mL/Hr) IV Continuous <Continuous>  pantoprazole   Suspension 40 milliGRAM(s) Oral before breakfast  polyethylene glycol 3350 17 Gram(s) Oral daily  sodium chloride 0.9% Bolus 500 milliLiter(s) IV Bolus once  sodium chloride 0.9% Bolus 500 milliLiter(s) IV Bolus once    MEDICATIONS  (PRN):  acetaminophen  Suppository 650 milliGRAM(s) Rectal every 6 hours PRN For Temp greater than 38 C (100.4 F)  lactulose Syrup 15 Gram(s) Oral every 4 hours PRN constipation            Vital Signs Last 24 Hrs  VSS   Fi02 45%    PHYSICAL EXAM  General: adult in NAD, chest tube on bed side   HEENT: intubated   Neck: supple  CV: normal S1/S2 with no murmur rubs or gallops  Lungs: positive air movement b/l ant lungs,+ rales  Abdomen: soft non-tender ,distended,  Ext: no clubbing cyanosis   Skin: no rashes and no petechiae  Neuro: alert and oriented X 4, no focal deficits  LABS:                         8.0  27 )-----------( 201                    creat elev 2.1

## 2018-05-01 NOTE — PROGRESS NOTE ADULT - SUBJECTIVE AND OBJECTIVE BOX
Patient is a 68y old  Female who presents with a chief complaint of sob, temperature, dec. BP (16 Apr 2018 05:53)    PNEUMONIA;SEVERE SEPSIS;PLEURAL EFFUSION  PNEUMONIA  ^SOB  No h/o HF  Yes  No pertinent family history in first degree relatives  Handoff  MEWS Score  Depression, unspecified depression type  Anxiety  Malignant neoplasm of colon, unspecified part of colon  HTN (hypertension)  COPD (chronic obstructive pulmonary disease)  Pneumonia  Pneumothorax, iatrogenic  Pneumothorax on left  SOB  Pleural effusion  Severe sepsis  PNEUMONIA;SEVERE SEPSIS;PLEURAL EFFUSION [Active]  PNEUMONIA [Inactive]  Yes [Inactive]  Depression, unspecified depression type [Active]  Anxiety [Active]  Malignant neoplasm of colon, unspecified part of colon [Active]  HTN (hypertension) [Active]  COPD (chronic obstructive pulmonary disease) [Active]  Pneumonia [Active]  Pneumothorax, iatrogenic [Active]  Pneumothorax on left [Active]  Pleural effusion [Active]  Severe sepsis [Active]          HOSPITAL DAY NO:       Vital Signs Last 24 Hrs  T(C): 35.8 (01 May 2018 07:52), Max: 37.7 (30 Apr 2018 15:41)  T(F): 96.5 (01 May 2018 07:52), Max: 99.8 (30 Apr 2018 15:41)  HR: 88 (01 May 2018 07:52) (83 - 99)  BP: 85/54 (01 May 2018 07:52) (80/52 - 86/58)  BP(mean): 64 (01 May 2018 07:52) (63 - 64)  RR: 28 (01 May 2018 07:52) (24 - 28)  SpO2: 100% (01 May 2018 07:30) (98% - 100%)    use of pressors: Y ___  N ____    use of sedation: Y ___  N ____    Vent dependent: Y____  N ____    Mode: AC/ CMV (Assist Control/ Continuous Mandatory Ventilation)  RR (machine): 12  TV (machine): 400  FiO2: 40  PEEP: 5  ITime: 1  MAP: 14  PIP: 32                                          Weaning time:     Significant exam findings:   MS:  CHEST: B/L breath sounds   ABDOMEN: soft   HEART:S1/S2 regular  SKIN:     No of BMs:   OUT: 12.06 mL/kg/d        Nutrition:                    04-30-18 @ 07:01  -  05-01-18 @ 07:00  --------------------------------------------------------  IN:  Total IN: 0 mL          LABS:                          8.0    27.22 )-----------( 201      ( 01 May 2018 06:12 )             24.4                                               05-01    143  |  107  |  64<HH>  ----------------------------<  63<L>  3.2<L>   |  19  |  2.1<H>    Ca    7.6<L>      01 May 2018 06:12  Mg     1.6     04-30    TPro  4.8<L>  /  Alb  1.8<L>  /  TBili  0.4  /  DBili  x   /  AST  86<H>  /  ALT  13  /  AlkPhos  191<H>  04-30                                                LIVER FUNCTIONS - ( 30 Apr 2018 05:04 )  Alb: 1.8 g/dL / Pro: 4.8 g/dL / ALK PHOS: 191 U/L / ALT: 13 U/L / AST: 86 U/L / GGT: x                                                  Culture - Blood (collected 30 Apr 2018 05:04)  Source: .Blood None  Preliminary Report (01 May 2018 11:01):    No growth to date.                                                       MEDICATIONS  (STANDING):  dextrose 5% + sodium chloride 0.45%. 1000 milliLiter(s) (75 mL/Hr) IV Continuous <Continuous>  gabapentin 100 milliGRAM(s) Oral three times a day  heparin  Injectable 5000 Unit(s) SubCutaneous every 8 hours  midodrine 10 milliGRAM(s) Oral three times a day  pantoprazole   Suspension 40 milliGRAM(s) Oral before breakfast  piperacillin/tazobactam IVPB. 3.375 Gram(s) IV Intermittent every 6 hours  vancomycin  IVPB 1000 milliGRAM(s) IV Intermittent daily    MEDICATIONS  (PRN):  acetaminophen  Suppository 650 milliGRAM(s) Rectal every 6 hours PRN For Temp greater than 38 C (100.4 F)  morphine  - Injectable 2 milliGRAM(s) IV Push every 4 hours PRN dyspnea and pain          Case discussed with staff and family at the bedside

## 2018-05-01 NOTE — CHART NOTE - NSCHARTNOTEFT_GEN_A_CORE
Registered Dietitian Follow-Up    ***Scroll to the bottom for RD recommendation***    Patient Profile Reviewed                           Yes [x]   No []  Nutrition History Previously Obtained        Yes [x]  No []        PERTINENT MEDICAL INFORMATIONS:  (1) patient is new to ventilator unit. Here is a brief timeline; pt was s/p intubated in ICU, extubated and transferred to . Pt went on distress on bipap in  re-intubated 4/29 and sedated, transferred to vent unit. Family wanted DNR/DNI but no paper was completed, therefore, patient is on full code.   (2) Now, palliative will f/u with family regarding goal of care. ?trach if pt cannot be extubated.   (3) overall, poor prognosis      PERTINENT SUBJECTIVE INFORMATION:  (1) per RN, patient has  NG tube, not NJ tube.   (2) pt has 2+ edema, b/l IT stage 2 pressure ulcer. BM unknown at this time.  (3) feed has been paused 2/2  firm abdomen and large amount of bile was suctioned out (NG tube is currently for suction, not for feeding)  (4) Patient has multiple cancers. Poor prognosis.       DIET ORDER:  Osmolite 1.5 (NG tube) at 240cc q6hr (feeding has been paused)        ANTHROPOMETRICS:  - Ht.  - Wt.  bedscale today 5/1- 92.3kg. (it was documented the lowest wt (4/16) 61.6 kg vs highest wt (4/26) 74.4 kg). Weight gain likely from currently 2+ edema and acuity of illness.          PERTINENT LAB DATA:  5/1: h/h 8.0/24.4, BUN 17, BUN 63, Cr 3.0, Glucose 28 (finger stick later morning was 102), Ca 8.1, Albumin 1.8, and GFR 25  PERTINENT MEDS: heparin, abx, morphine, acetaminophen, protonix      PHYSICAL FINDINGS  - APPEARANCE:        intubated, not sedated, 2+ edema generalized per RN  - GI FUNCTION:        LBM unknow, however, currently was noted pt has firm/tard abdomen.   - TUBES:                     NGT  - ORAL/MOUTH:      NPO  - SKIN:                        stage 2 to b/l IT, b/l buttlock wounds.         NUTRITION REQUIREMENTS  WEIGHT USED:                          lowest weight/admitted weight of 61.6kg will be used for calculation   ESTIMATED ENERGY NEEDS:       CONTINUE [  ]      ADJUST [x  ]    ESTIMATED ENERGY NEEDS:         1122 kcal/day (PSE 2003b)  ESTIMATED PROTEIN NEEDS:         g/day (1.4-1.7g/kg of lowest weight) for pressure ulcer  ESTIMATED FLUID NEEDS:             per vent unit    CURRENT NUTRIENT NEEDS:    NOT MEETING. 0%          [  ] PREVIOUS NUTRITION DIAGNOSIS:   (1) inadequate protein-energy intake - remains, although pt has been extubated, pt is now re-intubated with poor prognosis and pt is not being fed due to altered GI function.         NUTRITION INTERVENTION:    [  ] ORAL        [x ] EN/TF     GOAL/EXPECTED OUTCOME:     Pt to restart feed, tolerate and meet >85% of estimated kcal/protein upon f/u due 5/4.  INDICATOR/MONITORING:       RD to monitor diet order, energy intake, body composition, nutrition focused physical findings (EN tolerance, skin)  PATIENT's INTERVENTION:        Enteral nutrition    RECS: (1) When medically feasible to resume feed, Start Osmolite 1.0 at q6hr with Beneprotein 2 pks q6hr. This regimen gives a total of 1200 kcal/ 90g protein/ 806 mL free water. Additional flushes per Vent team. Registered Dietitian Follow-Up    ***Scroll to the bottom for RD recommendation***    Patient Profile Reviewed                           Yes [x]   No []  Nutrition History Previously Obtained        Yes [x]  No []        PERTINENT MEDICAL INFORMATIONS:  (1) patient is new to ventilator unit. Here is a brief timeline; pt was s/p intubated in ICU, extubated and transferred to . Pt went on distress on bipap in  re-intubated 4/29 and sedated, transferred to vent unit. Family wanted DNR/DNI but no paper was completed, therefore, patient is on full code.   (2) Now, palliative will f/u with family regarding goal of care. ?trach if pt cannot be extubated.   (3) overall, poor prognosis      PERTINENT SUBJECTIVE INFORMATION:  (1) per RN, patient has  NG tube, not NJ tube.   (2) pt has 2+ edema, b/l IT stage 2 pressure ulcer. BM unknown at this time.  (3) feed has been paused 2/2  firm abdomen and large amount of bile was suctioned out (NG tube is currently for suction, not for feeding)  (4) Patient has multiple cancers. Poor prognosis.       DIET ORDER:  Osmolite 1.5 (NG tube) at 240cc q6hr (feeding has been paused)        ANTHROPOMETRICS:  - Ht.  - Wt.  bedscale today 5/1- 92.3kg. (it was documented the lowest wt (4/16) 61.6 kg vs highest wt (4/26) 74.4 kg). Weight gain likely from currently 2+ edema and acuity of illness.          PERTINENT LAB DATA:  5/1: h/h 8.0/24.4, BUN 17, BUN 63, Cr 3.0, Glucose 28 (finger stick later morning was 102), Ca 8.1, Albumin 1.8, and GFR 25  PERTINENT MEDS: heparin, abx, morphine, acetaminophen, protonix      PHYSICAL FINDINGS  - APPEARANCE:        intubated, not sedated, 2+ edema generalized per RN  - GI FUNCTION:        LBM unknow, however, currently was noted pt has firm/tard abdomen.   - TUBES:                     NGT  - ORAL/MOUTH:      NPO  - SKIN:                        stage 2 to b/l IT, b/l buttlock wounds.         NUTRITION REQUIREMENTS  WEIGHT USED:                          lowest weight/admitted weight of 61.6kg will be used for calculation   ESTIMATED ENERGY NEEDS:       CONTINUE [  ]      ADJUST [x  ]    ESTIMATED ENERGY NEEDS:         1122 kcal/day (PSE 2003b)  ESTIMATED PROTEIN NEEDS:         g/day (1.4-1.7g/kg of lowest weight) for pressure ulcer  ESTIMATED FLUID NEEDS:             per vent unit    CURRENT NUTRIENT NEEDS:    NOT MEETING. 0%          [  ] PREVIOUS NUTRITION DIAGNOSIS:   (1) inadequate protein-energy intake - remains, although pt has been extubated, pt is now re-intubated with poor prognosis and pt is not being fed due to altered GI function.         NUTRITION INTERVENTION:    [  ] ORAL        [x ] EN/TF     GOAL/EXPECTED OUTCOME:     Pt to restart feed, tolerate and meet >85% of estimated kcal/protein upon f/u due 5/4.  INDICATOR/MONITORING:       RD to monitor diet order, energy intake, body composition, nutrition focused physical findings (EN tolerance, skin)  PATIENT's INTERVENTION:        Enteral nutrition    RECS: (1) When medically feasible to resume feed, Start Osmolite 1.0 at 240cc q6hr with Beneprotein 2 pks q6hr. This regimen gives a total of 1200 kcal/ 90g protein/ 806 mL free water. Additional flushes per Vent team.

## 2018-05-02 NOTE — PROGRESS NOTE ADULT - ASSESSMENT
68yFemale being evaluated for consideration of comfort care.          Recommendations:  If family decides to withdraw vent support:  Hold feeding  Robinul 0.4mg IVP prior to extubation  Start Morphine gtt at 2mg/hr, Morphine 2mg IV q3h PRN resp distress  Ativan 1mg IV q4h PRN agitation    Grave prognosis  We will follow

## 2018-05-02 NOTE — PROGRESS NOTE ADULT - SUBJECTIVE AND OBJECTIVE BOX
Patient is a 68y old  Female who presents with a chief complaint of sob, temperature, dec. BP (16 Apr 2018 05:53)  PNEUMONIA;SEVERE SEPSIS;PLEURAL EFFUSION  PNEUMONIA  ^SOB  No h/o HF  Yes  No pertinent family history in first degree relatives  Handoff  MEWS Score  Depression, unspecified depression type  Anxiety  Malignant neoplasm of colon, unspecified part of colon  HTN (hypertension)  COPD (chronic obstructive pulmonary disease)  Pneumonia  Pneumothorax, iatrogenic  Pneumothorax on left  SOB  Pleural effusion  Severe sepsis    PNEUMONIA;SEVERE SEPSIS;PLEURAL EFFUSION  PNEUMONIA  ^SOB  No h/o HF  Yes  No pertinent family history in first degree relatives  Handoff  MEWS Score  Depression, unspecified depression type  Anxiety  Malignant neoplasm of colon, unspecified part of colon  HTN (hypertension)  COPD (chronic obstructive pulmonary disease)  Pneumonia  Pneumothorax, iatrogenic  Pneumothorax on left  SOB  Pleural effusion  Severe sepsis  PNEUMONIA;SEVERE SEPSIS;PLEURAL EFFUSION [Active]  PNEUMONIA [Inactive]  Yes [Inactive]  Depression, unspecified depression type [Active]  Anxiety [Active]  Malignant neoplasm of colon, unspecified part of colon [Active]  HTN (hypertension) [Active]  COPD (chronic obstructive pulmonary disease) [Active]  Pneumonia [Active]  Pneumothorax, iatrogenic [Active]  Pneumothorax on left [Active]  Pleural effusion [Active]  Severe sepsis [Active]          HOSPITAL DAY NO: 16d/4      Vital Signs Last 24 Hrs  T(C): 37.7 (02 May 2018 07:43), Max: 37.9 (01 May 2018 23:42)  T(F): 99.9 (02 May 2018 07:43), Max: 100.3 (01 May 2018 23:42)  HR: 101 (02 May 2018 07:50) (86 - 101)  BP: 110/58 (02 May 2018 07:43) (90/50 - 110/58)  BP(mean): 78 (02 May 2018 07:43) (69 - 78)  RR: 20 (02 May 2018 07:43) (20 - 27)  SpO2: 100% (02 May 2018 07:50) (99% - 100%)    use of pressors: Y ___  N _x___    use of sedation: Y ___  N ___x_    Vent dependent: Yx____  N ____    Mode: AC/ CMV (Assist Control/ Continuous Mandatory Ventilation)  RR (machine): 12  TV (machine): 400  FiO2: 40  PEEP: 5  ITime: 1  MAP: 15  PIP: 39           Mode: AC/ CMV (Assist Control/ Continuous Mandatory Ventilation)  RR (machine): 12  TV (machine): 400  FiO2: 40  PEEP: 5  ITime: 1  MAP: 15  PIP: 39                                 Weaning time: x    Significant exam findings:   MS:minimal responsive  CHEST: B/L breath sounds   ABDOMEN: soft   HEART:S1/S2 regular  SKIN:     No of BMs: 350 cc  OUT: 4.01 mL/kg/d        Nutrition:                    05-01-18 @ 07:01  -  05-02-18 @ 07:00  --------------------------------------------------------  IN:  Total IN: 0 mL          LABS:                          7.9    27.85 )-----------( 162      ( 02 May 2018 06:32 )             24.2                                               05-02    145  |  108  |  61<HH>  ----------------------------<  82  2.8<L>   |  19  |  2.5<H>    Ca    7.7<L>      02 May 2018 06:32                                                                                           Culture - Blood (collected 30 Apr 2018 05:04)  Source: .Blood None  Preliminary Report (01 May 2018 11:01):    No growth to date.                                                       MEDICATIONS  (STANDING):  dextrose 5% + sodium chloride 0.45%. 1000 milliLiter(s) (75 mL/Hr) IV Continuous <Continuous>  gabapentin 100 milliGRAM(s) Oral three times a day  heparin  Injectable 5000 Unit(s) SubCutaneous every 8 hours  midodrine 10 milliGRAM(s) Oral three times a day  pantoprazole   Suspension 40 milliGRAM(s) Oral before breakfast  piperacillin/tazobactam IVPB. 3.375 Gram(s) IV Intermittent every 6 hours  vancomycin  IVPB 1000 milliGRAM(s) IV Intermittent daily    MEDICATIONS  (PRN):  acetaminophen  Suppository 650 milliGRAM(s) Rectal every 6 hours PRN For Temp greater than 38 C (100.4 F)  morphine  - Injectable 2 milliGRAM(s) IV Push every 4 hours PRN dyspnea and pain          Case discussed with staff and family at the bedside

## 2018-05-02 NOTE — PROGRESS NOTE ADULT - NSHPATTENDINGPLANDISCUSS_GEN_ALL_CORE
Vent unit staff
ICU team
ICU staff
Vent team
Vent team
GREEN TEAM
ICU team
icu staff
ICU team
Vent unit staff

## 2018-05-02 NOTE — PROGRESS NOTE ADULT - ASSESSMENT
>AHRF   >HCAP  >Pulmonary edema   > Bilateral pleural effusions.  R > L s/p right pigtail catheter placement  > MAGDY  > Left Iatrogenic Pneumothorax SP left chest tube   >Colon cancer metastatic stage IV not on treatment    PLAN:    CNS: Fentanyl PRN for analgesia    HEENT: Oral care    PULMONARY:  HOB @ 45 degrees. Keep chest tubes to suction. Wean off O2    CARDIOVASCULAR: I=O   weaning trial with meeting tmwn with family  speak with familu re dnr status  restart feedins    GI: GI prophylaxis. OG tube feeds    RENAL:  Follow up lytes.  Correct as needed.     INFECTIOUS DISEASE: Follow up cultures.  Antibiotic course. Vanco trough. Panculture    HEMATOLOGICAL:  DVT prophylaxis. transfuse to keep Hb >7    ENDOCRINE:  Follow up FS.  Insulin protocol if needed.    Family meeting to decide on goals of care.

## 2018-05-02 NOTE — PROGRESS NOTE ADULT - PROVIDER SPECIALTY LIST ADULT
Anticoag Management
CT Surgery
Critical Care
Heme/Onc
Internal Medicine
Palliative Care
Pulmonology
Pulmonology
Critical Care
Pulmonology
Critical Care

## 2018-05-03 LAB — GRAM STN FLD: SIGNIFICANT CHANGE UP

## 2018-05-05 LAB
-  5-FLUCYTOSINE: SIGNIFICANT CHANGE UP
-  AMPHOTERICIN B: SIGNIFICANT CHANGE UP
-  ANIDULAFUNGIN: SIGNIFICANT CHANGE UP
-  CASPOFUNGIN: SIGNIFICANT CHANGE UP
-  FLUCONAZOLE: SIGNIFICANT CHANGE UP
-  ITRACONAZOLE: SIGNIFICANT CHANGE UP
-  MICAFUNGIN: SIGNIFICANT CHANGE UP
-  POSACONAZOLE: SIGNIFICANT CHANGE UP
-  VORICONAZOLE: SIGNIFICANT CHANGE UP
CULTURE RESULTS: SIGNIFICANT CHANGE UP
CULTURE RESULTS: SIGNIFICANT CHANGE UP
METHOD TYPE: SIGNIFICANT CHANGE UP
ORGANISM # SPEC MICROSCOPIC CNT: SIGNIFICANT CHANGE UP
SPECIMEN SOURCE: SIGNIFICANT CHANGE UP
SPECIMEN SOURCE: SIGNIFICANT CHANGE UP

## 2018-05-10 DIAGNOSIS — Z51.5 ENCOUNTER FOR PALLIATIVE CARE: ICD-10-CM

## 2018-05-10 DIAGNOSIS — J90 PLEURAL EFFUSION, NOT ELSEWHERE CLASSIFIED: ICD-10-CM

## 2018-05-10 DIAGNOSIS — A41.9 SEPSIS, UNSPECIFIED ORGANISM: ICD-10-CM

## 2018-05-10 DIAGNOSIS — N18.9 CHRONIC KIDNEY DISEASE, UNSPECIFIED: ICD-10-CM

## 2018-05-10 DIAGNOSIS — I12.9 HYPERTENSIVE CHRONIC KIDNEY DISEASE WITH STAGE 1 THROUGH STAGE 4 CHRONIC KIDNEY DISEASE, OR UNSPECIFIED CHRONIC KIDNEY DISEASE: ICD-10-CM

## 2018-05-10 DIAGNOSIS — R06.02 SHORTNESS OF BREATH: ICD-10-CM

## 2018-05-10 DIAGNOSIS — J96.01 ACUTE RESPIRATORY FAILURE WITH HYPOXIA: ICD-10-CM

## 2018-05-10 DIAGNOSIS — J44.0 CHRONIC OBSTRUCTIVE PULMONARY DISEASE WITH (ACUTE) LOWER RESPIRATORY INFECTION: ICD-10-CM

## 2018-05-10 DIAGNOSIS — J18.9 PNEUMONIA, UNSPECIFIED ORGANISM: ICD-10-CM

## 2018-05-10 DIAGNOSIS — R65.20 SEVERE SEPSIS WITHOUT SEPTIC SHOCK: ICD-10-CM

## 2018-05-10 DIAGNOSIS — F32.9 MAJOR DEPRESSIVE DISORDER, SINGLE EPISODE, UNSPECIFIED: ICD-10-CM

## 2018-05-10 DIAGNOSIS — S27.0XXA TRAUMATIC PNEUMOTHORAX, INITIAL ENCOUNTER: ICD-10-CM

## 2018-05-10 DIAGNOSIS — Z66 DO NOT RESUSCITATE: ICD-10-CM

## 2018-05-10 DIAGNOSIS — J95.72 ACCIDENTAL PUNCTURE AND LACERATION OF A RESPIRATORY SYSTEM ORGAN OR STRUCTURE DURING OTHER PROCEDURE: ICD-10-CM

## 2018-05-10 DIAGNOSIS — E83.42 HYPOMAGNESEMIA: ICD-10-CM

## 2018-05-10 DIAGNOSIS — Z85.038 PERSONAL HISTORY OF OTHER MALIGNANT NEOPLASM OF LARGE INTESTINE: ICD-10-CM

## 2018-05-10 DIAGNOSIS — E87.6 HYPOKALEMIA: ICD-10-CM

## 2018-05-10 DIAGNOSIS — F41.9 ANXIETY DISORDER, UNSPECIFIED: ICD-10-CM

## 2018-05-10 DIAGNOSIS — N17.9 ACUTE KIDNEY FAILURE, UNSPECIFIED: ICD-10-CM

## 2020-11-09 NOTE — PATIENT PROFILE ADULT. - SOCIAL CONCERNS
PRINCIPAL DISCHARGE DIAGNOSIS  Diagnosis: Suicide attempt  Assessment and Plan of Treatment: At the time of discharge, the patient was hemodynamically stable, was tolerating PO diet, was voiding urine and passing stool, was ambulating, and was comfortable with adequate pain control. The patient was instructed to follow up with Dr. Jaime within 1-2 weeks after discharge from the hospital. The patient/family felt comfortable with discharge. The patient was discharged to home/rehab. The patient had no other issues.      SECONDARY DISCHARGE DIAGNOSES  Diagnosis: Severe episode of recurrent major depressive disorder, without psychotic features  Assessment and Plan of Treatment: Psychiatric medications per last appointment:  - Paroxetine 40 daily  - Olanzapine 15 qhs  - Clonazepam 1mg in AM, 2mg in PM  - Zolpidem 12.5mg at bedtime   continue home regimen   - Paxil now at 20mg daily; continue to titrate down   - Remeron 15mg po qhs  - Klonopin 1mg BID  - Zyprexa 15mg qhs  - melatonin 6mg qhs  Follow up with outpt psych, Dr. Dominguez upon discharge.    
None

## 2022-12-30 NOTE — PATIENT PROFILE ADULT. - HEALTH ADVICE, FACTORS THAT PREVENT FOLLOWING, PROFILE
Elevated lactate 12/29  s/p 1L LR   Lactate cleared Elevated lactate 12/30  s/p 500cc LR   Lactate cleared none

## 2023-01-11 NOTE — PATIENT PROFILE ADULT. - AS SC BRADEN ACTIVITY
CARE COORDINATION - SCAN - PA DENIAL (01/11/2023)    
PA STARTED: TRAMADOL 50MG 1/5/2023    COVER MY MEDS KEY: [id number]    WAITING ON INSURANCE REPLY    Outcome  Approved today  PA Case: [id number], Status: Approved, Coverage Starts on: 1/5/2023 12:00:00 AM, Coverage Ends on: 7/4/2023 12:00:00 AM.  Drug  traMADol HCl 50MG tablets  Form  [employer] [employer] PA Form (2017 [employer])    PA STARTED: UBRELVY 100MG 1/5/2023    COVER MY MEDS KEY: [id number]    WAITING ON INSURANCE REPLY  
(1) bedfast

## 2023-10-04 NOTE — H&P ADULT - NSHPLANGLIMITEDENGLISH_GEN_A_CORE
Patient attended session 34 of Cardiac Rehab Phase 2. See scanned in exercise session report in the Media tab.  
No

## 2025-03-24 NOTE — AIRWAY PLACEMENT NOTE ADULT - POST AIRWAY PLACEMENT ASSESSMENT:
CXR pending/breath sounds equal/breath sounds bilateral/positive end tidal CO2 noted
positive end tidal CO2 noted/breath sounds equal/breath sounds bilateral
Statement Selected
breath sounds equal/breath sounds bilateral/positive end tidal CO2 noted/chest excursion noted

## 2025-03-27 NOTE — PATIENT PROFILE ADULT. - SPIRITUAL ADVISOR NOTIFIED, PROFILE
